# Patient Record
Sex: FEMALE | Race: WHITE | NOT HISPANIC OR LATINO | Employment: OTHER | URBAN - METROPOLITAN AREA
[De-identification: names, ages, dates, MRNs, and addresses within clinical notes are randomized per-mention and may not be internally consistent; named-entity substitution may affect disease eponyms.]

---

## 2017-03-20 ENCOUNTER — HOSPITAL ENCOUNTER (OUTPATIENT)
Facility: MEDICAL CENTER | Age: 67
End: 2017-03-20
Attending: SPECIALIST
Payer: MEDICARE

## 2017-03-20 LAB — CYTOLOGY REG CYTOL: NORMAL

## 2017-03-20 PROCEDURE — 88175 CYTOPATH C/V AUTO FLUID REDO: CPT

## 2017-05-08 ENCOUNTER — OFFICE VISIT (OUTPATIENT)
Dept: INTERNAL MEDICINE | Facility: MEDICAL CENTER | Age: 67
End: 2017-05-08
Payer: MEDICARE

## 2017-05-08 VITALS
WEIGHT: 177.8 LBS | BODY MASS INDEX: 31.5 KG/M2 | OXYGEN SATURATION: 94 % | DIASTOLIC BLOOD PRESSURE: 64 MMHG | SYSTOLIC BLOOD PRESSURE: 98 MMHG | TEMPERATURE: 98.1 F | HEART RATE: 88 BPM | HEIGHT: 63 IN

## 2017-05-08 DIAGNOSIS — E78.5 DYSLIPIDEMIA: ICD-10-CM

## 2017-05-08 DIAGNOSIS — R73.01 IMPAIRED FASTING GLUCOSE: ICD-10-CM

## 2017-05-08 DIAGNOSIS — E55.9 VITAMIN D DEFICIENCY: ICD-10-CM

## 2017-05-08 DIAGNOSIS — G45.8 OTHER SPECIFIED TRANSIENT CEREBRAL ISCHEMIAS: ICD-10-CM

## 2017-05-08 DIAGNOSIS — C54.9 MALIGNANT NEOPLASM OF CORPUS UTERI, EXCEPT ISTHMUS (HCC): ICD-10-CM

## 2017-05-08 DIAGNOSIS — Z79.01 CHRONIC ANTICOAGULATION: ICD-10-CM

## 2017-05-08 DIAGNOSIS — R91.1 PULMONARY NODULE: ICD-10-CM

## 2017-05-08 DIAGNOSIS — H35.30 MACULAR DEGENERATION: ICD-10-CM

## 2017-05-08 DIAGNOSIS — I10 ESSENTIAL HYPERTENSION: ICD-10-CM

## 2017-05-08 DIAGNOSIS — E66.9 OBESITY (BMI 30-39.9): ICD-10-CM

## 2017-05-08 DIAGNOSIS — Z80.9 FAMILY HISTORY OF CANCER: ICD-10-CM

## 2017-05-08 DIAGNOSIS — Z00.00 HEALTH CARE MAINTENANCE: ICD-10-CM

## 2017-05-08 DIAGNOSIS — I48.91 ATRIAL FIBRILLATION, UNSPECIFIED TYPE (HCC): ICD-10-CM

## 2017-05-08 PROCEDURE — 99214 OFFICE O/P EST MOD 30 MIN: CPT | Performed by: INTERNAL MEDICINE

## 2017-05-08 ASSESSMENT — PATIENT HEALTH QUESTIONNAIRE - PHQ9: CLINICAL INTERPRETATION OF PHQ2 SCORE: 0

## 2017-05-08 NOTE — ASSESSMENT & PLAN NOTE
No pre-syncope  The patient denies any symptoms referable to her elevated blood pressure. Specifically denies chest pain, palpitations, dyspnea, orthopnea, PND or peripheral edema. Current medication regimen is as listed below. Patient denies any side effects of medication.

## 2017-05-08 NOTE — MR AVS SNAPSHOT
"        Cristela Reyna Loges   2017 2:40 PM   Office Visit   MRN: [id number]    Department:  [facility]   Dept Phone:  [phone]    Description:  Female : 1950   Provider:  Nancy Ayoub M.D.           Reason for Visit     Annual Exam           Allergies as of 2017     Allergen Noted Reactions    Fentanyl 2012   Anaphylaxis    Versed 2012   Shortness of Breath, Vomiting    Bee 2012       And  spider--- tongue swelling    Codeine 2008       Increased heart rate    Eggs 2012   Vomiting    Flu Virus Vaccine 2016       Lidocaine Hcl 2008       \"severe drop in BP\"    Other Drug 2014       Novocaine hypotension ALL \"MELISA\"    Penicillins 2008       Increased heart rate    Soap 2008   Rash    Laundry Tide soap, Dove and Ivory soap    Tape 2008   Rash    Paper tape OK    Xylocaine [Lidocaine Hcl, Local Anesth.] 2010       All \"MELISA\" hypotension      You were diagnosed with     Obesity (BMI 30-39.9)   [789700]       Macular degeneration   [463662]       Malignant neoplasm of corpus uteri, except isthmus (CMS-HCC)   [182.0.ICD-9-CM]       Atrial fibrillation, unspecified type (CMS-HCC)   [1410147]       Essential hypertension   [9786212]       Dyslipidemia   [295821]       Chronic anticoagulation   [519080]       Impaired fasting glucose   [790.21.ICD-9-CM]       Family history of cancer   [507098]       Pulmonary nodule   [554820]       Other specified transient cerebral ischemias   [435.8.ICD-9-CM]       Health care maintenance   [937379]       Vitamin D deficiency   [4102275]         Vital Signs     Blood Pressure Pulse Temperature Height Weight Body Mass Index    98/64 mmHg 88 36.7 °C (98.1 °F) 1.607 m (5' 3.25\") 80.65 kg (177 lb 12.8 oz) 31.23 kg/m2    Oxygen Saturation Smoking Status                94% Never Smoker           Basic Information     Date Of Birth Sex Race Ethnicity Preferred Language    1950 Female " White Non- English      Problem List              ICD-10-CM Priority Class Noted - Resolved    Atrial fibrillation (CMS-HCC) I48.91 High  12/6/2012 - Present    Chronic anticoagulation Z79.01 High  12/6/2012 - Present    Essential hypertension I10 Medium  12/6/2012 - Present    Dyslipidemia E78.5 Medium  12/6/2012 - Present    TIA (transient ischemic attack) G45.9 Low  12/6/2012 - Present    Malignant neoplasm of corpus uteri, except isthmus (CMS-HCC) C54.9   8/28/2014 - Present    Macular degeneration H35.30   5/10/2016 - Present    Impaired fasting glucose R73.01   5/10/2016 - Present    Vitamin D deficiency E55.9   5/10/2016 - Present    Obesity (BMI 30-39.9) E66.9   5/10/2016 - Present    Hernia of abdominal wall K43.9   5/10/2016 - Present    Family history of cancer Z80.9   5/10/2016 - Present    Health care maintenance Z00.00   5/10/2016 - Present    Pulmonary nodule R91.1   11/9/2016 - Present      Health Maintenance        Date Due Completion Dates    IMM DTaP/Tdap/Td Vaccine (1 - Tdap) 9/24/1967 ---    IMM ZOSTER VACCINE 9/24/2008 ---    MAMMOGRAM 5/26/2017 5/26/2016, 11/11/2014, 12/17/2012, 12/2/2011, 11/29/2010, 11/13/2009, 11/13/2009, 11/10/2008, 11/10/2008, 11/9/2007, 11/9/2007, 11/2/2006, 10/17/2005    PAP SMEAR 3/20/2020 3/20/2017, 12/19/2013, 12/18/2012, 12/2/2011, 11/29/2010    BONE DENSITY 5/26/2021 5/26/2016, 12/4/2009    COLONOSCOPY 3/4/2025 3/4/2015, 3/4/2015 (Done)    Override on 3/4/2015: Done            Current Immunizations     13-VALENT PCV PREVNAR 11/9/2016    Pneumococcal polysaccharide vaccine (PPSV-23) 5/19/2015      Below and/or attached are the medications your provider expects you to take. Review all of your home medications and newly ordered medications with your provider and/or pharmacist. Follow medication instructions as directed by your provider and/or pharmacist. Please keep your medication list with you and share with your provider. Update the information when  medications are discontinued, doses are changed, or new medications (including over-the-counter products) are added; and carry medication information at all times in the event of emergency situations     Allergies:  FENTANYL - Anaphylaxis     VERSED - Shortness of Breath,Vomiting     BEE - (reactions not documented)     CODEINE - (reactions not documented)     EGGS - Vomiting     FLU VIRUS VACCINE - (reactions not documented)     LIDOCAINE HCL - (reactions not documented)     OTHER DRUG - (reactions not documented)     PENICILLINS - (reactions not documented)     SOAP - Rash     TAPE - Rash     XYLOCAINE - (reactions not documented)               Medications  Valid as of: May 08, 2017 -  4:01 PM    Generic Name Brand Name Tablet Size Instructions for use    Calcium (Tab) Calcium 600 MG Take 1 Tab by mouth every morning.          Cholecalciferol   Take 1,400 Units by mouth every day.        Dabigatran Etexilate Mesylate (Cap) PRADAXA 150 MG TAKE ONE CAPSULE BY MOUTH TWICE DAILY        Lisinopril (Tab) PRINIVIL 5 MG Take 1 Tab by mouth every day.        Lovastatin (Tab) MEVACOR 40 MG TAKE ONE TABLET BY MOUTH ONE TIME DAILY        Metoprolol Tartrate (Tab) LOPRESSOR 25 MG TAKE ONE TABLET BY MOUTH TWICE DAILY        Multiple Vitamin (Tab) THERAGRAN  Take 1 Tab by mouth every morning.          Multiple Vitamins-Minerals   Take  by mouth.        Polyethyl Glycol-Propyl Glycol   Place 1 Drop in both eyes every bedtime. Indications: **OTC**        Polyethylene Glycol 400   by Ophthalmic route every day.        .                 Medicines prescribed today were sent to:     [id number] - [place], [place] - [address] [place] [address] [place] [address]    Phone: [phone] Fax: [phone]    Open 24 Hours?: No      Medication refill instructions:       If your prescription bottle indicates you have medication refills left, it is not necessary to call your provider’s office. Please contact your pharmacy and they  will refill your medication.    If your prescription bottle indicates you do not have any refills left, you may request refills at any time through one of the following ways: The online [employer] system (except Urgent Care), by calling your provider’s office, or by asking your pharmacy to contact your provider’s office with a refill request. Medication refills are processed only during regular business hours and may not be available until the next business day. Your provider may request additional information or to have a follow-up visit with you prior to refilling your medication.   *Please Note: Medication refills are assigned a new Rx number when refilled electronically. Your pharmacy may indicate that no refills were authorized even though a new prescription for the same medication is available at the pharmacy. Please request the medicine by name with the pharmacy before contacting your provider for a refill.        Your To Do List     Future Labs/Procedures Complete By Expires    CBC WITH DIFFERENTIAL  As directed 5/9/2018    COMP METABOLIC PANEL  As directed 5/9/2018    CT-CHEST (THORAX) WITH  As directed 5/8/2018    HEMOGLOBIN A1C  As directed 5/9/2018    HEP C VIRUS ANTIBODY  As directed 5/9/2018    LIPID PROFILE  As directed 5/9/2018    MICROALBUMIN CREAT RATIO URINE  As directed 5/9/2018      Instructions    Get labs and CT in June    Get tetanus with whooping  cough at pharmacy          [employer] Access Code: Activation code not generated  Current [employer]Plan Status: Active

## 2017-05-08 NOTE — ASSESSMENT & PLAN NOTE
Patient denies polyuria, polydipsia. Is taking meds as prescribed. Is being attentive to diet. And making efforts to exercise regularly. Denies any new numbness symptoms in the feet, and does routine foot inspections. Is up-to-date routine vision screening. Patient denies chest pain, palpitations, shortness of breath, excessive fatigue.

## 2017-05-09 NOTE — PROGRESS NOTES
Established Patient    Cristela Alan is a 66 y.o. female who presents today with the following:    CC: Here for routine visit. No new complaints.    HPI:    Obesity (BMI 30-39.9)  Weight down-     Macular degeneration  Just had visit- vision better, mac degen- stabilizing    Malignant neoplasm of corpus uteri, except isthmus (CMS-HCC)  Doing well- has mammo order    Atrial fibrillation  stable- reg cards f/u.    Essential hypertension  No pre-syncope  The patient denies any symptoms referable to her elevated blood pressure. Specifically denies chest pain, palpitations, dyspnea, orthopnea, PND or peripheral edema. Current medication regimen is as listed below. Patient denies any side effects of medication.        Dyslipidemia  Patient denies any signs/symptoms of cardiovascular disease. Denies chest pain/pressure; denies numbness/weakness or difficulty with speech/swallowing or sudden change in vision.       Chronic anticoagulation  No bleeding/bruising    Impaired fasting glucose  Patient denies polyuria, polydipsia. Is taking meds as prescribed. Is being attentive to diet. And making efforts to exercise regularly. Denies any new numbness symptoms in the feet, and does routine foot inspections. Is up-to-date routine vision screening. Patient denies chest pain, palpitations, shortness of breath, excessive fatigue.      Family history of cancer  No BM changes or blood.     Pulmonary nodule  No URI symptoms, no cough, no hemoptysis    TIA (transient ischemic attack)  Patient denies any new neurological symptoms no numbness, weakness, vision, swallow, or speech problems. And is taking meds as prescribed.      Health care maintenance  Sees eye doc/dentist  Active, eating pretty well- not too much attn to diet.     Vitamin D deficiency  On replcmt.         ROS: As per HPI. Additional pertinent symptoms as noted below.    ROS:  Constitutional ROS: No unexpected change in weight, No weakness, No fatigue  Eye ROS:  No recent significant change in vision  Pulmonary ROS: No shortness of breath, No recent change in breathing  Cardiovascular ROS: No chest pain, No edema, No palpitations, No syncope  Gastrointestinal ROS: No abdominal pain, No nausea, vomiting, diarrhea, or constipation  Psychiatric ROS: No depression, No anxiety    Patient Active Problem List    Diagnosis Date Noted   • Atrial fibrillation (CMS-HCC) 12/06/2012     Priority: High   • Chronic anticoagulation 12/06/2012     Priority: High   • Essential hypertension 12/06/2012     Priority: Medium   • Dyslipidemia 12/06/2012     Priority: Medium   • TIA (transient ischemic attack) 12/06/2012     Priority: Low   • Pulmonary nodule 11/09/2016   • Macular degeneration 05/10/2016   • Impaired fasting glucose 05/10/2016   • Vitamin D deficiency 05/10/2016   • Obesity (BMI 30-39.9) 05/10/2016   • Hernia of abdominal wall 05/10/2016   • Family history of cancer 05/10/2016   • Health care maintenance 05/10/2016   • Malignant neoplasm of corpus uteri, except isthmus (CMS-HCC) 08/28/2014       Social History   Substance Use Topics   • Smoking status: Never Smoker    • Smokeless tobacco: Never Used   • Alcohol Use: Yes      Comment:  1 every 3-4 months       Current Outpatient Prescriptions   Medication Sig Dispense Refill   • Multiple Vitamins-Minerals (PRESERVISION AREDS PO) Take  by mouth.     • Cholecalciferol (VITAMIN D HIGH POTENCY PO) Take 1,400 Units by mouth every day.     • PRADAXA 150 MG Cap capsule TAKE ONE CAPSULE BY MOUTH TWICE DAILY 180 Cap 3   • metoprolol (LOPRESSOR) 25 MG Tab TAKE ONE TABLET BY MOUTH TWICE DAILY 180 Tab 3   • lovastatin (MEVACOR) 40 MG tablet TAKE ONE TABLET BY MOUTH ONE TIME DAILY 90 Tab 1   • lisinopril (PRINIVIL) 5 MG Tab Take 1 Tab by mouth every day. 90 Tab 3   • Polyethylene Glycol 400 (BLINK TEARS OP) by Ophthalmic route every day.     • Calcium 600 MG TABS Take 1 Tab by mouth every morning.       • multivitamin (THERAGRAN) TABS Take 1  "Tab by mouth every morning.       • Polyethyl Glycol-Propyl Glycol (SYSTANE OP) Place 1 Drop in both eyes every bedtime. Indications: **OTC**       No current facility-administered medications for this visit.       BP 98/64 mmHg  Pulse 88  Temp(Src) 36.7 °C (98.1 °F)  Ht 1.607 m (5' 3.25\")  Wt 80.65 kg (177 lb 12.8 oz)  BMI 31.23 kg/m2  SpO2 94%    Physical Exam  General: Well developed, well nourished female, in no distress.  Eyes: Conjuntiva without any obvious injection or erythema.   Ears- canals and TMs clear  Mouth- mucous membranes moist, no erythema  Cardiovascular: Heart is regular with no murmurs, no bruits  Lungs: Clear to auscultation bilaterally. No wheezes, rhonci or crackles heard. Respiratory effort is normal.  Abd: Soft, non-tender  Ext: No edema  Other: Patient in and out of chair, on and off exam table without problem or assistance.          Assessment and Plan      1. Obesity (BMI 30-39.9)  Patient working on getting her weight down. She is not particularly motivated to make dietary changes but she will resume walking. Discussed decreased carbs and increase fruits and veggies  - Patient identified as having weight management issue.  Appropriate orders and counseling given.    2. Macular degeneration  Since things have stabilized. Regular follow-up with ophthalmology    3. Malignant neoplasm of corpus uteri, except isthmus (CMS-HCC)  Visits with Dr. Wylie every 4 months now.  - CBC WITH DIFFERENTIAL; Future    4. Atrial fibrillation, unspecified type (CMS-HCC)  Sinus rhythm today. Regular follow-up with cardiology on a yearly basis    5. Essential hypertension  Near/at goal No change in treatment. Discussed diet, exercise and salt restriction.    Encourage patient to drink lots of fluids. Her blood pressure is low here today. She denies any symptoms associated with that. She can discuss with her cardiologist at the next routine visit whether it may be worth backing off on the metoprolol if her " blood pressures remains a low    - COMP METABOLIC PANEL; Future    6. Dyslipidemia  Lipids are in reasonable range. The patient to continue paying attention to diet and exercise. Patient aware to go to emergency department or call 911 if any signs of cardiovascular disease- chest pain or pressure, sudden numbness or weakness in an arm or leg, sudden loss of ability to talk or swallow.      - COMP METABOLIC PANEL; Future  - LIPID PROFILE; Future    7. Chronic anticoagulation      8. Impaired fasting glucose  The patient's last hemoglobin A1c:  Lab Results   Component Value Date/Time    GLYCOHEMOGLOBIN 6.2* 09/14/2016 08:37 AM    GLYCOHEMOGLOBIN 6.1* 03/14/2016 08:18 AM    GLYCOHEMOGLOBIN 6.2* 06/01/2015 08:14 AM    GLYCOHEMOGLOBIN 6.1* 12/08/2014 08:03 AM       We discussed the need for carbohydrate restriction, regular exercise and weight control.       - HEMOGLOBIN A1C; Future  - MICROALBUMIN CREAT RATIO URINE; Future    9. Family history of cancer        10. Pulmonary nodule  Repeat CT in June. She does get a lot of secondhand smoke from her  who now smokes outside  - CT-CHEST (THORAX) WITH; Future    11. Other specified transient cerebral ischemias  Patient has no signs of recent neurovascular event. Risk factors are controlled.        12. Health care maintenance  Screening up-to-date with the exception of hep C. Also recommend tdap At the pharmacy  - HEP C VIRUS ANTIBODY; Future    13. Vitamin D deficiency  On replacement      Return in about 6 months (around 11/8/2017).      Signed by: Nancy Ayoub M.D.    This note was created using voice recognition software. There may be unintended errors in spelling, grammar or content.

## 2017-06-12 ENCOUNTER — HOSPITAL ENCOUNTER (OUTPATIENT)
Dept: LAB | Facility: MEDICAL CENTER | Age: 67
End: 2017-06-12
Attending: INTERNAL MEDICINE
Payer: MEDICARE

## 2017-06-12 DIAGNOSIS — Z00.00 HEALTH CARE MAINTENANCE: ICD-10-CM

## 2017-06-12 DIAGNOSIS — I10 ESSENTIAL HYPERTENSION: ICD-10-CM

## 2017-06-12 DIAGNOSIS — E78.5 DYSLIPIDEMIA: ICD-10-CM

## 2017-06-12 DIAGNOSIS — R73.01 IMPAIRED FASTING GLUCOSE: ICD-10-CM

## 2017-06-12 DIAGNOSIS — C54.9 MALIGNANT NEOPLASM OF CORPUS UTERI, EXCEPT ISTHMUS (HCC): ICD-10-CM

## 2017-06-12 LAB
ALBUMIN SERPL BCP-MCNC: 3.5 G/DL (ref 3.2–4.9)
ALBUMIN/GLOB SERPL: 1.2 G/DL
ALP SERPL-CCNC: 74 U/L (ref 30–99)
ALT SERPL-CCNC: 12 U/L (ref 2–50)
ANION GAP SERPL CALC-SCNC: 4 MMOL/L (ref 0–11.9)
AST SERPL-CCNC: 13 U/L (ref 12–45)
BASOPHILS # BLD AUTO: 1.2 % (ref 0–1.8)
BASOPHILS # BLD: 0.05 K/UL (ref 0–0.12)
BILIRUB SERPL-MCNC: 0.6 MG/DL (ref 0.1–1.5)
BUN SERPL-MCNC: 16 MG/DL (ref 8–22)
CALCIUM SERPL-MCNC: 9.1 MG/DL (ref 8.5–10.5)
CHLORIDE SERPL-SCNC: 105 MMOL/L (ref 96–112)
CHOLEST SERPL-MCNC: 169 MG/DL (ref 100–199)
CO2 SERPL-SCNC: 29 MMOL/L (ref 20–33)
CREAT SERPL-MCNC: 0.87 MG/DL (ref 0.5–1.4)
CREAT UR-MCNC: 67.1 MG/DL
EOSINOPHIL # BLD AUTO: 0.08 K/UL (ref 0–0.51)
EOSINOPHIL NFR BLD: 1.9 % (ref 0–6.9)
ERYTHROCYTE [DISTWIDTH] IN BLOOD BY AUTOMATED COUNT: 44.8 FL (ref 35.9–50)
EST. AVERAGE GLUCOSE BLD GHB EST-MCNC: 131 MG/DL
GFR SERPL CREATININE-BSD FRML MDRD: >60 ML/MIN/1.73 M 2
GLOBULIN SER CALC-MCNC: 2.9 G/DL (ref 1.9–3.5)
GLUCOSE SERPL-MCNC: 125 MG/DL (ref 65–99)
HBA1C MFR BLD: 6.2 % (ref 0–5.6)
HCT VFR BLD AUTO: 43 % (ref 37–47)
HCV AB SER QL: NEGATIVE
HDLC SERPL-MCNC: 54 MG/DL
HGB BLD-MCNC: 13.9 G/DL (ref 12–16)
IMM GRANULOCYTES # BLD AUTO: 0.01 K/UL (ref 0–0.11)
IMM GRANULOCYTES NFR BLD AUTO: 0.2 % (ref 0–0.9)
LDLC SERPL CALC-MCNC: 88 MG/DL
LYMPHOCYTES # BLD AUTO: 1.13 K/UL (ref 1–4.8)
LYMPHOCYTES NFR BLD: 26.6 % (ref 22–41)
MCH RBC QN AUTO: 29.7 PG (ref 27–33)
MCHC RBC AUTO-ENTMCNC: 32.3 G/DL (ref 33.6–35)
MCV RBC AUTO: 91.9 FL (ref 81.4–97.8)
MICROALBUMIN UR-MCNC: <0.7 MG/DL
MICROALBUMIN/CREAT UR: NORMAL MG/G (ref 0–30)
MONOCYTES # BLD AUTO: 0.39 K/UL (ref 0–0.85)
MONOCYTES NFR BLD AUTO: 9.2 % (ref 0–13.4)
NEUTROPHILS # BLD AUTO: 2.59 K/UL (ref 2–7.15)
NEUTROPHILS NFR BLD: 60.9 % (ref 44–72)
NRBC # BLD AUTO: 0 K/UL
NRBC BLD AUTO-RTO: 0 /100 WBC
PLATELET # BLD AUTO: 294 K/UL (ref 164–446)
PMV BLD AUTO: 10.1 FL (ref 9–12.9)
POTASSIUM SERPL-SCNC: 4.3 MMOL/L (ref 3.6–5.5)
PROT SERPL-MCNC: 6.4 G/DL (ref 6–8.2)
RBC # BLD AUTO: 4.68 M/UL (ref 4.2–5.4)
SODIUM SERPL-SCNC: 138 MMOL/L (ref 135–145)
TRIGL SERPL-MCNC: 137 MG/DL (ref 0–149)
WBC # BLD AUTO: 4.3 K/UL (ref 4.8–10.8)

## 2017-06-12 PROCEDURE — 80053 COMPREHEN METABOLIC PANEL: CPT

## 2017-06-12 PROCEDURE — 80061 LIPID PANEL: CPT

## 2017-06-12 PROCEDURE — 36415 COLL VENOUS BLD VENIPUNCTURE: CPT

## 2017-06-12 PROCEDURE — 83036 HEMOGLOBIN GLYCOSYLATED A1C: CPT

## 2017-06-12 PROCEDURE — 82043 UR ALBUMIN QUANTITATIVE: CPT

## 2017-06-12 PROCEDURE — 82570 ASSAY OF URINE CREATININE: CPT

## 2017-06-12 PROCEDURE — 86803 HEPATITIS C AB TEST: CPT

## 2017-06-12 PROCEDURE — 85025 COMPLETE CBC W/AUTO DIFF WBC: CPT

## 2017-06-14 ENCOUNTER — HOSPITAL ENCOUNTER (OUTPATIENT)
Dept: RADIOLOGY | Facility: MEDICAL CENTER | Age: 67
End: 2017-06-14
Attending: INTERNAL MEDICINE
Payer: MEDICARE

## 2017-06-14 DIAGNOSIS — Z12.31 ENCOUNTER FOR MAMMOGRAM TO ESTABLISH BASELINE MAMMOGRAM: ICD-10-CM

## 2017-06-14 PROCEDURE — 77063 BREAST TOMOSYNTHESIS BI: CPT

## 2017-06-16 ENCOUNTER — HOSPITAL ENCOUNTER (OUTPATIENT)
Dept: RADIOLOGY | Facility: MEDICAL CENTER | Age: 67
End: 2017-06-16
Attending: INTERNAL MEDICINE
Payer: MEDICARE

## 2017-06-16 DIAGNOSIS — R91.1 PULMONARY NODULE: ICD-10-CM

## 2017-06-16 PROCEDURE — 71260 CT THORAX DX C+: CPT

## 2017-06-16 PROCEDURE — 700117 HCHG RX CONTRAST REV CODE 255: Performed by: INTERNAL MEDICINE

## 2017-06-16 RX ADMIN — IOHEXOL 75 ML: 350 INJECTION, SOLUTION INTRAVENOUS at 10:56

## 2017-06-22 RX ORDER — LOVASTATIN 40 MG/1
TABLET ORAL
Qty: 90 TAB | Refills: 0 | Status: SHIPPED | OUTPATIENT
Start: 2017-06-22 | End: 2017-09-28 | Stop reason: SDUPTHER

## 2017-06-22 NOTE — TELEPHONE ENCOUNTER
Last seen: 05/08/17 by Dr. Ayoub  Next appt: None     Was the patient seen in the last year in this department? Yes   Does patient have an active prescription for medications requested? No   Received Request Via: Pharmacy

## 2017-07-21 ENCOUNTER — OFFICE VISIT (OUTPATIENT)
Dept: INTERNAL MEDICINE | Facility: MEDICAL CENTER | Age: 67
End: 2017-07-21
Payer: MEDICARE

## 2017-07-21 VITALS
WEIGHT: 177.4 LBS | BODY MASS INDEX: 31.43 KG/M2 | SYSTOLIC BLOOD PRESSURE: 118 MMHG | HEART RATE: 67 BPM | TEMPERATURE: 97.5 F | HEIGHT: 63 IN | DIASTOLIC BLOOD PRESSURE: 80 MMHG | OXYGEN SATURATION: 98 %

## 2017-07-21 DIAGNOSIS — R91.1 PULMONARY NODULE: ICD-10-CM

## 2017-07-21 PROCEDURE — 99214 OFFICE O/P EST MOD 30 MIN: CPT | Mod: GC | Performed by: INTERNAL MEDICINE

## 2017-07-21 NOTE — PROGRESS NOTES
Established Patient    Cristela presents today with the following:    CC: To discuss CT scan results     HPI: Ms Alan is a 67 yo female, (PCP Dr Ayoub) PMH of HTN, DLD,  atrial fibrillation on Pradaxa, history of endometrial cancer, multiple lung nodules, presents today to discuss the results of her most recent CT scan on 2016.   There was incidental finding of lung nodule on chest x-ray ordered for follow-up of endometrial cancer around 2016. A  CT chest done on 2016 showed 3 nodules. Follow-up CT was done in 2016 and 2017. 2 new nodules found on CT.   She denies cough, shortness of breath, hemoptysis, recent change in weight, fatigue.   She has no other complaints. All her chronic medical conditions are stable, compliant with medication.   She has a family history of cancer, her sister had endometrial cancer, mother also had endometrial cancer, her grandmother  of pancreatic cancer.   She never smoked however she's exposed to secondhand smoking as her  smokes a lot.     Patient Active Problem List    Diagnosis Date Noted   • Atrial fibrillation (CMS-HCC) 2012     Priority: High   • Chronic anticoagulation 2012     Priority: High   • Essential hypertension 2012     Priority: Medium   • Dyslipidemia 2012     Priority: Medium   • TIA (transient ischemic attack) 2012     Priority: Low   • Pulmonary nodule 2016   • Macular degeneration 05/10/2016   • Impaired fasting glucose 05/10/2016   • Vitamin D deficiency 05/10/2016   • Obesity (BMI 30-39.9) 05/10/2016   • Hernia of abdominal wall 05/10/2016   • Family history of cancer 05/10/2016   • Health care maintenance 05/10/2016   • Malignant neoplasm of corpus uteri, except isthmus (CMS-HCC) 2014       Current Outpatient Prescriptions   Medication Sig Dispense Refill   • lovastatin (MEVACOR) 40 MG tablet TAKE ONE TABLET BY MOUTH ONE TIME DAILY 90 Tab 0   • Multiple  "Vitamins-Minerals (PRESERVISION AREDS PO) Take  by mouth.     • Cholecalciferol (VITAMIN D HIGH POTENCY PO) Take 1,400 Units by mouth every day.     • PRADAXA 150 MG Cap capsule TAKE ONE CAPSULE BY MOUTH TWICE DAILY 180 Cap 3   • metoprolol (LOPRESSOR) 25 MG Tab TAKE ONE TABLET BY MOUTH TWICE DAILY 180 Tab 3   • lisinopril (PRINIVIL) 5 MG Tab Take 1 Tab by mouth every day. 90 Tab 3   • Polyethylene Glycol 400 (BLINK TEARS OP) by Ophthalmic route every day.     • Calcium 600 MG TABS Take 1 Tab by mouth every morning.       • multivitamin (THERAGRAN) TABS Take 1 Tab by mouth every morning.       • Polyethyl Glycol-Propyl Glycol (SYSTANE OP) Place 1 Drop in both eyes every bedtime. Indications: **OTC**       No current facility-administered medications for this visit.       ROS: As per HPI. Additional pertinent symptoms as noted below.        /80 mmHg  Pulse 67  Temp(Src) 36.4 °C (97.5 °F)  Ht 1.607 m (5' 3.25\")  Wt 80.468 kg (177 lb 6.4 oz)  BMI 31.16 kg/m2  SpO2 98%    Physical Exam   Constitutional:  oriented to person, place, and time. No distress.   Eyes: Pupils are equal, round, and reactive to light. No scleral icterus.  Neck: Neck supple. No thyromegaly present.   Cardiovascular: Normal rate, regular rhythm and normal heart sounds.  Exam reveals no gallop and no friction rub.  No murmur heard.  Pulmonary/Chest: Breath sounds normal. Chest wall is not dull to percussion.   Musculoskeletal:   no edema.   Lymphadenopathy: no cervical adenopathy  Neurological: alert and oriented to person, place, and time.   Skin: No cyanosis. Nails show no clubbing.        Assessment and Plan    1. Pulmonary nodule  Incidental finding of lung nodules in a non smoking patient.   Prior lung nodules                                  09/2016 12/2016 06/2017  Right upper lobe     9mm                  9mm                   9.3  Lingular lobe           5mm                  5.6 mm               5.7 " mm  Left lower lobe                                   4.7 mm              3.5 mm    New findings on 06/2017   - since previous examination, ill-defined somewhat nodular infiltration within the posterior right upper lobe has developed. Within this infiltration, a 6.4 x 11.6 mm nodular infiltrate image 23 and 9.6 x 10.2 mm nodular infiltrate image 27 are demonstrated.    Will order PET CT to further evaluation, patient verbalizes understanding.   Further follow up based on PET CT findings.     Followup: with Dr Ayoub or sooner based on PET CT results      Signed by: Lisa Read M.D.

## 2017-07-21 NOTE — MR AVS SNAPSHOT
"        Cristela Reyna Loges   2017 8:00 AM   Office Visit   MRN: [id number]    Department:  [facility] Med - Internal Med   Dept Phone:  [phone]    Description:  Female : 1950   Provider:  Lisa Read M.D.           Reason for Visit     Results CT-Dr. Ayoub pt       Allergies as of 2017     Allergen Noted Reactions    Fentanyl 2012   Anaphylaxis    Versed 2012   Shortness of Breath, Vomiting    Bee 2012       And  spider--- tongue swelling    Codeine 2008       Increased heart rate    Eggs 2012   Vomiting    Flu Virus Vaccine 2016       Lidocaine Hcl 2008       \"severe drop in BP\"    Other Drug 2014       Novocaine hypotension ALL \"MELISA\"    Penicillins 2008       Increased heart rate    Soap 2008   Rash    Laundry Tide soap, Dove and Ivory soap    Tape 2008   Rash    Paper tape OK    Xylocaine [Lidocaine Hcl, Local Anesth.] 2010       All \"MELISA\" hypotension      You were diagnosed with     Pulmonary nodule   [[id number]]         Vital Signs     Blood Pressure Pulse Temperature Height Weight Body Mass Index    118/80 mmHg 67 36.4 °C (97.5 °F) 1.607 m (5' 3.25\") 80.468 kg (177 lb 6.4 oz) 31.16 kg/m2    Oxygen Saturation Smoking Status                98% Never Smoker           Basic Information     Date Of Birth Sex Race Ethnicity Preferred Language    1950 Female White Non- English      Your appointments     Dec 05, 2017  3:20 PM   Established Patient with RANDI Weathers / [facility] Med - Internal Medicine (--)    [address] [place] [address]  [address]  [facility] [id number] [phone]           You will be receiving a confirmation call a few days before your appointment from our automated call confirmation system.            2018  8:00 AM   Established Patient with RANDI Weathers / [facility] Med - Internal Medicine (--)    [address]  [address]  [facility] " [id number]   [phone]           You will be receiving a confirmation call a few days before your appointment from our automated call confirmation system.              Problem List              ICD-10-CM Priority Class Noted - Resolved    Atrial fibrillation (CMS-HCC) I48.91 High  12/6/2012 - Present    Chronic anticoagulation Z79.01 High  12/6/2012 - Present    Essential hypertension I10 Medium  12/6/2012 - Present    Dyslipidemia E78.5 Medium  12/6/2012 - Present    TIA (transient ischemic attack) G45.9 Low  12/6/2012 - Present    Malignant neoplasm of corpus uteri, except isthmus (CMS-[facility]) C54.9   8/28/2014 - Present    Macular degeneration H35.30   5/10/2016 - Present    Impaired fasting glucose R73.01   5/10/2016 - Present    Vitamin D deficiency E55.9   5/10/2016 - Present    Obesity (BMI 30-39.9) E66.9   5/10/2016 - Present    Hernia of abdominal wall K43.9   5/10/2016 - Present    Family history of cancer Z80.9   5/10/2016 - Present    Health care maintenance Z00.00   5/10/2016 - Present    Pulmonary nodule R91.1   11/9/2016 - Present      Health Maintenance        Date Due Completion Dates    IMM DTaP/Tdap/Td Vaccine (1 - Tdap) 9/24/1967 ---    IMM ZOSTER VACCINE 9/24/2008 ---    IMM INFLUENZA (1) 9/1/2017 ---    MAMMOGRAM 6/14/2018 6/14/2017, 5/26/2016, 11/11/2014, 12/17/2012, 12/2/2011, 11/29/2010, 11/13/2009, 11/13/2009, 11/10/2008, 11/10/2008, 11/9/2007, 11/9/2007, 11/2/2006, 10/17/2005    PAP SMEAR 3/20/2020 3/20/2017, 12/19/2013, 12/18/2012, 12/2/2011, 11/29/2010    BONE DENSITY 5/26/2021 5/26/2016, 12/4/2009    COLONOSCOPY 3/4/2025 3/4/2015, 3/4/2015 (Done)    Override on 3/4/2015: Done            Current Immunizations     13-VALENT PCV PREVNAR 11/9/2016    Pneumococcal polysaccharide vaccine (PPSV-23) 5/19/2015      Below and/or attached are the medications your provider expects you to take. Review all of your home medications and newly ordered medications with your provider and/or pharmacist.  Follow medication instructions as directed by your provider and/or pharmacist. Please keep your medication list with you and share with your provider. Update the information when medications are discontinued, doses are changed, or new medications (including over-the-counter products) are added; and carry medication information at all times in the event of emergency situations     Allergies:  FENTANYL - Anaphylaxis     VERSED - Shortness of Breath,Vomiting     BEE - (reactions not documented)     CODEINE - (reactions not documented)     EGGS - Vomiting     FLU VIRUS VACCINE - (reactions not documented)     LIDOCAINE HCL - (reactions not documented)     OTHER DRUG - (reactions not documented)     PENICILLINS - (reactions not documented)     SOAP - Rash     TAPE - Rash     XYLOCAINE - (reactions not documented)               Medications  Valid as of: July 21, 2017 -  8:33 AM    Generic Name Brand Name Tablet Size Instructions for use    Calcium (Tab) Calcium 600 MG Take 1 Tab by mouth every morning.          Cholecalciferol   Take 1,400 Units by mouth every day.        Dabigatran Etexilate Mesylate (Cap) PRADAXA 150 MG TAKE ONE CAPSULE BY MOUTH TWICE DAILY        Lisinopril (Tab) PRINIVIL 5 MG Take 1 Tab by mouth every day.        Lovastatin (Tab) MEVACOR 40 MG TAKE ONE TABLET BY MOUTH ONE TIME DAILY        Metoprolol Tartrate (Tab) LOPRESSOR 25 MG TAKE ONE TABLET BY MOUTH TWICE DAILY        Multiple Vitamin (Tab) THERAGRAN  Take 1 Tab by mouth every morning.          Multiple Vitamins-Minerals   Take  by mouth.        Polyethyl Glycol-Propyl Glycol   Place 1 Drop in both eyes every bedtime. Indications: **OTC**        Polyethylene Glycol 400   by Ophthalmic route every day.        .                 Medicines prescribed today were sent to:     [id number] - [place], [place] - [address] [facility] [address] [place] [place] [address]    Phone: [phone] Fax: [phone]    Open 24 Hours?: No      Medication refill  instructions:       If your prescription bottle indicates you have medication refills left, it is not necessary to call your provider’s office. Please contact your pharmacy and they will refill your medication.    If your prescription bottle indicates you do not have any refills left, you may request refills at any time through one of the following ways: The online [employer] system (except Urgent Care), by calling your provider’s office, or by asking your pharmacy to contact your provider’s office with a refill request. Medication refills are processed only during regular business hours and may not be available until the next business day. Your provider may request additional information or to have a follow-up visit with you prior to refilling your medication.   *Please Note: Medication refills are assigned a new Rx number when refilled electronically. Your pharmacy may indicate that no refills were authorized even though a new prescription for the same medication is available at the pharmacy. Please request the medicine by name with the pharmacy before contacting your provider for a refill.        Your To Do List     Future Labs/Procedures Complete By Expires    CT-PETCT-WHOLE BODY  As directed 7/21/2018      Instructions    Please get the PET scan done, will follow up with results      PET Scan  A PET scan, also called positron emission tomography, is a test that creates pictures of the inside of the body. A PET scan requires a small dose of a harmless radioactive material to be injected into a vein. When this material combines with certain substances in the body, it produces tiny particles that can be detected by a scanner and converted into pictures.   The pictures created during a PET scan can be used to study a disease. They are often used to study cancer and cancer therapy. The colors and brightness on the pictures show different levels of organ and tissue function. For example, cancer tissue appears brighter  than normal tissue on a PET scan picture.  LET YOUR HEALTH CARE PROVIDER KNOW ABOUT:   · Any allergies you have.  · All medicines you are taking, including vitamins, herbs, eye drops, creams, and over-the-counter medicines.  · Previous problems you or members of your family have had with the use of anesthetics.  · Any blood disorders you have.  · Previous surgeries you have had.  · Medical conditions you have.  · If you are afraid of cramped spaces (claustrophobic). If claustrophobia is a problem, it usually can be relieved with mild sedatives or antianxiety medicines.  · If you have trouble staying still for long periods of time.  BEFORE THE PROCEDURE   · Do not eat or drink anything after midnight on the night before the procedure or as directed by your health care provider.  · Take medicines only as directed by your health care provider.  · If you have diabetes, ask your health care provider for diet guidelines to control sugar (glucose) levels on the day of the test.  PROCEDURE   · A small amount of radioactive material will be injected into a vein. The test will begin 30-60 minutes after the injection, when the material has traveled around your body.  · You will lie on a cushioned table, and the table will be moved through the center of a machine that looks like a large donut. It will take about 30-60 minutes for the machine to produce pictures of your body. You will need to stay still during this time.  AFTER THE PROCEDURE  · You may resume your normal diet and activities.  · Drink several 8 oz glasses of water following the test to flush the radioactive material out of your body.     This information is not intended to replace advice given to you by your health care provider. Make sure you discuss any questions you have with your health care provider.     Document Released: 06/23/2004 Document Revised: 01/08/2016 Document Reviewed: 04/01/2015  [employer] Interactive Patient Education ©2016 [employer] Inc.               [employer] Access Code: Activation code not generated  Current [employer] Status: Active

## 2017-07-21 NOTE — PATIENT INSTRUCTIONS
Please get the PET scan done, will follow up with results      PET Scan  A PET scan, also called positron emission tomography, is a test that creates pictures of the inside of the body. A PET scan requires a small dose of a harmless radioactive material to be injected into a vein. When this material combines with certain substances in the body, it produces tiny particles that can be detected by a scanner and converted into pictures.   The pictures created during a PET scan can be used to study a disease. They are often used to study cancer and cancer therapy. The colors and brightness on the pictures show different levels of organ and tissue function. For example, cancer tissue appears brighter than normal tissue on a PET scan picture.  LET YOUR HEALTH CARE PROVIDER KNOW ABOUT:   · Any allergies you have.  · All medicines you are taking, including vitamins, herbs, eye drops, creams, and over-the-counter medicines.  · Previous problems you or members of your family have had with the use of anesthetics.  · Any blood disorders you have.  · Previous surgeries you have had.  · Medical conditions you have.  · If you are afraid of cramped spaces (claustrophobic). If claustrophobia is a problem, it usually can be relieved with mild sedatives or antianxiety medicines.  · If you have trouble staying still for long periods of time.  BEFORE THE PROCEDURE   · Do not eat or drink anything after midnight on the night before the procedure or as directed by your health care provider.  · Take medicines only as directed by your health care provider.  · If you have diabetes, ask your health care provider for diet guidelines to control sugar (glucose) levels on the day of the test.  PROCEDURE   · A small amount of radioactive material will be injected into a vein. The test will begin 30-60 minutes after the injection, when the material has traveled around your body.  · You will lie on a cushioned table, and the table will be moved  through the center of a machine that looks like a large donut. It will take about 30-60 minutes for the machine to produce pictures of your body. You will need to stay still during this time.  AFTER THE PROCEDURE  · You may resume your normal diet and activities.  · Drink several 8 oz glasses of water following the test to flush the radioactive material out of your body.     This information is not intended to replace advice given to you by your health care provider. Make sure you discuss any questions you have with your health care provider.     Document Released: 06/23/2004 Document Revised: 01/08/2016 Document Reviewed: 04/01/2015  [employer] Interactive Patient Education ©2016 Elsevier Inc.

## 2017-08-02 ENCOUNTER — TELEPHONE (OUTPATIENT)
Dept: INTERNAL MEDICINE | Facility: MEDICAL CENTER | Age: 67
End: 2017-08-02

## 2017-08-02 NOTE — TELEPHONE ENCOUNTER
Regarding: RE: Non-Urgent Medical Question  Contact: [phone]  ----- Message from Visionary Funio, Med Ass't sent at 8/2/2017  8:26 AM PDT -----       ----- Message sent from Odessayaima Rayo, Med Ass't to Alvaro Reyna Chandler at 8/2/2017  8:26 AM -----   Good Morning Alvaro,  Where did you have the TDap at? So I can request the record for that. I will also forward this to Dr Ayoub to answer your other questions.  Thanks,  [place]     ----- Message -----     From: CHANDLER,ALVARO REYNA     Sent: 7/31/2017  2:36 PM PDT       To: Nancy Ayoub M.D.  Subject: Non-Urgent Medical Question    For my records: I received the TDaP shot Friday July 28. I have a question.  My CT scan mentioned pneumonia. Is it saying I have or have had it? Or is this from my recent prevanar 13 shot? I have the paperwork for the pet scam. No one has called yet to set the appt. or is this something to be done in the next 3 months or so...? Thank you, Alvaro Alan

## 2017-08-02 NOTE — TELEPHONE ENCOUNTER
CT scan is saying  the spot seen could be an infection but they are not clear. That is why the PET scan was recommended. (Patient had a visit with resident and attending to review this CT Results)     MA please call and find out with the scheduling glitches for the PET scan it has been over a week at this time. The order is in the chart.    The Prevnar 13 shot should not have caused any of these issues.

## 2017-08-03 ENCOUNTER — TELEPHONE (OUTPATIENT)
Dept: INTERNAL MEDICINE | Facility: MEDICAL CENTER | Age: 67
End: 2017-08-03

## 2017-08-03 NOTE — TELEPHONE ENCOUNTER
Regarding: RE: Non-Urgent Medical Question  Contact: [phone]  ----- Message from Odessayaima Rayo, Med Ass't sent at 8/3/2017  2:57 PM PDT -----  NITISH     ----- Message sent from Odessa Girma, Med Ass't to Cristela Arteaga at 8/3/2017  2:57 PM -----   Med Archibald,  I will call [facility] they haven't put it in Web [employer]Z yet. But I will enter in your chart.  As for the Splotches. Is it just one Splotch or multiple and where are they?  Thanks,  Od[place]     ----- Message -----     From: CRISTELA ARTEAGA     Sent: 8/2/2017  3:50 PM PDT       To: Odessa Rayo, Med Ass't  Subject: RE: Non-Urgent Medical Question    I had the shot at the [facility] pharmacy on [facility] . I had 3 days of muscle pain and not I have red splotches which are warm. Been putting baking soda on to keep the itch down. Thank you. Cristela  ----- Message -----  From: Odessakiet Rayo, Med Ass't  Sent: 8/2/2017  8:26 AM PDT  To: Cristela Arteaga  Subject: RE: Non-Urgent Medical Question    Good Morning Cristela,  Where did you have the TDap at? So I can request the record for that. I will also forward this to Dr Ayoub to answer your other questions.  Thanks,  [place]     ----- Message -----     From: CRISTELA ARTEAGA     Sent: 7/31/2017  2:36 PM PDT       To: Nancy Ayoub M.D.  Subject: Non-Urgent Medical Question    For my records: I received the TDaP shot Friday July 28. I have a question.  My CT scan mentioned pneumonia. Is it saying I have or have had it? Or is this from my recent prevanar 13 shot? I have the paperwork for the pet scam. No one has called yet to set the appt. or is this something to be done in the next 3 months or so...? Thank you, Cristela Arteaga

## 2017-08-05 NOTE — TELEPHONE ENCOUNTER
Pt notified &  given phone # to schedule, was just waiting for them daniela that's what she was told by office.  Also says she sent another my chart message that I don't see on here re: Had TDAP vaccine, c/o red splotches, swelling  muscle pain x 3 days. Had at [facility] .

## 2017-09-28 RX ORDER — LOVASTATIN 40 MG/1
TABLET ORAL
Qty: 90 TAB | Refills: 0 | Status: SHIPPED | OUTPATIENT
Start: 2017-09-28 | End: 2017-12-21 | Stop reason: SDUPTHER

## 2017-09-28 NOTE — TELEPHONE ENCOUNTER
Last seen: 07/21/17 by Dr. Read  Next appt: 12/05/17 with Dr. Ayoub    Was the patient seen in the last year in this department? Yes   Does patient have an active prescription for medications requested? No   Received Request Via: Pharmacy

## 2017-10-05 ENCOUNTER — HOSPITAL ENCOUNTER (OUTPATIENT)
Dept: RADIOLOGY | Facility: MEDICAL CENTER | Age: 67
End: 2017-10-05
Attending: INTERNAL MEDICINE
Payer: MEDICARE

## 2017-10-05 DIAGNOSIS — R91.1 PULMONARY NODULE: ICD-10-CM

## 2017-10-05 PROCEDURE — A9552 F18 FDG: HCPCS

## 2017-12-05 ENCOUNTER — OFFICE VISIT (OUTPATIENT)
Dept: INTERNAL MEDICINE | Facility: MEDICAL CENTER | Age: 67
End: 2017-12-05
Payer: MEDICARE

## 2017-12-05 VITALS
SYSTOLIC BLOOD PRESSURE: 122 MMHG | BODY MASS INDEX: 31.15 KG/M2 | HEART RATE: 86 BPM | TEMPERATURE: 98.3 F | HEIGHT: 63 IN | WEIGHT: 175.8 LBS | DIASTOLIC BLOOD PRESSURE: 78 MMHG | OXYGEN SATURATION: 99 %

## 2017-12-05 DIAGNOSIS — E78.5 DYSLIPIDEMIA: ICD-10-CM

## 2017-12-05 DIAGNOSIS — I48.91 ATRIAL FIBRILLATION, UNSPECIFIED TYPE (HCC): ICD-10-CM

## 2017-12-05 DIAGNOSIS — I10 ESSENTIAL HYPERTENSION: ICD-10-CM

## 2017-12-05 DIAGNOSIS — E55.9 VITAMIN D DEFICIENCY: ICD-10-CM

## 2017-12-05 DIAGNOSIS — R73.01 IMPAIRED FASTING GLUCOSE: ICD-10-CM

## 2017-12-05 DIAGNOSIS — Z79.01 CHRONIC ANTICOAGULATION: ICD-10-CM

## 2017-12-05 DIAGNOSIS — E66.9 OBESITY (BMI 30-39.9): ICD-10-CM

## 2017-12-05 DIAGNOSIS — Z00.00 HEALTH CARE MAINTENANCE: ICD-10-CM

## 2017-12-05 DIAGNOSIS — H35.30 MACULAR DEGENERATION: ICD-10-CM

## 2017-12-05 DIAGNOSIS — R91.1 PULMONARY NODULE: ICD-10-CM

## 2017-12-05 DIAGNOSIS — G45.8 OTHER SPECIFIED TRANSIENT CEREBRAL ISCHEMIAS: ICD-10-CM

## 2017-12-05 PROCEDURE — 99214 OFFICE O/P EST MOD 30 MIN: CPT | Performed by: INTERNAL MEDICINE

## 2017-12-06 NOTE — ASSESSMENT & PLAN NOTE
Patient denies any signs/symptoms of cardiovascular disease. Denies chest pain/pressure; denies numbness/weakness or difficulty with speech/swallowing or sudden change in vision.   Reviewed PET CT- some plaque

## 2017-12-06 NOTE — PROGRESS NOTES
"          Established Patient    Cristela Alan is a 67 y.o. female who presents today with the following:    CC: Follow-up on CT, questions regarding plaque. Review chronic issues    HPI:    Health care maintenance  Sees eye doc/dentist    Atrial fibrillation  Occas notices palpitations. Has appt this month with cards. No pre-syncope.     Chronic anticoagulation  No bleeding/bruising.    Essential hypertension  The patient denies any symptoms referable to her elevated blood pressure. Specifically denies chest pain, palpitations, dyspnea, orthopnea, PND or peripheral edema. Current medication regimen is as listed below. Patient denies any side effects of medication.        Dyslipidemia  Patient denies any signs/symptoms of cardiovascular disease. Denies chest pain/pressure; denies numbness/weakness or difficulty with speech/swallowing or sudden change in vision.   Reviewed PET CT- some plaque    Macular degeneration  Reg f/u with opthal.     Impaired fasting glucose  Patient denies polyuria, polydipsia. Is taking meds as prescribed. Is being attentive to diet. And making efforts to exercise regularly. Denies any new numbness symptoms in the feet, and does routine foot inspections. Is up-to-date routine vision screening. Patient denies chest pain, palpitations, shortness of breath, excessive fatigue.      Vitamin D deficiency  On replcmt    Obesity (BMI 30-39.9)  Reg exercise.   Diet- working on changes \"my way and slowly\"  Plans to retire- will be able to focus more.     Pulmonary nodule  No cough/hemoptysis.     TIA (transient ischemic attack)  No further issues.         ROS: As per HPI. Additional pertinent symptoms as noted below. All other systems reviewed and are negative.    ROS:  Constitutional ROS: No unexpected change in weight, No weakness, No fatigue  Eye ROS: No recent significant change in vision  Pulmonary ROS: No shortness of breath, No recent change in breathing  Cardiovascular ROS: No chest pain, " No edema, No palpitations, No syncope  Gastrointestinal ROS: No abdominal pain, No nausea, vomiting, diarrhea, or constipation  Psychiatric ROS: No depression, No anxiety    Patient Active Problem List    Diagnosis Date Noted   • Atrial fibrillation (CMS-HCC) 12/06/2012     Priority: High   • Chronic anticoagulation 12/06/2012     Priority: High   • Essential hypertension 12/06/2012     Priority: Medium   • Dyslipidemia 12/06/2012     Priority: Medium   • TIA (transient ischemic attack) 12/06/2012     Priority: Low   • Pulmonary nodule 11/09/2016   • Macular degeneration 05/10/2016   • Impaired fasting glucose 05/10/2016   • Vitamin D deficiency 05/10/2016   • Obesity (BMI 30-39.9) 05/10/2016   • Hernia of abdominal wall 05/10/2016   • Family history of cancer 05/10/2016   • Health care maintenance 05/10/2016   • Malignant neoplasm of corpus uteri, except isthmus (CMS-HCC) 08/28/2014       Social History   Substance Use Topics   • Smoking status: Never Smoker   • Smokeless tobacco: Never Used   • Alcohol use Yes      Comment: 2/year        Current Outpatient Prescriptions   Medication Sig Dispense Refill   • lovastatin (MEVACOR) 40 MG tablet TAKE ONE TABLET BY MOUTH ONE TIME DAILY 90 Tab 0   • Multiple Vitamins-Minerals (PRESERVISION AREDS PO) Take  by mouth.     • Cholecalciferol (VITAMIN D HIGH POTENCY PO) Take 1,400 Units by mouth every day.     • PRADAXA 150 MG Cap capsule TAKE ONE CAPSULE BY MOUTH TWICE DAILY 180 Cap 3   • metoprolol (LOPRESSOR) 25 MG Tab TAKE ONE TABLET BY MOUTH TWICE DAILY 180 Tab 3   • lisinopril (PRINIVIL) 5 MG Tab Take 1 Tab by mouth every day. 90 Tab 3   • Polyethylene Glycol 400 (BLINK TEARS OP) by Ophthalmic route every day.     • Calcium 600 MG TABS Take 1 Tab by mouth every morning.       • multivitamin (THERAGRAN) TABS Take 1 Tab by mouth every morning.       • Polyethyl Glycol-Propyl Glycol (SYSTANE OP) Place 1 Drop in both eyes every bedtime. Indications: **OTC**       No current  "facility-administered medications for this visit.        /78   Pulse 86   Temp 36.8 °C (98.3 °F)   Ht 1.607 m (5' 3.25\")   Wt 79.7 kg (175 lb 12.8 oz)   SpO2 99%   BMI 30.90 kg/m²     Physical Exam  General: Well developed, well nourished female, in no distress., Overweight  Eyes: Conjuntiva without any obvious injection or erythema.   Ears- canals and TMs clear  Neck- no significant adenopathy  Mouth- mucous membranes moist, no erythema  Cardiovascular: Heart is regular with no murmurs, no bruits  Lungs: Clear to auscultation bilaterally. No wheezes, rhonci or crackles heard. Respiratory effort is normal.  Neuro- A & O, grossly non-focal, CN II-XII grossly in tact   Abd: Soft, non-tender  Ext: No edema  Other: Patient in and out of chair, on and off exam table without problem or assistance.          Assessment and Plan    Recent labs were reviewed.  Recent imaging was reviewed  Consultant notes since last visit here were reviewed.       1. Health care maintenance  Declines all vaccines due to previous reactions  Up-to-date with screening otherwise    2. Atrial fibrillation, unspecified type (CMS-HCC)  Rare palpitations and no presyncope. Has upcoming follow-up with Dr. Ching    3. Chronic anticoagulation  No bleeding or bruising doing fine    4. Essential hypertension  Near/at goal No change in treatment. Discussed diet, exercise and salt restriction.  BP log scanned in    5. Dyslipidemia  Lipids are in reasonable range. The patient to continue paying attention to diet and exercise. Patient aware to go to emergency department or call 911 if any signs of cardiovascular disease- chest pain or pressure, sudden numbness or weakness in an arm or leg, sudden loss of ability to talk or swallow.  Discussed the CT scan showed plaque. Patient wants to know how to get rid of that. Discussed changing to a hypodensity statin particularly with her prediabetes. She wishes to discuss it with her cardiologist at her " upcoming visit. I offered to repeat labs prior to that visit but she wishes to wait until then.      6. Macular degeneration  Regular follow-up with ophthalmology    7. Impaired fasting glucose  The patient's last hemoglobin A1c:  Lab Results   Component Value Date/Time    HBA1C 6.2 (H) 06/12/2017 08:14 AM    HBA1C 6.2 (H) 09/14/2016 08:37 AM    HBA1C 6.1 (H) 03/14/2016 08:18 AM    HBA1C 6.2 (H) 06/01/2015 08:14 AM       We discussed the need for carbohydrate restriction, regular exercise and weight control.         8. Vitamin D deficiency  On replacement    9. Obesity (BMI 30-39.9)  Slowly working on weight loss. Maximum weight was 198    10. Pulmonary nodule  Initially found about one year ago. Most recent CT scan had some question new nodules so a pet was ordered. This was unremarkable with no uptake. I think it is still prudent to repeat one more CT scan in a year from now    11. Other specified transient cerebral ischemias  Patient has no signs of recent neurovascular event. Risk factors are controlled.          Return in about 6 months (around 6/5/2018).      Signed by:       Nancy Ayoub M.D.    This note was created using voice recognition software. There may be unintended errors in spelling, grammar or content.

## 2017-12-06 NOTE — ASSESSMENT & PLAN NOTE
"Reg exercise.   Diet- working on changes \"my way and slowly\"  Plans to retire- will be able to focus more.   "

## 2017-12-06 NOTE — PATIENT INSTRUCTIONS
Discuss changes in cholesterol med to decrease plaque with Dr Avery.  Keep working on the weight loss.

## 2017-12-21 RX ORDER — LISINOPRIL 5 MG/1
TABLET ORAL
Qty: 90 TAB | Refills: 0 | Status: SHIPPED | OUTPATIENT
Start: 2017-12-21 | End: 2018-03-22 | Stop reason: SDUPTHER

## 2017-12-21 RX ORDER — LOVASTATIN 40 MG/1
TABLET ORAL
Qty: 90 TAB | Refills: 0 | Status: SHIPPED | OUTPATIENT
Start: 2017-12-21 | End: 2018-03-22 | Stop reason: SDUPTHER

## 2017-12-21 NOTE — TELEPHONE ENCOUNTER
Last seen: 12/05/17 by Dr. Ayoub  Next appt: 06/13/18 with Dr. Canas    Was the patient seen in the last year in this department? Yes   Does patient have an active prescription for medications requested? No   Received Request Via: Pharmacy

## 2017-12-29 ENCOUNTER — OFFICE VISIT (OUTPATIENT)
Dept: CARDIOLOGY | Facility: MEDICAL CENTER | Age: 67
End: 2017-12-29
Payer: MEDICARE

## 2017-12-29 VITALS
OXYGEN SATURATION: 99 % | SYSTOLIC BLOOD PRESSURE: 130 MMHG | DIASTOLIC BLOOD PRESSURE: 82 MMHG | HEART RATE: 58 BPM | BODY MASS INDEX: 31.01 KG/M2 | HEIGHT: 63 IN | WEIGHT: 175 LBS

## 2017-12-29 DIAGNOSIS — G45.8 OTHER SPECIFIED TRANSIENT CEREBRAL ISCHEMIAS: ICD-10-CM

## 2017-12-29 DIAGNOSIS — E78.5 DYSLIPIDEMIA: ICD-10-CM

## 2017-12-29 DIAGNOSIS — I10 ESSENTIAL HYPERTENSION: ICD-10-CM

## 2017-12-29 DIAGNOSIS — Z79.01 CHRONIC ANTICOAGULATION: ICD-10-CM

## 2017-12-29 DIAGNOSIS — I48.0 PAROXYSMAL ATRIAL FIBRILLATION (HCC): ICD-10-CM

## 2017-12-29 PROCEDURE — 99214 OFFICE O/P EST MOD 30 MIN: CPT | Performed by: INTERNAL MEDICINE

## 2017-12-29 ASSESSMENT — ENCOUNTER SYMPTOMS
SHORTNESS OF BREATH: 0
BRUISES/BLEEDS EASILY: 0
COUGH: 1
DEPRESSION: 0
TINGLING: 0
INSOMNIA: 0
DIZZINESS: 0
BACK PAIN: 0
TREMORS: 0
MYALGIAS: 0
SENSORY CHANGE: 0
PND: 0
WEIGHT LOSS: 0
BLURRED VISION: 0
CHILLS: 0
HEADACHES: 0
VOMITING: 0
ABDOMINAL PAIN: 0
PALPITATIONS: 1
NAUSEA: 0
SPEECH CHANGE: 0
LOSS OF CONSCIOUSNESS: 0
NECK PAIN: 0
ORTHOPNEA: 0
FEVER: 0
NERVOUS/ANXIOUS: 0

## 2017-12-29 NOTE — PROGRESS NOTES
"Subjective:   Cristela Alan is a 67 y.o. female who presents today for annual follow up for atrial fibrillation on chronic anticoagulation therapy.    Since the patient's last visit on 12/27/16, she has been doing well clinically from cardiac standpoint. She admits to occasional palpitations. She denies chest pain, shortness of breath, nausea/vomiting or diaphoresis. She is suffering with allergies. She is back to work at school district.    Past Medical History:   asdfasdfads Date   • Allergic rhinitis    • Anesthesia     hypotension   • Atrial fibrillation (CMS-HCC)    • Cancer (CMS-[facility]) 2014    endometrial   • CATARACT     bilateral lens implants   • Cholesterol blood decreased    • Dental disorder     dentures   • Dermatitis, contact    • Dyslipidemia 6/30/2016   • Endometrium cancer (CMS-[facility])    • Hyperlipidemia    • Hypertension    • Impaired glucose tolerance    • Macular degeneration    • Obesity    • Pneumonia     had PNU 35 years ago   • Prolapsed uterus 8/2014    current issue   • Stroke (CMS-[facility])     \"mini strokes\" TIA, no residual   • TIA (transient ischemic attack)    • Unspecified hemorrhagic conditions     NOSE BLEEDS/on Plavix   • Vitamin D deficiency      Past Surgical History:   Procedure Laterality Date   • ABDOMINAL HYSTERECTOMY TOTAL     • ACETABULAR OSTEOTOMY  5/12/08    Performed by ZINA HOLLIS at SURGERY [facility]   • CATARACT PHACO WITH IOL  8/5/2010    Performed by DAVE AGUILAR at SURGERY SAME DAY [facility] ORS   • CATARACT PHACO WITH IOL  8/19/2010    Performed by DAVE AGUILAR at SURGERY SAME DAY [facility] ORS   • EYE SURGERY     • HIP ARTHROSCOPY  5/12/08    Performed by ZINA HOLLIS at SURGERY [facility]   • HYSTERECTOMY ROBOTIC XI  8/28/2014    Performed by Pantera Wylie M.D. at SURGERY [facility] ORS   • LYMPH NODE DISSECTION ROBOTIC XI  8/28/2014    Performed by Pantera Wylie M.D. at SURGERY [facility] ORS   • NASAL POLYPECTOMY  1994    \"removal of " "papilloma\"   • ORTHOPEDIC OSTEOTOMY  5/12/08    Performed by ZINA HOLLIS at SURGERY [facility]     Family History   Problem Relation Age of Onset   • Hyperlipidemia Mother    • Hypertension Mother    • Heart Disease Sister    • Alcohol/Drug Sister    • Cancer Sister 66     ? breast cancer as well   • Hypertension Sister    • Hyperlipidemia Sister    • Cancer     • Hypertension     • Diabetes Maternal Grandmother      elderly   • Stroke Neg Hx      History   Smoking Status   • Never Smoker   Smokeless Tobacco   • Never Used     Allergies   Allergen Reactions   • Fentanyl Anaphylaxis   • Versed Shortness of Breath and Vomiting   • Bee      And  spider--- tongue swelling   • Codeine      Increased heart rate   • Eggs Vomiting   • Flu Virus Vaccine    • Lidocaine Hcl      \"severe drop in BP\"   • Other Drug      Novocaine hypotension ALL \"MELISA\"   • Penicillins      Increased heart rate   • Soap Rash     Laundry Tide soap, Dove and Ivory soap   • Tape Rash     Paper tape OK   • Xylocaine [Lidocaine Hcl, Local Anesth.]      All \"MELISA\" hypotension     Medications reviewed.    Outpatient Encounter Prescriptions as of 12/29/2017   Medication Sig Dispense Refill   • lisinopril (PRINIVIL) 5 MG Tab TAKE ONE TABLET BY MOUTH ONE TIME DAILY 90 Tab 0   • lovastatin (MEVACOR) 40 MG tablet TAKE ONE TABLET BY MOUTH ONE TIME DAILY 90 Tab 0   • Multiple Vitamins-Minerals (PRESERVISION AREDS PO) Take  by mouth.     • Cholecalciferol (VITAMIN D HIGH POTENCY PO) Take 1,400 Units by mouth every day.     • PRADAXA 150 MG Cap capsule TAKE ONE CAPSULE BY MOUTH TWICE DAILY 180 Cap 3   • metoprolol (LOPRESSOR) 25 MG Tab TAKE ONE TABLET BY MOUTH TWICE DAILY 180 Tab 3   • Polyethylene Glycol 400 (BLINK TEARS OP) by Ophthalmic route every day.     • Calcium 600 MG TABS Take 1 Tab by mouth every morning.       • multivitamin (THERAGRAN) TABS Take 1 Tab by mouth every morning.       • Polyethyl Glycol-Propyl Glycol (SYSTANE OP) Place 1 " "Drop in both eyes every bedtime. Indications: **OTC**       No facility-administered encounter medications on file as of 12/29/2017.      Review of Systems   Constitutional: Negative for chills, fever and weight loss.   HENT: Negative for congestion and tinnitus.    Eyes: Negative for blurred vision.   Respiratory: Positive for cough. Negative for shortness of breath.    Cardiovascular: Positive for palpitations. Negative for chest pain, orthopnea, leg swelling and PND.   Gastrointestinal: Negative for abdominal pain, nausea and vomiting.   Genitourinary: Negative for dysuria.   Musculoskeletal: Negative for back pain, joint pain, myalgias and neck pain.   Skin: Negative for rash.   Neurological: Negative for dizziness, tingling, tremors, sensory change, speech change, loss of consciousness and headaches.   Endo/Heme/Allergies: Does not bruise/bleed easily.   Psychiatric/Behavioral: Negative for depression. The patient is not nervous/anxious and does not have insomnia.         Objective:   /82   Pulse (!) 58   Ht 1.6 m (5' 3\")   Wt 79.4 kg (175 lb)   SpO2 99%   BMI 31.00 kg/m²     Physical Exam   Constitutional: She is oriented to person, place, and time. She appears well-developed and well-nourished.   HENT:   Head: Normocephalic and atraumatic.   Eyes: Conjunctivae are normal.   Neck: Normal range of motion. Neck supple.   Cardiovascular: Normal rate and regular rhythm.    Pulmonary/Chest: Effort normal and breath sounds normal.   Abdominal: Soft. Bowel sounds are normal.   Musculoskeletal: Normal range of motion. She exhibits no edema.   Neurological: She is alert and oriented to person, place, and time.   Skin: Skin is warm and dry.   Psychiatric: She has a normal mood and affect.     CARDIAC STUDIES/PROCEDURES:    ECHOCARDIOGRAM CONCLUSIONS (12/05/12)  Technically difficult study  Normal left ventricular size, thickness, systolic function, and diastolic function.  Left ventricular ejection fraction " is 55% to 60%.  Structurally normal mitral valve.  No stenosis or regurgitation seen.  Structurally normal aortic valve.  No stenosis or regurgitation seen.  Structurally normal tricuspid valve.  Trace tricuspid regurgitation.    EKG performed on (08/24/14) EKG shows normal sinus rhythm.  EKG performed on (07/30/14) EKG shows normal sinus rhythm.  EKG performed on (12/06/12) EKG shows normal sinus rhythm.  EKG performed on (12/05/12) EKG shows atrial fibrillation.    Laboratory results of (06/12/17) were reviewed. Cholesterol profile of 169/137/54/88 noted.  Laboratory results of (09/14/16) Cholesterol profile of 151/103/55/75 noted.  Laboratory results of (06/01/15) Cholesterol profile of 190/150/54/106 noted.    MPI CONCLUSIONS (06/07/06)  NORMAL MYOCARDIAL PERFUSION IMAGES WITH NO EVIDENCE OF SIGNIFICANT   ISCHEMIA OR INFARCTION.    Assessment:     Patient Active Problem List    Diagnosis Date Noted   • Atrial fibrillation 12/06/2012     Priority: High   • Chronic anticoagulation 12/06/2012     Priority: High   • Hypertension 12/06/2012     Priority: Medium   • Hyperlipidemia 12/06/2012     Priority: Medium   • TIA (transient ischemic attack) 12/06/2012     Priority: Low     Medical Decision Making:  Today's Assessment / Status / Plan:     1. Atrial fibrillation on anticoagulation therapy (Pradaxa): Sinus rhythm is maintained without evidence of atrial fibrillation episodes on beta blockade therapy. We will continue chronic anticoagulation therapy. We will perform an echocardiogram and myocardial perfusion imaging study.  2. Hypertension: Blood pressure is well controlled.  3. Hyperlipidemia (managed by Dr. Ayoub): She is doing well on statin therapy without myalgia symptoms.  4. History of trans-ischemic attack: She remains clinically stable without any new neurological symptoms. .We will perform a carotid ultrasound.  5. Health maintenance: She is under surveillance for pulmonary nodules. She underwent  surgery for uterine cancer (08/27/14).    We will follow up the patient in one year with echocardiogram, myocardial perfusion imaging study and carotid ultrasound.    CC Nancy Gordon and Pantera Whitten

## 2018-01-08 ENCOUNTER — HOSPITAL ENCOUNTER (OUTPATIENT)
Facility: MEDICAL CENTER | Age: 68
End: 2018-01-08
Attending: SPECIALIST
Payer: MEDICARE

## 2018-01-08 LAB — CYTOLOGY REG CYTOL: NORMAL

## 2018-01-08 PROCEDURE — 88175 CYTOPATH C/V AUTO FLUID REDO: CPT

## 2018-03-08 DIAGNOSIS — I10 ESSENTIAL HYPERTENSION: ICD-10-CM

## 2018-03-08 DIAGNOSIS — I48.91 ATRIAL FIBRILLATION, UNSPECIFIED TYPE (HCC): ICD-10-CM

## 2018-03-09 RX ORDER — ATENOLOL 100 MG/1
TABLET ORAL
Qty: 180 CAP | Refills: 2 | Status: SHIPPED | OUTPATIENT
Start: 2018-03-09 | End: 2018-11-29 | Stop reason: SDUPTHER

## 2018-03-23 RX ORDER — LOVASTATIN 40 MG/1
TABLET ORAL
Qty: 90 TAB | Refills: 0 | Status: SHIPPED | OUTPATIENT
Start: 2018-03-23 | End: 2018-06-23 | Stop reason: SDUPTHER

## 2018-03-23 RX ORDER — LISINOPRIL 5 MG/1
TABLET ORAL
Qty: 90 TAB | Refills: 0 | Status: SHIPPED | OUTPATIENT
Start: 2018-03-23 | End: 2018-06-23 | Stop reason: SDUPTHER

## 2018-06-18 ENCOUNTER — HOSPITAL ENCOUNTER (OUTPATIENT)
Dept: RADIOLOGY | Facility: MEDICAL CENTER | Age: 68
End: 2018-06-18
Attending: INTERNAL MEDICINE
Payer: MEDICARE

## 2018-06-18 ENCOUNTER — HOSPITAL ENCOUNTER (OUTPATIENT)
Dept: CARDIOLOGY | Facility: MEDICAL CENTER | Age: 68
End: 2018-06-18
Attending: INTERNAL MEDICINE
Payer: MEDICARE

## 2018-06-18 DIAGNOSIS — I48.0 PAROXYSMAL ATRIAL FIBRILLATION (HCC): ICD-10-CM

## 2018-06-18 DIAGNOSIS — G45.8 OTHER SPECIFIED TRANSIENT CEREBRAL ISCHEMIAS: ICD-10-CM

## 2018-06-18 LAB
LV EJECT FRACT  99904: 75
LV EJECT FRACT MOD 2C 99903: 56.59
LV EJECT FRACT MOD 4C 99902: 66.77
LV EJECT FRACT MOD BP 99901: 58.18

## 2018-06-18 PROCEDURE — A9502 TC99M TETROFOSMIN: HCPCS

## 2018-06-18 PROCEDURE — 93306 TTE W/DOPPLER COMPLETE: CPT | Mod: 26 | Performed by: INTERNAL MEDICINE

## 2018-06-18 PROCEDURE — 93306 TTE W/DOPPLER COMPLETE: CPT

## 2018-06-18 PROCEDURE — 700111 HCHG RX REV CODE 636 W/ 250 OVERRIDE (IP)

## 2018-06-18 PROCEDURE — 93880 EXTRACRANIAL BILAT STUDY: CPT

## 2018-06-18 PROCEDURE — 93880 EXTRACRANIAL BILAT STUDY: CPT | Mod: 26 | Performed by: INTERNAL MEDICINE

## 2018-06-18 RX ORDER — REGADENOSON 0.08 MG/ML
INJECTION, SOLUTION INTRAVENOUS
Status: COMPLETED
Start: 2018-06-18 | End: 2018-06-18

## 2018-06-18 RX ADMIN — REGADENOSON 0.4 MG: 0.08 INJECTION, SOLUTION INTRAVENOUS at 09:00

## 2018-06-19 ENCOUNTER — TELEPHONE (OUTPATIENT)
Dept: CARDIOLOGY | Facility: MEDICAL CENTER | Age: 68
End: 2018-06-19

## 2018-06-19 NOTE — TELEPHONE ENCOUNTER
Message   Received: Yesterday   Message Contents         ----- Message -----   From: Demetris Avery M.D.   Sent: 6/18/2018  10:27 AM   To: Sangeetha Rubio R.N.     Please call with unremarkable study, echocardiogram.     Thanks.  LINDSEY        MPI and carotid duplex also unremarkable.     Notified pt of results of all testing and answered her questions.she will call if any concerns otherwise will follow-up in December with LINDSEY

## 2018-06-25 ENCOUNTER — TELEPHONE (OUTPATIENT)
Dept: INTERNAL MEDICINE | Facility: MEDICAL CENTER | Age: 68
End: 2018-06-25

## 2018-06-25 DIAGNOSIS — E55.9 VITAMIN D DEFICIENCY: ICD-10-CM

## 2018-06-25 DIAGNOSIS — R73.01 IMPAIRED FASTING GLUCOSE: ICD-10-CM

## 2018-06-25 DIAGNOSIS — E78.5 DYSLIPIDEMIA: ICD-10-CM

## 2018-06-25 DIAGNOSIS — I10 ESSENTIAL HYPERTENSION: ICD-10-CM

## 2018-07-19 ENCOUNTER — HOSPITAL ENCOUNTER (OUTPATIENT)
Dept: LAB | Facility: MEDICAL CENTER | Age: 68
End: 2018-07-19
Attending: INTERNAL MEDICINE
Payer: MEDICARE

## 2018-07-19 ENCOUNTER — HOSPITAL ENCOUNTER (OUTPATIENT)
Dept: LAB | Facility: MEDICAL CENTER | Age: 68
End: 2018-07-19
Attending: SPECIALIST
Payer: MEDICARE

## 2018-07-19 DIAGNOSIS — R73.01 IMPAIRED FASTING GLUCOSE: ICD-10-CM

## 2018-07-19 DIAGNOSIS — E78.5 DYSLIPIDEMIA: ICD-10-CM

## 2018-07-19 DIAGNOSIS — I10 ESSENTIAL HYPERTENSION: ICD-10-CM

## 2018-07-19 DIAGNOSIS — E55.9 VITAMIN D DEFICIENCY: ICD-10-CM

## 2018-07-19 LAB
25(OH)D3 SERPL-MCNC: 48 NG/ML (ref 30–100)
ALBUMIN SERPL BCP-MCNC: 3.8 G/DL (ref 3.2–4.9)
ALBUMIN SERPL BCP-MCNC: 4.1 G/DL (ref 3.2–4.9)
ALBUMIN/GLOB SERPL: 1.4 G/DL
ALBUMIN/GLOB SERPL: 1.5 G/DL
ALP SERPL-CCNC: 67 U/L (ref 30–99)
ALP SERPL-CCNC: 73 U/L (ref 30–99)
ALT SERPL-CCNC: 13 U/L (ref 2–50)
ALT SERPL-CCNC: 13 U/L (ref 2–50)
ANION GAP SERPL CALC-SCNC: 5 MMOL/L (ref 0–11.9)
ANION GAP SERPL CALC-SCNC: 8 MMOL/L (ref 0–11.9)
AST SERPL-CCNC: 16 U/L (ref 12–45)
AST SERPL-CCNC: 17 U/L (ref 12–45)
BASOPHILS # BLD AUTO: 1.6 % (ref 0–1.8)
BASOPHILS # BLD: 0.08 K/UL (ref 0–0.12)
BILIRUB SERPL-MCNC: 0.8 MG/DL (ref 0.1–1.5)
BILIRUB SERPL-MCNC: 0.8 MG/DL (ref 0.1–1.5)
BUN SERPL-MCNC: 14 MG/DL (ref 8–22)
BUN SERPL-MCNC: 15 MG/DL (ref 8–22)
CALCIUM SERPL-MCNC: 9.3 MG/DL (ref 8.5–10.5)
CALCIUM SERPL-MCNC: 9.5 MG/DL (ref 8.5–10.5)
CHLORIDE SERPL-SCNC: 105 MMOL/L (ref 96–112)
CHLORIDE SERPL-SCNC: 107 MMOL/L (ref 96–112)
CHOLEST SERPL-MCNC: 178 MG/DL (ref 100–199)
CO2 SERPL-SCNC: 27 MMOL/L (ref 20–33)
CO2 SERPL-SCNC: 29 MMOL/L (ref 20–33)
CREAT SERPL-MCNC: 0.85 MG/DL (ref 0.5–1.4)
CREAT SERPL-MCNC: 0.93 MG/DL (ref 0.5–1.4)
EOSINOPHIL # BLD AUTO: 0.1 K/UL (ref 0–0.51)
EOSINOPHIL NFR BLD: 2.1 % (ref 0–6.9)
ERYTHROCYTE [DISTWIDTH] IN BLOOD BY AUTOMATED COUNT: 46.1 FL (ref 35.9–50)
EST. AVERAGE GLUCOSE BLD GHB EST-MCNC: 128 MG/DL
GLOBULIN SER CALC-MCNC: 2.8 G/DL (ref 1.9–3.5)
GLOBULIN SER CALC-MCNC: 2.8 G/DL (ref 1.9–3.5)
GLUCOSE SERPL-MCNC: 113 MG/DL (ref 65–99)
GLUCOSE SERPL-MCNC: 114 MG/DL (ref 65–99)
HBA1C MFR BLD: 6.1 % (ref 0–5.6)
HCT VFR BLD AUTO: 43.6 % (ref 37–47)
HDLC SERPL-MCNC: 53 MG/DL
HGB BLD-MCNC: 14.1 G/DL (ref 12–16)
IMM GRANULOCYTES # BLD AUTO: 0.02 K/UL (ref 0–0.11)
IMM GRANULOCYTES NFR BLD AUTO: 0.4 % (ref 0–0.9)
LDLC SERPL CALC-MCNC: 100 MG/DL
LYMPHOCYTES # BLD AUTO: 1.11 K/UL (ref 1–4.8)
LYMPHOCYTES NFR BLD: 22.9 % (ref 22–41)
MCH RBC QN AUTO: 30.2 PG (ref 27–33)
MCHC RBC AUTO-ENTMCNC: 32.3 G/DL (ref 33.6–35)
MCV RBC AUTO: 93.4 FL (ref 81.4–97.8)
MONOCYTES # BLD AUTO: 0.5 K/UL (ref 0–0.85)
MONOCYTES NFR BLD AUTO: 10.3 % (ref 0–13.4)
NEUTROPHILS # BLD AUTO: 3.04 K/UL (ref 2–7.15)
NEUTROPHILS NFR BLD: 62.7 % (ref 44–72)
NRBC # BLD AUTO: 0 K/UL
NRBC BLD-RTO: 0 /100 WBC
PLATELET # BLD AUTO: 284 K/UL (ref 164–446)
PMV BLD AUTO: 9.7 FL (ref 9–12.9)
POTASSIUM SERPL-SCNC: 4.5 MMOL/L (ref 3.6–5.5)
POTASSIUM SERPL-SCNC: 4.6 MMOL/L (ref 3.6–5.5)
PROT SERPL-MCNC: 6.6 G/DL (ref 6–8.2)
PROT SERPL-MCNC: 6.9 G/DL (ref 6–8.2)
RBC # BLD AUTO: 4.67 M/UL (ref 4.2–5.4)
SODIUM SERPL-SCNC: 140 MMOL/L (ref 135–145)
SODIUM SERPL-SCNC: 141 MMOL/L (ref 135–145)
TRIGL SERPL-MCNC: 126 MG/DL (ref 0–149)
WBC # BLD AUTO: 4.9 K/UL (ref 4.8–10.8)

## 2018-07-19 PROCEDURE — 80053 COMPREHEN METABOLIC PANEL: CPT

## 2018-07-19 PROCEDURE — 83036 HEMOGLOBIN GLYCOSYLATED A1C: CPT

## 2018-07-19 PROCEDURE — 85025 COMPLETE CBC W/AUTO DIFF WBC: CPT

## 2018-07-19 PROCEDURE — 80053 COMPREHEN METABOLIC PANEL: CPT | Mod: 91

## 2018-07-19 PROCEDURE — 82306 VITAMIN D 25 HYDROXY: CPT

## 2018-07-19 PROCEDURE — 36415 COLL VENOUS BLD VENIPUNCTURE: CPT

## 2018-07-19 PROCEDURE — 80061 LIPID PANEL: CPT

## 2018-07-25 ENCOUNTER — OFFICE VISIT (OUTPATIENT)
Dept: INTERNAL MEDICINE | Facility: MEDICAL CENTER | Age: 68
End: 2018-07-25
Payer: MEDICARE

## 2018-07-25 VITALS
SYSTOLIC BLOOD PRESSURE: 125 MMHG | HEART RATE: 66 BPM | WEIGHT: 175 LBS | RESPIRATION RATE: 18 BRPM | OXYGEN SATURATION: 94 % | DIASTOLIC BLOOD PRESSURE: 75 MMHG | HEIGHT: 63 IN | TEMPERATURE: 97 F | BODY MASS INDEX: 31.01 KG/M2

## 2018-07-25 DIAGNOSIS — E78.5 DYSLIPIDEMIA: ICD-10-CM

## 2018-07-25 DIAGNOSIS — I10 ESSENTIAL HYPERTENSION: ICD-10-CM

## 2018-07-25 DIAGNOSIS — I48.0 PAROXYSMAL ATRIAL FIBRILLATION (HCC): ICD-10-CM
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
## 2018-07-25 DIAGNOSIS — I83.93 VARICOSE VEINS OF BOTH LOWER EXTREMITIES: ICD-10-CM

## 2018-07-25 DIAGNOSIS — C54.9 MALIGNANT NEOPLASM OF CORPUS UTERI, EXCEPT ISTHMUS (HCC): ICD-10-CM

## 2018-07-25 PROCEDURE — 99214 OFFICE O/P EST MOD 30 MIN: CPT | Mod: GC | Performed by: INTERNAL MEDICINE

## 2018-07-25 RX ORDER — LISINOPRIL 5 MG/1
5 TABLET ORAL DAILY
Qty: 60 TAB | Refills: 0 | Status: SHIPPED | OUTPATIENT
Start: 2018-07-25 | End: 2018-08-03 | Stop reason: SDUPTHER

## 2018-07-25 RX ORDER — SULFAMETHOXAZOLE AND TRIMETHOPRIM 400; 80 MG/1; MG/1
1 TABLET ORAL 2 TIMES DAILY
Qty: 10 TAB | Refills: 0 | Status: SHIPPED | OUTPATIENT
Start: 2018-07-25 | End: 2018-12-14

## 2018-07-25 RX ORDER — LOVASTATIN 40 MG/1
40 TABLET ORAL DAILY
Qty: 60 TAB | Refills: 0 | Status: SHIPPED | OUTPATIENT
Start: 2018-07-25 | End: 2018-08-03 | Stop reason: SDUPTHER

## 2018-07-25 ASSESSMENT — PATIENT HEALTH QUESTIONNAIRE - PHQ9: CLINICAL INTERPRETATION OF PHQ2 SCORE: 0

## 2018-07-25 NOTE — PROGRESS NOTES
Established Patient    Cristela presents today with the following:    CC:   Chief Complaint   Patient presents with   • Medication Refill     lovastatin and lisinopril     HPI:   The patient is a 67-year-old female with past medical history of essential hypertension, atrial fibrillation, dyslipidemia, malignant neoplasm of uterus presented to the clinic for medication refill.  -Patient reports she has been doing well and has no recent ER visits or intercurrent illnesses.   -One week ago her cat scratched her over the right hand and has noticed swelling, redness in the area locally associated with pus on day 1 and 2 no active drainage. Denies fevers, chills, myalgias, arthralgias, swollen glands,rash.  -She has recently seen her gynecologist for follow-up of her stage I uterine cancer, PET scan recently showed no signs of cancer recurrence.  -she stated she is also following with her Cardiologist for atrial fbrillation, recently had echocardiogram which was normal. Denies any recent cardiovascular symptoms of irregular heartbeat, palpitations, fluttering in the chest, dizziness, lower extremity edema.  -Denies tobacco, and drug use. Drinks alcohol occasionally.    Patient Active Problem List    Diagnosis Date Noted   • Atrial fibrillation (HCC) 12/06/2012     Priority: High   • Chronic anticoagulation 12/06/2012     Priority: High   • Essential hypertension 12/06/2012     Priority: Medium   • Dyslipidemia 12/06/2012     Priority: Medium   • TIA (transient ischemic attack) 12/06/2012     Priority: Low   • Pulmonary nodule 11/09/2016   • Macular degeneration 05/10/2016   • Impaired fasting glucose 05/10/2016   • Vitamin D deficiency 05/10/2016   • Obesity (BMI 30-39.9) 05/10/2016   • Hernia of abdominal wall 05/10/2016   • Family history of cancer 05/10/2016   • Health care maintenance 05/10/2016   • Malignant neoplasm of corpus uteri, except isthmus (CMS-HCC) 08/28/2014       Current Outpatient Prescriptions  "  Medication Sig Dispense Refill   • lisinopril (PRINIVIL) 5 MG Tab Take 1 Tab by mouth every day. 60 Tab 0   • lovastatin (MEVACOR) 40 MG tablet Take 1 Tab by mouth every day. 60 Tab 0   • sulfamethoxazole-trimethoprim (BACTRIM) 400-80 MG Tab Take 1 Tab by mouth 2 times a day. 10 Tab 0   • metoprolol (LOPRESSOR) 25 MG Tab TAKE ONE TABLET BY MOUTH TWICE DAILY 180 Tab 2   • PRADAXA 150 MG Cap capsule TAKE ONE CAPSULE BY MOUTH TWICE DAILY 180 Cap 2   • Multiple Vitamins-Minerals (PRESERVISION AREDS PO) Take  by mouth.     • Cholecalciferol (VITAMIN D HIGH POTENCY PO) Take 1,400 Units by mouth every day.     • Polyethylene Glycol 400 (BLINK TEARS OP) by Ophthalmic route every day.     • Calcium 600 MG TABS Take 1 Tab by mouth every morning.       • multivitamin (THERAGRAN) TABS Take 1 Tab by mouth every morning.       • Polyethyl Glycol-Propyl Glycol (SYSTANE OP) Place 1 Drop in both eyes every bedtime. Indications: **OTC**       No current facility-administered medications for this visit.        ROS: As per HPI. Additional pertinent symptoms as noted below.    All others negative    /75   Pulse 66   Temp 36.1 °C (97 °F)   Resp 18   Ht 1.6 m (5' 3\")   Wt 79.4 kg (175 lb)   SpO2 94%   BMI 31.00 kg/m²     Physical Exam   Constitutional:  oriented to person, place, and time. No distress.   Eyes: Pupils are equal, round, and reactive to light. No scleral icterus.  Neck: Neck supple. No thyromegaly present.   Cardiovascular: Normal rate, regular rhythm and normal heart sounds.  Exam reveals no gallop and no friction rub.  No murmur heard.  Pulmonary/Chest: Breath sounds normal. Chest wall is not dull to percussion.   Musculoskeletal:   no edema.   Lymphadenopathy: no cervical adenopathy  Neurological: alert and oriented to person, place, and time.   Skin: No cyanosis. Nails show no clubbing.  There is a small lesion over the right hand dorsal surface at the base of the first digit that is erythematous with a " scab on the wound.  No axillary lymphadenopathy present bilaterally.    Note: I have reviewed all pertinent labs and diagnostic tests associated with this visit with specific comments listed under the assessment and plan below    Assessment and Plan    1. Paroxysmal atrial fibrillation (HCC)  -Patient has a history of paroxysmal atrial fibrillation, currently on Pradaxa and metoprolol.  -Had a recent echocardiogram which showed ejection fraction of 70% and following with Dr. Avery cardiologist.  -Stable and the patient is advised to continue the same medications.    2. Malignant neoplasm of corpus uteri, except isthmus (CMS-HCC)  -Patient has a history of malignant neoplasm of uterus stage I status post hysterectomy, lymph node removal.  -Patient had a recent PET scan which showed no signs of cancer recurrence.  -Following currently with her gynecologist oncologist Dr. Pantera Wylie    3. Essential hypertension  Vitals  Blood Pressure : 125/75  Pulse: 66  -Blood pressure is well controlled  -Continue Lisinopril 5 mg Daily.  4. Dyslipidemia  Lab Results   Component Value Date/Time    CHOLSTRLTOT 178 07/19/2018 08:56 AM     (H) 07/19/2018 08:56 AM    HDL 53 07/19/2018 08:56 AM    TRIGLYCERIDE 126 07/19/2018 08:56 AM       -Continue Lovastatin 40 mg daily  -She has 50% stenosis of the carotid artery.     5. Cat scratch  -Patient presented with complaints of Scratch one week ago over her right hand. She noticed swelling and pus that is not improving, no active drainage. Denies any fever, chills, arthralgias, myalgias, painful swollen glands.  -Stable vitals in the office today patient is afebrile, normal blood pressure and heart rate.  -On exam there is a small reddish lesion over the dorsal surface of right hand at the base of the 1st digit. No axillary lymphadenopathy.  -Patient is advised to take trimethoprim sulfamethoxazole for 5 days as the patient is allergic to penicillins.    6. Varicose veins of both lower  extremities  -Patient has bilateral varicose veins in the both extremities around the ankle area, trace edema present.  -Advised the patient to use compression stockings bilaterally and keep the feet elevated on lying down.      Followup: Return As scheduled with PCP .      Signed by: Ainsley Blackmon M.D.\

## 2018-08-03 ENCOUNTER — OFFICE VISIT (OUTPATIENT)
Dept: INTERNAL MEDICINE | Facility: MEDICAL CENTER | Age: 68
End: 2018-08-03
Payer: MEDICARE

## 2018-08-03 VITALS
HEART RATE: 70 BPM | SYSTOLIC BLOOD PRESSURE: 98 MMHG | TEMPERATURE: 97.2 F | DIASTOLIC BLOOD PRESSURE: 64 MMHG | WEIGHT: 177.4 LBS | BODY MASS INDEX: 31.43 KG/M2 | OXYGEN SATURATION: 93 % | HEIGHT: 63 IN

## 2018-08-03 DIAGNOSIS — I48.0 PAROXYSMAL ATRIAL FIBRILLATION (HCC): ICD-10-CM

## 2018-08-03 DIAGNOSIS — I10 ESSENTIAL HYPERTENSION: ICD-10-CM

## 2018-08-03 DIAGNOSIS — C54.9 MALIGNANT NEOPLASM OF CORPUS UTERI, EXCEPT ISTHMUS (HCC): ICD-10-CM

## 2018-08-03 DIAGNOSIS — E78.5 DYSLIPIDEMIA: ICD-10-CM

## 2018-08-03 DIAGNOSIS — R73.01 IMPAIRED FASTING GLUCOSE: ICD-10-CM

## 2018-08-03 DIAGNOSIS — Z79.01 CHRONIC ANTICOAGULATION: ICD-10-CM

## 2018-08-03 DIAGNOSIS — R91.1 PULMONARY NODULE: ICD-10-CM

## 2018-08-03 PROCEDURE — 99214 OFFICE O/P EST MOD 30 MIN: CPT | Performed by: INTERNAL MEDICINE

## 2018-08-03 RX ORDER — LISINOPRIL 5 MG/1
5 TABLET ORAL DAILY
Qty: 90 TAB | Refills: 4 | Status: SHIPPED | OUTPATIENT
Start: 2018-08-03 | End: 2018-09-06 | Stop reason: SDUPTHER

## 2018-08-03 RX ORDER — LOVASTATIN 40 MG/1
40 TABLET ORAL DAILY
Qty: 90 TAB | Refills: 4 | Status: SHIPPED | OUTPATIENT
Start: 2018-08-03 | End: 2018-09-06 | Stop reason: SDUPTHER

## 2018-08-03 ASSESSMENT — PAIN SCALES - GENERAL: PAINLEVEL: NO PAIN

## 2018-08-03 NOTE — PROGRESS NOTES
Established Patient    Cristela presents today with the following:    CC: Follow up on cat scratch and establish with new PCP    HPI: Patient is a very nice 67 year old lady with Hx of Atrial fibrilation on Pradaxa and metoprolol, no symptoms, uterine cancer s/p hysterectomy, DM controlled with diet, last A1C was 6.1. Hyperlipidemia on lovastatin last , Is here to day to establish care with me and to follow up on cat scratch.    I saw her with Dr. Blackmon on 7/25 with cat scratch on her Right hand , she was started on Bactrim and today it is all healed. She has some pinkish scar, no erythema , no tendernes, no warmth, No systemic symptoms.    Patient Active Problem List    Diagnosis Date Noted   • Atrial fibrillation (HCC) 12/06/2012     Priority: High   • Chronic anticoagulation 12/06/2012     Priority: High   • Essential hypertension 12/06/2012     Priority: Medium   • Dyslipidemia 12/06/2012     Priority: Medium   • TIA (transient ischemic attack) 12/06/2012     Priority: Low   • Pulmonary nodule 11/09/2016   • Macular degeneration 05/10/2016   • Impaired fasting glucose 05/10/2016   • Vitamin D deficiency 05/10/2016   • Obesity (BMI 30-39.9) 05/10/2016   • Hernia of abdominal wall 05/10/2016   • Family history of cancer 05/10/2016   • Health care maintenance 05/10/2016   • Malignant neoplasm of corpus uteri, except isthmus (CMS-HCC) 08/28/2014       Current Outpatient Prescriptions   Medication Sig Dispense Refill   • lisinopril (PRINIVIL) 5 MG Tab Take 1 Tab by mouth every day. 90 Tab 4   • lovastatin (MEVACOR) 40 MG tablet Take 1 Tab by mouth every day. 90 Tab 4   • sulfamethoxazole-trimethoprim (BACTRIM) 400-80 MG Tab Take 1 Tab by mouth 2 times a day. 10 Tab 0   • metoprolol (LOPRESSOR) 25 MG Tab TAKE ONE TABLET BY MOUTH TWICE DAILY 180 Tab 2   • PRADAXA 150 MG Cap capsule TAKE ONE CAPSULE BY MOUTH TWICE DAILY 180 Cap 2   • Multiple Vitamins-Minerals (PRESERVISION AREDS PO) Take  by mouth.     •  "Cholecalciferol (VITAMIN D HIGH POTENCY PO) Take 1,400 Units by mouth every day.     • Polyethylene Glycol 400 (BLINK TEARS OP) by Ophthalmic route every day.     • Calcium 600 MG TABS Take 1 Tab by mouth every morning.       • multivitamin (THERAGRAN) TABS Take 1 Tab by mouth every morning.       • Polyethyl Glycol-Propyl Glycol (SYSTANE OP) Place 1 Drop in both eyes every bedtime. Indications: **OTC**       No current facility-administered medications for this visit.        ROS: As per HPI. 14 points of ROS were asked, All systems reviewed. and were all negative       BP (!) 98/64   Pulse 70   Temp 36.2 °C (97.2 °F)   Ht 1.6 m (5' 3\")   Wt 80.5 kg (177 lb 6.4 oz)   SpO2 93%   Breastfeeding? No   BMI 31.42 kg/m²     Physical Exam   Constitutional:  oriented to person, place, and time. No distress.   Eyes: Pupils are equal, round, and reactive to light. No scleral icterus.  Neck: Neck supple. No thyromegaly present.   Cardiovascular: Normal rate, regular rhythm and normal heart sounds.  Exam reveals no gallop and no friction rub.  No murmur heard.  Pulmonary/Chest: Breath sounds normal. Chest wall is not dull to percussion.   Musculoskeletal:   no edema.   Lymphadenopathy: no cervical adenopathy  Neurological: alert and oriented to person, place, and time.   Skin: No cyanosis. Nails show no clubbing. Small pinkish scar over the dorsal aspect of R hand.      Note: I have reviewed all pertinent labs and diagnostic tests associated with this visit with specific comments listed under the assessment and plan below    Assessment and Plan    1. Paroxysmal atrial fibrillation (HCC)  2. Chronic anticoagulation  No Symptoms at all, she is on Pradaxa and metoprolol.rate under good control. Continue same management.    3. Dyslipidemia  On lovastatin 40 mg daily. Last LDL is 100. Liver enzymes WNL. Continue same management.    4. Impaired fasting glucose  Last A1c was 6.1. Continue diet.    5. Pulmonary nodule  Has a CT " scan pending for follow up.    6. Malignant neoplasm of corpus uteri, except isthmus (CMS-HCC)  Under care of Dr. Wylie. No symptoms.    7. Essential hypertension  Her BP is on the low side, she is on lisinopril and Metoprolol. She has no symptoms, I educated her to drink more fluids and to check her BP eery day if it continues to be low to call us and I will adjust her medications. She agrees.    8- Cat Scratch lesion on R hand healed well, took Bactrim      Followup: Return in about 6 months (around 2/3/2019).      Signed by: Prabha Casas M.D.

## 2018-09-06 DIAGNOSIS — E78.5 DYSLIPIDEMIA: ICD-10-CM

## 2018-09-06 DIAGNOSIS — I10 ESSENTIAL HYPERTENSION: ICD-10-CM

## 2018-09-06 RX ORDER — LOVASTATIN 40 MG/1
40 TABLET ORAL DAILY
Qty: 90 TAB | Refills: 0 | Status: SHIPPED | OUTPATIENT
Start: 2018-09-06 | End: 2018-12-17 | Stop reason: SDUPTHER

## 2018-09-06 RX ORDER — LISINOPRIL 5 MG/1
5 TABLET ORAL DAILY
Qty: 90 TAB | Refills: 3 | Status: SHIPPED | OUTPATIENT
Start: 2018-09-06 | End: 2020-06-04 | Stop reason: SDUPTHER

## 2018-09-06 NOTE — TELEPHONE ENCOUNTER
PT SEEN: 8/3/18 WITH Dr. Casas NEXT APPT none   Was the patient seen in the last year in this department? Yes     Does patient have an active prescription for medications requested? No     Received Request Via: Patient

## 2018-09-06 NOTE — TELEPHONE ENCOUNTER
Regarding: Prescription Question  Contact: [phone]  ----- Message from Nia England, Med Ass't sent at 9/5/2018  1:36 PM PDT -----       ----- Message from Cristela Alan to Prabha Casas M.D. sent at 9/5/2018  1:31 PM -----   I need my refills for lovastatin & lisinopril please. Thank you Cristela Alan

## 2018-09-07 NOTE — TELEPHONE ENCOUNTER
I called her. She states that her Bps are mainly in 120/70s. Only one time was 93/58. Highest was 145/90. Advised her to continue to check her BP every day and if below 100/60 to let us know. She agrees.

## 2018-10-09 ENCOUNTER — HOSPITAL ENCOUNTER (OUTPATIENT)
Dept: RADIOLOGY | Facility: MEDICAL CENTER | Age: 68
End: 2018-10-09
Attending: INTERNAL MEDICINE
Payer: MEDICARE

## 2018-10-09 DIAGNOSIS — Z12.31 BREAST CANCER SCREENING BY MAMMOGRAM: ICD-10-CM

## 2018-10-09 PROCEDURE — 77067 SCR MAMMO BI INCL CAD: CPT

## 2018-10-23 ENCOUNTER — PATIENT OUTREACH (OUTPATIENT)
Dept: HEALTH INFORMATION MANAGEMENT | Facility: OTHER | Age: 68
End: 2018-10-23

## 2018-10-23 NOTE — PROGRESS NOTES
1. Attempt #:1    2. [facility] Verified: yes    3. Verify PCP: yes    4. Review Care Team: yes    5. WebIZ Checked & Epic Updated: Yes  · WebIZ Recommendations: FLU and SHINGRIX (Shingles)  · Is patient due for Tdap? NO  · Is patient due for Shingles? YES. Patient was not notified of copay/out of pocket cost.    6. Reviewed/Updated the following with patient:       •   Communication Preference Obtained? YES       •   Preferred Pharmacy? YES       •   Preferred Lab? YES       •   Family History (document living status of immediate family members and if + hx of cancer, diabetes, hypertension, hyperlipidemia, heart attack, stroke) YES. Was Abstract Encounter opened and chart updated? YES    7. Annual Wellness Visit Scheduling  · Scheduling Status:Scheduled          9. Care Gap Scheduling (Attempt to Schedule EACH Overdue Care Gap!)     Health Maintenance Due   Topic Date Due   • Annual Wellness Visit  1950   • IMM ZOSTER VACCINES (1 of 2) 09/24/1998        Scheduled patient for Annual Wellness Visit  Patient declined Immunizations: FLU.     10. [facility] Activation: already active    11. [facility] [place]: no    12. Virtual Visits: no    13. Opt In to Text Messages: no    14. Patient was advised: “This is a free wellness visit. The provider will screen for medical conditions to help you stay healthy. If you have other concerns to address you may be asked to discuss these at a separate visit or there may be an additional fee.”     15. Patient was informed to arrive 15 min prior to their scheduled appointment and bring in their medication bottles.

## 2018-10-29 ENCOUNTER — OFFICE VISIT (OUTPATIENT)
Dept: INTERNAL MEDICINE | Facility: MEDICAL CENTER | Age: 68
End: 2018-10-29
Payer: MEDICARE

## 2018-10-29 VITALS
RESPIRATION RATE: 16 BRPM | SYSTOLIC BLOOD PRESSURE: 119 MMHG | HEIGHT: 63 IN | BODY MASS INDEX: 32.43 KG/M2 | TEMPERATURE: 98.2 F | DIASTOLIC BLOOD PRESSURE: 77 MMHG | WEIGHT: 183 LBS | HEART RATE: 75 BPM | OXYGEN SATURATION: 97 %

## 2018-10-29 DIAGNOSIS — Z00.00 ENCOUNTER FOR MEDICARE ANNUAL WELLNESS EXAM: ICD-10-CM

## 2018-10-29 DIAGNOSIS — I48.0 PAROXYSMAL ATRIAL FIBRILLATION (HCC): ICD-10-CM

## 2018-10-29 DIAGNOSIS — I10 ESSENTIAL HYPERTENSION: ICD-10-CM

## 2018-10-29 DIAGNOSIS — E78.5 DYSLIPIDEMIA: ICD-10-CM

## 2018-10-29 PROCEDURE — 99397 PER PM REEVAL EST PAT 65+ YR: CPT | Performed by: INTERNAL MEDICINE

## 2018-10-29 ASSESSMENT — PAIN SCALES - GENERAL: PAINLEVEL: NO PAIN

## 2018-10-29 NOTE — PROGRESS NOTES
Chief Complaint   Patient presents with   • Annual Exam     wellness check up          HPI:  Cristela Alan is a 68 y.o. here for Medicare Annual Wellness Visit     Patient Active Problem List    Diagnosis Date Noted   • Atrial fibrillation (HCC) 12/06/2012     Priority: High   • Chronic anticoagulation 12/06/2012     Priority: High   • Essential hypertension 12/06/2012     Priority: Medium   • Dyslipidemia 12/06/2012     Priority: Medium   • TIA (transient ischemic attack) 12/06/2012     Priority: Low   • Pulmonary nodule 11/09/2016   • Macular degeneration 05/10/2016   • Impaired fasting glucose 05/10/2016   • Vitamin D deficiency 05/10/2016   • Obesity (BMI 30-39.9) 05/10/2016   • Hernia of abdominal wall 05/10/2016   • Family history of cancer 05/10/2016   • Health care maintenance 05/10/2016   • Malignant neoplasm of corpus uteri, except isthmus (CMS-HCC) 08/28/2014       Current Outpatient Prescriptions   Medication Sig Dispense Refill   • lisinopril (PRINIVIL) 5 MG Tab Take 1 Tab by mouth every day. 90 Tab 3   • lovastatin (MEVACOR) 40 MG tablet Take 1 Tab by mouth every day. 90 Tab 0   • metoprolol (LOPRESSOR) 25 MG Tab TAKE ONE TABLET BY MOUTH TWICE DAILY 180 Tab 2   • PRADAXA 150 MG Cap capsule TAKE ONE CAPSULE BY MOUTH TWICE DAILY 180 Cap 2   • Multiple Vitamins-Minerals (PRESERVISION AREDS PO) Take  by mouth.     • Cholecalciferol (VITAMIN D HIGH POTENCY PO) Take 1,400 Units by mouth every day.     • Polyethylene Glycol 400 (BLINK TEARS OP) by Ophthalmic route every day.     • Calcium 600 MG TABS Take 1 Tab by mouth every morning.       • multivitamin (THERAGRAN) TABS Take 1 Tab by mouth every morning.       • Polyethyl Glycol-Propyl Glycol (SYSTANE OP) Place 1 Drop in both eyes every bedtime. Indications: **OTC**     • sulfamethoxazole-trimethoprim (BACTRIM) 400-80 MG Tab Take 1 Tab by mouth 2 times a day. (Patient not taking: Reported on 10/29/2018) 10 Tab 0     No current facility-administered  medications for this visit.             Current supplements as per medication list.       Allergies: Fentanyl; Versed; Bee; Codeine; Eggs; Flu virus vaccine; Lidocaine hcl; Other drug; Penicillins; Soap; Tape; and Xylocaine [lidocaine hcl, local anesth.]    Current social contact/activities: She has a  and daughter that she spend most of her time with them. He parents and siblings are all passed away.    She  reports that she has never smoked. She has never used smokeless tobacco. She reports that she does not drink alcohol or use drugs.  Counseling given: Not Answered      DPA/Advanced Directive:    She dose not have an advance directive and refuses to have one. However she wants to be full code and receives full treatment.    ROS:    Gait: Uses no assistive device  Ostomy: No  Other tubes: No  Amputations: No  Chronic oxygen use: No  Last eye exam: she has an ophthalmologist and is s/p cataract surgery and lens placement. She is due to see her ophthalmologist and is going to make an appointment .  Wears hearing aids: No   : Denies any urinary leakage during the last 6 months    Screening:    Depression Screening       Little interest or pleasure in doing things? 0   Feeling down, depressed , or hopeless?  0  Trouble falling or staying asleep, or sleeping too much? 0     Feeling tired or having little energy? 0    Poor appetite or overeating? 0    Feeling bad about yourself - or that you are a failure or have let yourself or your family down? 0   Trouble concentrating on things, such as reading the newspaper or watching television?  0  Moving or speaking so slowly that other people could have noticed.  Or the opposite - being so fidgety or restless that you have been moving around a lot more than usual? 0    Thoughts that you would be better off dead, or of hurting yourself? 0    Patient Health Questionnaire Score:   0         If depressive symptoms identified deferred to follow up visit unless  specifically addressed in assessment and plan.    Interpretation of PHQ-9 Total Score   Score Severity   1-4 No Depression   5-9 Mild Depression   10-14 Moderate Depression   15-19 Moderately Severe Depression   20-27 Severe Depression      Screening for Cognitive Impairment    Patient declines any memory loss.      Three Minute Recall (leader, season, table) 3 /3    No need to look at the clock since she recalled all three words.  David clock face with all 12 numbers and set the hands to show 10 past 11.       Cognitive concerns identified deferred for follow up unless specifically addressed in assessment and plan.      Fall Risk Assessment      Has the patient had two or more falls in the last year or any fall with injury in the last year? Yes, in bathtub and it was due to slipping. I educated her to use safety adhesives in the bathtub. No injuries.         Safety Assessment    Throw rugs on floor.  no   Handrails on all stairs.  yes   Good lighting in all hallways.  yes   Difficulty hearing.  no   Patient counseled about all safety risks that were identified.      Functional Assessment ADLs    Are there any barriers preventing you from cooking for yourself or meeting nutritional needs?  No   Are there any barriers preventing you from driving safely or obtaining transportation? No  .    Are there any barriers preventing you from using a telephone or calling for help? No  .    Are there any barriers preventing you from shopping? No  .    Are there any barriers preventing you from taking care of your own finances? No   .    Are there any barriers preventing you from managing your medications? No  .    Are there any barriers preventing you from showering, bathing or dressing yourself? No .    Are you currently engaging in any exercise or physical activity? 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     What is your perception of your health? Healthy/fair  .          Health Maintenance Summary                Annual Wellness Visit  Overdue 1950     IMM ZOSTER VACCINES Overdue 9/24/1998     IMM INFLUENZA Postponed 4/23/2019 Originally 9/1/2018. Patient Refused    MAMMOGRAM Next Due 10/9/2019      Done 10/9/2018 MA-SCREENING MAMMO BILAT W/TOMOSYNTHESIS W/CAD     Patient has more history with this topic...    PAP SMEAR Next Due 1/8/2021      Done 1/8/2018 PATHOLOGY GYN SPECIMEN     Patient has more history with this topic...    BONE DENSITY Next Due 5/26/2021      Done 5/26/2016 DS-BONE DENSITY STUDY (DEXA)     Patient has more history with this topic...    COLONOSCOPY Next Due 3/4/2025      Done 3/4/2015      Patient has more history with this topic...    IMM DTaP/Tdap/Td Vaccine Next Due 7/28/2027      Done 7/28/2017 Imm Admin: Tdap Vaccine          Patient Care Team:  Prabha Casas M.D. as PCP - General (Internal Medicine)  Demetris Avery M.D. as Consulting Physician (Cardiology)  Pantera Wylie M.D. as Consulting Physician (Gynecologic Oncology)  Nico De Jesus M.D. as Consulting Physician (Ophthalmology)      Social History   Substance Use Topics   • Smoking status: Never Smoker   • Smokeless tobacco: Never Used   • Alcohol use No     Family History   Problem Relation Age of Onset   • Hyperlipidemia Mother    • Hypertension Mother    • Cancer Mother    • No Known Problems Father    • Heart Disease Sister    • Alcohol/Drug Sister    • Hypertension Sister    • Heart Attack Sister    • Cancer Sister 66        ? breast cancer as well   • Hypertension Sister    • Hyperlipidemia Sister    • Cancer Unknown    • Hypertension Unknown    • Diabetes Maternal Grandmother         elderly   • Other Sister         suicide    • Stroke Neg Hx      She  has a past medical history of Allergic rhinitis; Anesthesia; Atrial fibrillation (HCC); Cancer (HCC) (2014); CATARACT; Cholesterol blood decreased; Dental disorder; Dermatitis, contact; Dyslipidemia (6/30/2016); Endometrium cancer (HCC); Hyperlipidemia; Hypertension; Impaired glucose tolerance;  "Macular degeneration; Obesity; Pneumonia; Prolapsed uterus (8/2014); Stroke (HCC); TIA (transient ischemic attack); Unspecified hemorrhagic conditions; and Vitamin D deficiency.   Past Surgical History:   Procedure Laterality Date   • HYSTERECTOMY ROBOTIC XI  8/28/2014    Performed by Pantera Wylie M.D. at SURGERY [facility]   • LYMPH NODE DISSECTION ROBOTIC XI  8/28/2014    Performed by Pantera Wylie M.D. at SURGERY [facility]   • CATARACT PHACO WITH IOL  8/19/2010    Performed by DAVE AGUILAR at SURGERY SAME DAY [facility]   • CATARACT PHACO WITH IOL  8/5/2010    Performed by DAVE AGUILAR at SURGERY SAME DAY [facility]   • HIP ARTHROSCOPY  5/12/08    Performed by ZINA HOLLIS at SURGERY [facility]   • ACETABULAR OSTEOTOMY  5/12/08    Performed by ZINA HOLLIS at [facility]   • ORTHOPEDIC OSTEOTOMY  5/12/08    Performed by ZINA HOLLIS at [facility]   • NASAL POLYPECTOMY  1994    \"removal of papilloma\"   • ABDOMINAL HYSTERECTOMY TOTAL     • EYE SURGERY         Exam:   Blood pressure 119/77, pulse 75, temperature 36.8 °C (98.2 °F), temperature source Temporal, resp. rate 16, height 1.6 m (5' 3\"), weight 83 kg (183 lb), SpO2 97 %, not currently breastfeeding. Body mass index is 32.42 kg/m².    Hearing good.    Dentition good  Alert, oriented in no acute distress.  Eye contact is good, speech goal directed, affect calm    Assessment and Plan. The following treatment and monitoring plan is recommended:    Advised to add safety features in bathtub.    HTN  Her Bp is good in clinic today, she states that she has 2 BP monitoring devices at home and they do not match.I advised her to bring them to clinic to have them calibrated. Continue healthy diet and exeresis.continue metoprolol and lisinopril.    A.fib   On pradaxa and metoprolol, continue.    Dyslipidmeia  On Lovastatin. Last LDL was 100 which is in good range.      Services suggested: No services " needed at this time  Health Care Screening: Age-appropriate preventive services recommended by USPTF and ACIP covered by Medicare were discussed today. Services ordered if indicated and agreed upon by the patient.  Referrals offered: Community-based lifestyle interventions to reduce health risks and promote self-management and wellness, fall prevention, nutrition, physical activity, tobacco-use cessation, weight loss, and mental health services as per orders if indicated.    Discussion today about general wellness and lifestyle habits:    · Prevent falls and reduce trip hazards; Cautioned about securing or removing rugs.  · Have a working fire alarm and carbon monoxide detector;   · Engage in regular physical activity and social activities     Follow-up:  3 months.

## 2018-11-29 DIAGNOSIS — I10 ESSENTIAL HYPERTENSION: ICD-10-CM

## 2018-11-29 DIAGNOSIS — I48.91 ATRIAL FIBRILLATION, UNSPECIFIED TYPE (HCC): ICD-10-CM

## 2018-11-29 RX ORDER — DABIGATRAN ETEXILATE 150 MG/1
150 CAPSULE ORAL 2 TIMES DAILY
Qty: 180 CAP | Refills: 3 | Status: SHIPPED | OUTPATIENT
Start: 2018-11-29 | End: 2018-12-14 | Stop reason: SDUPTHER

## 2018-12-14 ENCOUNTER — OFFICE VISIT (OUTPATIENT)
Dept: CARDIOLOGY | Facility: MEDICAL CENTER | Age: 68
End: 2018-12-14
Payer: MEDICARE

## 2018-12-14 VITALS
SYSTOLIC BLOOD PRESSURE: 122 MMHG | WEIGHT: 184.41 LBS | OXYGEN SATURATION: 96 % | HEART RATE: 70 BPM | DIASTOLIC BLOOD PRESSURE: 78 MMHG | HEIGHT: 63 IN | BODY MASS INDEX: 32.68 KG/M2

## 2018-12-14 DIAGNOSIS — I10 ESSENTIAL HYPERTENSION: ICD-10-CM

## 2018-12-14 DIAGNOSIS — G45.9 TIA (TRANSIENT ISCHEMIC ATTACK): ICD-10-CM

## 2018-12-14 DIAGNOSIS — I48.91 ATRIAL FIBRILLATION, UNSPECIFIED TYPE (HCC): ICD-10-CM

## 2018-12-14 DIAGNOSIS — E78.5 DYSLIPIDEMIA: ICD-10-CM

## 2018-12-14 DIAGNOSIS — I65.23 BILATERAL CAROTID ARTERY STENOSIS: ICD-10-CM

## 2018-12-14 DIAGNOSIS — Z79.01 CHRONIC ANTICOAGULATION: ICD-10-CM

## 2018-12-14 PROBLEM — I65.29 CAROTID ARTERY STENOSIS: Status: ACTIVE | Noted: 2018-12-14

## 2018-12-14 PROCEDURE — 99214 OFFICE O/P EST MOD 30 MIN: CPT | Performed by: INTERNAL MEDICINE

## 2018-12-14 RX ORDER — DABIGATRAN ETEXILATE 150 MG/1
150 CAPSULE ORAL 2 TIMES DAILY
Qty: 60 CAP | Refills: 0 | Status: SHIPPED | OUTPATIENT
Start: 2018-12-14 | End: 2019-10-07 | Stop reason: SDUPTHER

## 2018-12-14 ASSESSMENT — ENCOUNTER SYMPTOMS
WEAKNESS: 0
BRUISES/BLEEDS EASILY: 0
VOMITING: 0
CONSTITUTIONAL NEGATIVE: 1
HEADACHES: 0
MYALGIAS: 0
NEUROLOGICAL NEGATIVE: 1
DIZZINESS: 0
CLAUDICATION: 0
FEVER: 0
BLURRED VISION: 0
NAUSEA: 0
CHILLS: 0
ABDOMINAL PAIN: 0
GASTROINTESTINAL NEGATIVE: 1
PALPITATIONS: 1
EYES NEGATIVE: 1
DEPRESSION: 0
COUGH: 0
NERVOUS/ANXIOUS: 0
FOCAL WEAKNESS: 0
DOUBLE VISION: 0
SHORTNESS OF BREATH: 0
PSYCHIATRIC NEGATIVE: 1
WEIGHT LOSS: 0
RESPIRATORY NEGATIVE: 1

## 2018-12-14 NOTE — LETTER
"     [facility]   [address] [place] [address]  [place] [address]  Phone: [phone]  Fax: [phone]              Cristela Alan  1950    Encounter Date: 12/14/2018    Demetris Avery M.D.          PROGRESS NOTE:  Chief Complaint   Patient presents with   • Atrial Fibrillation     dx: PAF       Subjective:   Cristela Alan is a 68 y.o. female who presents today for annual follow up for atrial fibrillation on chronic anticoagulation therapy and study result review.    Since the patient's last visit on 12/29/17, she has been doing well clinically. She denies chest pain, shortness of breath, palpitations, nausea/vomiting or diaphoresis. She is back to work at school district.    Past Medical History:   Diagnosis Date   • Allergic rhinitis    • Anesthesia     hypotension   • Atrial fibrillation (HCC)    • Cancer (HCC) 2014    endometrial   • CATARACT     bilateral lens implants   • Cholesterol blood decreased    • Dental disorder     dentures   • Dermatitis, contact    • Dyslipidemia 6/30/2016   • Endometrium cancer (HCC)    • Hyperlipidemia    • Hypertension    • Impaired glucose tolerance    • Macular degeneration    • Obesity    • Pneumonia     had PNU 35 years ago   • Prolapsed uterus 8/2014    current issue   • Stroke (HCC)     \"mini strokes\" TIA, no residual   • TIA (transient ischemic attack)    • Unspecified hemorrhagic conditions     NOSE BLEEDS/on Plavix   • Vitamin D deficiency      Past Surgical History:   Procedure Laterality Date   • HYSTERECTOMY ROBOTIC XI  8/28/2014    Performed by Pantera Wylie M.D. at SURGERY [facility]S   • LYMPH NODE DISSECTION ROBOTIC XI  8/28/2014    Performed by Pantera Wylie M.D. at SURGERY [facility]S   • CATARACT PHACO WITH IOL  8/19/2010    Performed by DAVE AGUILAR at SURGERY SAME DAY [facility]   • CATARACT PHACO WITH IOL  8/5/2010    Performed by DAVE AGUILAR at SURGERY SAME DAY [facility]   • HIP ARTHROSCOPY  " "5/12/08    Performed by ZINA HOLLIS at SURGERY [facility]S   • ACETABULAR OSTEOTOMY  5/12/08    Performed by ZINA HOLLIS at SURGERY [facility]   • ORTHOPEDIC OSTEOTOMY  5/12/08    Performed by ZINA HOLLIS at SURGERY [facility]   • NASAL POLYPECTOMY  1994    \"removal of papilloma\"   • ABDOMINAL HYSTERECTOMY TOTAL     • EYE SURGERY       Family History   Problem Relation Age of Onset   • Hyperlipidemia Mother    • Hypertension Mother    • Cancer Mother    • No Known Problems Father    • Heart Disease Sister    • Alcohol/Drug Sister    • Hypertension Sister    • Heart Attack Sister    • Cancer Sister 66        ? breast cancer as well   • Hypertension Sister    • Hyperlipidemia Sister    • Cancer Unknown    • Hypertension Unknown    • Diabetes Maternal Grandmother         elderly   • Other Sister         suicide    • Stroke Neg Hx      Social History     Social History   • Marital status:      Spouse name: N/A   • Number of children: N/A   • Years of education: N/A     Occupational History   • Not on file.     Social History Main Topics   • Smoking status: Never Smoker   • Smokeless tobacco: Never Used   • Alcohol use No   • Drug use: No   • Sexual activity: Not Currently     Other Topics Concern   • Not on file     Social History Narrative   • No narrative on file     Allergies   Allergen Reactions   • Fentanyl Anaphylaxis   • Versed Shortness of Breath and Vomiting   • Bee      And  spider--- tongue swelling   • Codeine      Increased heart rate   • Eggs Vomiting   • Flu Virus Vaccine    • Lidocaine Hcl      \"severe drop in BP\"   • Other Drug      Novocaine hypotension ALL \"MELISA\"   • Penicillins      Increased heart rate   • Soap Rash     Laundry Tide soap, Dove and Ivory soap   • Tape Rash     Paper tape OK   • Xylocaine [Lidocaine Hcl, Local Anesth.]      All \"MELISA\" hypotension     Outpatient Encounter Prescriptions as of 12/14/2018   Medication Sig Dispense Refill   • " dabigatran (PRADAXA) 150 MG Cap capsule Take 1 Cap by mouth 2 Times a Day. TWICE DAILY 180 Cap 3   • metoprolol (LOPRESSOR) 25 MG Tab TAKE ONE TABLET BY MOUTH TWICE DAILY 180 Tab 3   • lisinopril (PRINIVIL) 5 MG Tab Take 1 Tab by mouth every day. 90 Tab 3   • lovastatin (MEVACOR) 40 MG tablet Take 1 Tab by mouth every day. 90 Tab 0   • sulfamethoxazole-trimethoprim (BACTRIM) 400-80 MG Tab Take 1 Tab by mouth 2 times a day. (Patient not taking: Reported on 10/29/2018) 10 Tab 0   • Multiple Vitamins-Minerals (PRESERVISION AREDS PO) Take  by mouth.     • Cholecalciferol (VITAMIN D HIGH POTENCY PO) Take 1,400 Units by mouth every day.     • Polyethylene Glycol 400 (BLINK TEARS OP) by Ophthalmic route every day.     • Calcium 600 MG TABS Take 1 Tab by mouth every morning.       • multivitamin (THERAGRAN) TABS Take 1 Tab by mouth every morning.       • Polyethyl Glycol-Propyl Glycol (SYSTANE OP) Place 1 Drop in both eyes every bedtime. Indications: **OTC**       No facility-administered encounter medications on file as of 12/14/2018.      Review of Systems   Constitutional: Negative.  Negative for chills, fever, malaise/fatigue and weight loss.   HENT: Negative.  Negative for hearing loss.    Eyes: Negative.  Negative for blurred vision and double vision.   Respiratory: Negative.  Negative for cough and shortness of breath.    Cardiovascular: Negative.  Negative for chest pain, palpitations, claudication and leg swelling.   Gastrointestinal: Negative.  Negative for abdominal pain, nausea and vomiting.   Genitourinary: Negative.  Negative for dysuria and urgency.   Musculoskeletal: Negative.  Negative for joint pain and myalgias.   Skin: Negative.  Negative for itching and rash.   Neurological: Negative.  Negative for dizziness, focal weakness, weakness and headaches.   Endo/Heme/Allergies: Negative.  Does not bruise/bleed easily.   Psychiatric/Behavioral: Negative.  Negative for depression. The patient is not  "nervous/anxious.         Objective:   /78 (BP Location: Right arm, Patient Position: Sitting, BP Cuff Size: Adult)   Pulse 70   Ht 1.6 m (5' 3\")   Wt 83.7 kg (184 lb 6.6 oz)   SpO2 96%   BMI 32.67 kg/m²      Physical Exam   Constitutional: She is oriented to person, place, and time. She appears well-developed and well-nourished.   HENT:   Head: Normocephalic and atraumatic.   Eyes: Pupils are equal, round, and reactive to light. Conjunctivae are normal.   Neck: Normal range of motion. Neck supple.   Cardiovascular: Normal rate and regular rhythm.    Pulmonary/Chest: Effort normal and breath sounds normal.   Abdominal: Soft. Bowel sounds are normal.   Musculoskeletal: Normal range of motion.   Neurological: She is alert and oriented to person, place, and time.   Skin: Skin is warm and dry.   Psychiatric: She has a normal mood and affect.     CARDIAC STUDIES/PROCEDURES:    CAROTID ULTRASOUND (06/18/18)  Mild bilateral internal carotid artery stenosis (<50%).  (study result reviewed)    ECHOCARDIOGRAM CONCLUSIONS (06/18/18)  Compared to the images of the prior study.   Normal left ventricular systolic function.  Left ventricular ejection fraction is visually estimated to be 75%.  Grade II diastolic dysfunction.  No significant valve abnormalities.   (study result reviewed)     ECHOCARDIOGRAM CONCLUSIONS (12/05/12)  Technically difficult study  Normal left ventricular size, thickness, systolic function, and diastolic function.  Left ventricular ejection fraction is 55% to 60%.  Structurally normal mitral valve.  No stenosis or regurgitation seen.  Structurally normal aortic valve.  No stenosis or regurgitation seen.  Structurally normal tricuspid valve.  Trace tricuspid regurgitation.     EKG performed on (08/24/14) EKG shows normal sinus rhythm.  EKG performed on (07/30/14) EKG shows normal sinus rhythm.  EKG performed on (12/06/12) EKG shows normal sinus rhythm.  EKG performed on (12/05/12) EKG shows atrial " fibrillation.     Laboratory results of (06/12/17) Cholesterol profile of 169/137/54/88 noted.  Laboratory results of (09/14/16) Cholesterol profile of 151/103/55/75 noted.  Laboratory results of (06/01/15) Cholesterol profile of 190/150/54/106 noted.    MYOCARDIAL PERFUSION STUDY CONCLUSIONS (06/18/18)  No evidence of significant jeopardized viable myocardium or prior myocardial infarction.  Normal left ventricular size, ejection fraction, and wall motion.  ECG INTERPRETATION  Negative stress ECG for ischemia.  (study result reviewed)     MPI CONCLUSIONS (06/07/06)  NORMAL MYOCARDIAL PERFUSION IMAGES WITH NO EVIDENCE OF SIGNIFICANT   ISCHEMIA OR INFARCTION.    Assessment:     1. Atrial fibrillation, unspecified type (HCC)     2. Chronic anticoagulation     3. Essential hypertension     4. Dyslipidemia     5. Bilateral carotid artery stenosis     6. TIA (transient ischemic attack)         Medical Decision Making:  Today's Assessment / Status / Plan:     1. Atrial fibrillation on anticoagulation therapy (Pradaxa): She is doing well on anticoagulation and the ventricular rate is well controlled.  2. Hypertension: Blood pressure is well controlled.  3. Hyperlipidemia (managed by Dr. Ayoub): She is doing well on statin therapy without myalgia symptoms.  4. Carotid artery stenosis: Clinically stable on medical therapy.  5. History of trans-ischemic attack: She remains clinically stable without any new neurological symptoms. .We will perform a carotid ultrasound.  6. Health maintenance: She is under surveillance for pulmonary nodules. She underwent surgery for uterine cancer (08/27/14).     We will follow up the patient in one year with echocardiogram, myocardial perfusion imaging study and carotid ultrasound.     CC Dr. Ayoub, Nancy            No Recipients

## 2018-12-14 NOTE — PROGRESS NOTES
"Chief Complaint   Patient presents with   • Atrial Fibrillation     dx: PAF       Subjective:   Cristela Alan is a 68 y.o. female who presents today for annual follow up for atrial fibrillation on chronic anticoagulation therapy and study result review.    Since the patient's last visit on 12/29/17, she has been doing well clinically. She continues to experience occasional palpitations. She denies chest pain, shortness of breath, nausea/vomiting or diaphoresis. 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    Past Medical History:   Diagnosis Date   • Allergic rhinitis    • Anesthesia     hypotension   • Atrial fibrillation (HCC)    • Cancer (HCC) 2014    endometrial   • CATARACT     bilateral lens implants   • Cholesterol blood decreased    • Dental disorder     dentures   • Dermatitis, contact    • Dyslipidemia 6/30/2016   • Endometrium cancer (HCC)    • Hyperlipidemia    • Hypertension    • Impaired glucose tolerance    • Macular degeneration    • Obesity    • Pneumonia     had PNU 35 years ago   • Prolapsed uterus 8/2014    current issue   • Stroke (HCC)     \"mini strokes\" TIA, no residual   • TIA (transient ischemic attack)    • Unspecified hemorrhagic conditions     NOSE BLEEDS/on Plavix   • Vitamin D deficiency      Past Surgical History:   Procedure Laterality Date   • HYSTERECTOMY ROBOTIC XI  8/28/2014    Performed by Pantera Wylie M.D. at SURGERY [facility]   • LYMPH NODE DISSECTION ROBOTIC XI  8/28/2014    Performed by Pantera Wylie M.D. at SURGERY [facility]   • CATARACT PHACO WITH IOL  8/19/2010    Performed by DAVE AGUILAR at SURGERY SAME DAY [facility]   • CATARACT PHACO WITH IOL  8/5/2010    Performed by DAVE AGUILAR at SURGERY SAME DAY [facility]   • HIP ARTHROSCOPY  5/12/08    Performed by ZINA HOLLIS at [facility]   • ACETABULAR OSTEOTOMY  5/12/08    Performed by ZINA HOLLIS at [facility]" [facility] ORS   • ORTHOPEDIC OSTEOTOMY  5/12/08    Performed by ZINA HOLLIS at SURGERY [facility] ORS   • NASAL POLYPECTOMY  1994    \"removal of papilloma\"   • ABDOMINAL HYSTERECTOMY TOTAL     • EYE SURGERY       Family History   Problem Relation Age of Onset   • Hyperlipidemia Mother    • Hypertension Mother    • Cancer Mother    • No Known Problems Father    • Heart Disease Sister    • Alcohol/Drug Sister    • Hypertension Sister    • Heart Attack Sister    • Cancer Sister 66        ? breast cancer as well   • Hypertension Sister    • Hyperlipidemia Sister    • Cancer Unknown    • Hypertension Unknown    • Diabetes Maternal Grandmother         elderly   • Other Sister         suicide    • Stroke Neg Hx      Social History     Social History   • Marital status:      Spouse name: N/A   • Number of children: N/A   • Years of education: N/A     Occupational History   • Not on file.     Social History Main Topics   • Smoking status: Never Smoker   • Smokeless tobacco: Never Used   • Alcohol use No   • Drug use: No   • Sexual activity: Not Currently     Other Topics Concern   • Not on file     Social History Narrative   • No narrative on file     Allergies   Allergen Reactions   • Fentanyl Anaphylaxis   • Versed Shortness of Breath and Vomiting   • Bee      And  spider--- tongue swelling   • Codeine      Increased heart rate   • Eggs Vomiting   • Flu Virus Vaccine    • Lidocaine Hcl      \"severe drop in BP\"   • Other Drug      Novocaine hypotension ALL \"MELISA\"   • Penicillins      Increased heart rate   • Soap Rash     Laundry Tide soap, Dove and Ivory soap   • Tape Rash     Paper tape OK   • Xylocaine [Lidocaine Hcl, Local Anesth.]      All \"MELISA\" hypotension     Medications reviewed.    Outpatient Encounter Prescriptions as of 12/14/2018   Medication Sig Dispense Refill   • dabigatran (PRADAXA) 150 MG Cap capsule Take 1 Cap by mouth 2 Times a Day. TWICE DAILY 180 Cap 3   • metoprolol (LOPRESSOR) 25 MG " Tab TAKE ONE TABLET BY MOUTH TWICE DAILY 180 Tab 3   • lisinopril (PRINIVIL) 5 MG Tab Take 1 Tab by mouth every day. 90 Tab 3   • lovastatin (MEVACOR) 40 MG tablet Take 1 Tab by mouth every day. 90 Tab 0   • Multiple Vitamins-Minerals (PRESERVISION AREDS PO) Take  by mouth.     • Cholecalciferol (VITAMIN D HIGH POTENCY PO) Take 800 mg by mouth every day.     • Polyethylene Glycol 400 (BLINK TEARS OP) by Ophthalmic route every day.     • Calcium 600 MG TABS Take 1 Tab by mouth every morning.     • multivitamin (THERAGRAN) TABS Take 1 Tab by mouth every morning.       • Polyethyl Glycol-Propyl Glycol (SYSTANE OP) Place 1 Drop in both eyes every bedtime. Indications: **OTC**     • [DISCONTINUED] sulfamethoxazole-trimethoprim (BACTRIM) 400-80 MG Tab Take 1 Tab by mouth 2 times a day. (Patient not taking: Reported on 10/29/2018) 10 Tab 0     No facility-administered encounter medications on file as of 12/14/2018.      Review of Systems   Constitutional: Negative.  Negative for chills, fever, malaise/fatigue and weight loss.   HENT: Negative.  Negative for hearing loss.    Eyes: Negative.  Negative for blurred vision and double vision.   Respiratory: Negative.  Negative for cough and shortness of breath.    Cardiovascular: Positive for palpitations. Negative for chest pain, claudication and leg swelling.   Gastrointestinal: Negative.  Negative for abdominal pain, nausea and vomiting.   Genitourinary: Negative.  Negative for dysuria and urgency.   Musculoskeletal: Positive for joint pain. Negative for myalgias.   Skin: Negative.  Negative for itching and rash.   Neurological: Negative.  Negative for dizziness, focal weakness, weakness and headaches.   Endo/Heme/Allergies: Negative.  Does not bruise/bleed easily.   Psychiatric/Behavioral: Negative.  Negative for depression. The patient is not nervous/anxious.         Objective:   /78 (BP Location: Right arm, Patient Position: Sitting, BP Cuff Size: Adult)   Pulse 70   " Ht 1.6 m (5' 3\")   Wt 83.7 kg (184 lb 6.6 oz)   SpO2 96%   BMI 32.67 kg/m²     Physical Exam   Constitutional: She is oriented to person, place, and time. She appears well-developed and well-nourished.   HENT:   Head: Normocephalic and atraumatic.   Eyes: Pupils are equal, round, and reactive to light. Conjunctivae are normal.   Neck: Normal range of motion. Neck supple.   Cardiovascular: Normal rate and regular rhythm.    Pulmonary/Chest: Effort normal and breath sounds normal.   Abdominal: Soft. Bowel sounds are normal.   Musculoskeletal: Normal range of motion.   Neurological: She is alert and oriented to person, place, and time.   Skin: Skin is warm and dry.   Psychiatric: She has a normal mood and affect.     CARDIAC STUDIES/PROCEDURES:    CAROTID ULTRASOUND (06/18/18)  Mild bilateral internal carotid artery stenosis (<50%).  (study result reviewed)    ECHOCARDIOGRAM CONCLUSIONS (06/18/18)  Compared to the images of the prior study.   Normal left ventricular systolic function.  Left ventricular ejection fraction is visually estimated to be 75%.  Grade II diastolic dysfunction.  No significant valve abnormalities.   (study result reviewed)     ECHOCARDIOGRAM CONCLUSIONS (12/05/12)  Technically difficult study  Normal left ventricular size, thickness, systolic function, and diastolic function.  Left ventricular ejection fraction is 55% to 60%.  Structurally normal mitral valve.  No stenosis or regurgitation seen.  Structurally normal aortic valve.  No stenosis or regurgitation seen.  Structurally normal tricuspid valve.  Trace tricuspid regurgitation.      EKG performed on (08/24/14) EKG shows normal sinus rhythm.  EKG performed on (07/30/14) EKG shows normal sinus rhythm.  EKG performed on (12/06/12) EKG shows normal sinus rhythm.  EKG performed on (12/05/12) EKG shows atrial fibrillation.     Laboratory results of (06/12/17) Cholesterol profile of 169/137/54/88 noted.  Laboratory results of (09/14/16) " Cholesterol profile of 151/103/55/75 noted.  Laboratory results of (06/01/15) Cholesterol profile of 190/150/54/106 noted.    MYOCARDIAL PERFUSION STUDY CONCLUSIONS (06/18/18)  No evidence of significant jeopardized viable myocardium or prior myocardial infarction.  Normal left ventricular size, ejection fraction, and wall motion.  ECG INTERPRETATION  Negative stress ECG for ischemia.  (study result reviewed)     MPI CONCLUSIONS (06/07/06)  NORMAL MYOCARDIAL PERFUSION IMAGES WITH NO EVIDENCE OF SIGNIFICANT   ISCHEMIA OR INFARCTION.    Assessment:     1. Atrial fibrillation, unspecified type (HCC)     2. Chronic anticoagulation     3. Essential hypertension     4. Dyslipidemia     5. Bilateral carotid artery stenosis     6. TIA (transient ischemic attack)       Medical Decision Making:  Today's Assessment / Status / Plan:     1. Atrial fibrillation on anticoagulation therapy (Pradaxa): She is doing well on anticoagulation and the ventricular rate is well controlled.  2. Hypertension: Blood pressure is well controlled.  3. Hyperlipidemia (managed by primary care physician): She is doing well on statin therapy without myalgia symptoms.  4. Carotid artery stenosis: Clinically stable on medical therapy.  5. History of trans-ischemic attack: She remains clinically stable without any new neurological symptoms. .We will perform a carotid ultrasound.  6. Health maintenance: She is under surveillance for pulmonary nodules. She underwent surgery for uterine cancer (08/27/14).     We will follow up the patient in one year.     CC Prabha Olivas

## 2018-12-17 DIAGNOSIS — E78.5 DYSLIPIDEMIA: ICD-10-CM

## 2018-12-17 NOTE — TELEPHONE ENCOUNTER
From: Cristela Alan  Sent: 12/17/2018 9:04 AM PST  Subject: Medication Renewal Request    Cristela Alan would like a refill of the following medications:     lovastatin (MEVACOR) 40 MG tablet [Prabha Casas M.D.]   Patient Comment: lisinpril 5mg     Preferred pharmacy: [id number] - [place], [place] - [address]

## 2018-12-17 NOTE — TELEPHONE ENCOUNTER
Last seen: 10/29/18 by Dr. Casas  Next appt: 03/05/19 with Dr. Casas    Was the patient seen in the last year in this department? Yes   Does patient have an active prescription for medications requested? No   Received Request Via: Pharmacy

## 2018-12-18 RX ORDER — LOVASTATIN 40 MG/1
40 TABLET ORAL DAILY
Qty: 90 TAB | Refills: 0 | Status: SHIPPED | OUTPATIENT
Start: 2018-12-18 | End: 2019-05-08 | Stop reason: SDUPTHER

## 2019-01-07 ENCOUNTER — HOSPITAL ENCOUNTER (OUTPATIENT)
Dept: LAB | Facility: MEDICAL CENTER | Age: 69
End: 2019-01-07
Attending: SPECIALIST
Payer: MEDICARE

## 2019-01-07 PROCEDURE — 88175 CYTOPATH C/V AUTO FLUID REDO: CPT

## 2019-01-07 PROCEDURE — 87624 HPV HI-RISK TYP POOLED RSLT: CPT

## 2019-01-09 LAB
CYTOLOGY REG CYTOL: NORMAL
HPV HR 12 DNA CVX QL NAA+PROBE: NEGATIVE
HPV16 DNA SPEC QL NAA+PROBE: NEGATIVE
HPV18 DNA SPEC QL NAA+PROBE: NEGATIVE
SPECIMEN SOURCE: NORMAL

## 2019-03-05 ENCOUNTER — APPOINTMENT (OUTPATIENT)
Dept: INTERNAL MEDICINE | Facility: MEDICAL CENTER | Age: 69
End: 2019-03-05
Payer: MEDICARE

## 2019-03-05 ENCOUNTER — TELEPHONE (OUTPATIENT)
Dept: INTERNAL MEDICINE | Facility: MEDICAL CENTER | Age: 69
End: 2019-03-05

## 2019-03-05 NOTE — TELEPHONE ENCOUNTER
Patient cancelled her today's appointment with me. I called her to discuss her lung nodules that were seen on previous CT scan. She later had a CT-PETCT that showed:   1.  There is no abnormal FDG uptake to suggest metastatic disease.  2.  The right upper lobe parenchymal nodular process is nearly completely resolved with minimal scarring superiorly in area of previously noted nodularity which may have been fluid in the bronchial tree.  3.  Stable central 9 mm structure in the medial right upper lobe which is may be a fluid-filled bronchiole versus less likely a stable lung nodule.  4.  Stable small lingular nodule with nonvisualization of the left lower lobe nodule due to motion.    Patient states that Dr. Santillan is following up on her questionable lung nodules and she has an order for chest xray through his office. She declined that I refer her to pulmonary or do any further imaging.     She also wanted to make sure that her Lisinopril 5 mg and Lovastatin 40 mg are on her chart. I assured her that they are on her chart. She stated that she does not need any refills at this point.

## 2019-03-27 ENCOUNTER — OFFICE VISIT (OUTPATIENT)
Dept: INTERNAL MEDICINE | Facility: MEDICAL CENTER | Age: 69
End: 2019-03-27
Payer: MEDICARE

## 2019-03-27 VITALS
BODY MASS INDEX: 32.96 KG/M2 | DIASTOLIC BLOOD PRESSURE: 76 MMHG | WEIGHT: 186 LBS | OXYGEN SATURATION: 95 % | HEIGHT: 63 IN | SYSTOLIC BLOOD PRESSURE: 118 MMHG | HEART RATE: 76 BPM | TEMPERATURE: 97.8 F

## 2019-03-27 DIAGNOSIS — G45.9 TIA (TRANSIENT ISCHEMIC ATTACK): ICD-10-CM

## 2019-03-27 DIAGNOSIS — E78.5 DYSLIPIDEMIA: ICD-10-CM

## 2019-03-27 DIAGNOSIS — I10 ESSENTIAL HYPERTENSION: ICD-10-CM

## 2019-03-27 DIAGNOSIS — R73.03 PREDIABETES: ICD-10-CM

## 2019-03-27 DIAGNOSIS — I48.91 ATRIAL FIBRILLATION, UNSPECIFIED TYPE (HCC): ICD-10-CM

## 2019-03-27 DIAGNOSIS — C54.9 MALIGNANT NEOPLASM OF CORPUS UTERI, EXCEPT ISTHMUS (HCC): ICD-10-CM

## 2019-03-27 DIAGNOSIS — H35.30 MACULAR DEGENERATION, UNSPECIFIED LATERALITY, UNSPECIFIED TYPE: ICD-10-CM

## 2019-03-27 DIAGNOSIS — R91.1 PULMONARY NODULE: ICD-10-CM

## 2019-03-27 DIAGNOSIS — Z79.01 CHRONIC ANTICOAGULATION: ICD-10-CM

## 2019-03-27 PROCEDURE — 99214 OFFICE O/P EST MOD 30 MIN: CPT | Performed by: INTERNAL MEDICINE

## 2019-03-27 ASSESSMENT — PAIN SCALES - GENERAL: PAINLEVEL: NO PAIN

## 2019-03-27 ASSESSMENT — PATIENT HEALTH QUESTIONNAIRE - PHQ9: CLINICAL INTERPRETATION OF PHQ2 SCORE: 0

## 2019-03-28 NOTE — PROGRESS NOTES
Established Patient    Cristela presents today with the following:    CC: follow up on chronic medical probelms    HPI:  68 year old lady is here for follow up on her chronic medical problems.  A.Fib on metoprolol and pradaxa. No palpitation, no dizziness, no chest pain.  HTN under good control, on metoprolol and lisinopril  Dyslipidemia on Lovastatin, last LDL was 100.    Patient Active Problem List    Diagnosis Date Noted   • Atrial fibrillation (HCC) 12/06/2012     Priority: High   • Chronic anticoagulation 12/06/2012     Priority: High   • Essential hypertension 12/06/2012     Priority: Medium   • Dyslipidemia 12/06/2012     Priority: Medium   • TIA (transient ischemic attack) 12/06/2012     Priority: Low   • Carotid artery stenosis 12/14/2018   • Pulmonary nodule 11/09/2016   • Macular degeneration 05/10/2016   • Impaired fasting glucose 05/10/2016   • Vitamin D deficiency 05/10/2016   • Obesity (BMI 30-39.9) 05/10/2016   • Hernia of abdominal wall 05/10/2016   • Family history of cancer 05/10/2016   • Health care maintenance 05/10/2016   • Malignant neoplasm of corpus uteri, except isthmus (CMS-HCC) 08/28/2014       Current Outpatient Prescriptions   Medication Sig Dispense Refill   • lovastatin (MEVACOR) 40 MG tablet Take 1 Tab by mouth every day. 90 Tab 0   • dabigatran (PRADAXA) 150 MG Cap capsule Take 1 Cap by mouth 2 Times a Day. TWICE DAILY 60 Cap 0   • metoprolol (LOPRESSOR) 25 MG Tab TAKE ONE TABLET BY MOUTH TWICE DAILY 180 Tab 3   • lisinopril (PRINIVIL) 5 MG Tab Take 1 Tab by mouth every day. 90 Tab 3   • Multiple Vitamins-Minerals (PRESERVISION AREDS PO) Take  by mouth.     • Cholecalciferol (VITAMIN D HIGH POTENCY PO) Take 800 mg by mouth every day.     • Polyethylene Glycol 400 (BLINK TEARS OP) by Ophthalmic route every day.     • Calcium 600 MG TABS Take 1 Tab by mouth every morning.     • multivitamin (THERAGRAN) TABS Take 1 Tab by mouth every morning.       • Polyethyl Glycol-Propyl Glycol  "(SYSTANE OP) Place 1 Drop in both eyes every bedtime. Indications: **OTC**       No current facility-administered medications for this visit.        ROS: As per HPI. All others reviewed and were negative.        /76 (BP Location: Right arm, Patient Position: Sitting, BP Cuff Size: Adult)   Pulse 76   Temp 36.6 °C (97.8 °F) (Temporal)   Ht 1.6 m (5' 3\")   Wt 84.4 kg (186 lb)   SpO2 95%   Breastfeeding? No   BMI 32.95 kg/m²     Physical Exam   Constitutional:  oriented to person, place, and time. No distress.   Eyes: Pupils are equal, round, and reactive to light. No scleral icterus.  Neck: Neck supple. No thyromegaly present.   Cardiovascular: Normal rate, regular rhythm and normal heart sounds.  Exam reveals no gallop and no friction rub.  No murmur heard.  Pulmonary/Chest: Breath sounds normal. Chest wall is not dull to percussion.   Musculoskeletal:   no edema.   Lymphadenopathy: no cervical adenopathy  Neurological: alert and oriented to person, place, and time.   Skin: No cyanosis. Nails show no clubbing.      Results for ALVARO ARTEAGA (MRN [id number]) as of 3/27/2019 20:08   Ref. Range 7/19/2018 08:53   WBC Latest Ref Range: 4.8 - 10.8 K/uL 4.9   RBC Latest Ref Range: 4.20 - 5.40 M/uL 4.67   Hemoglobin Latest Ref Range: 12.0 - 16.0 g/dL 14.1   Hematocrit Latest Ref Range: 37.0 - 47.0 % 43.6   MCV Latest Ref Range: 81.4 - 97.8 fL 93.4   MCH Latest Ref Range: 27.0 - 33.0 pg 30.2   MCHC Latest Ref Range: 33.6 - 35.0 g/dL 32.3 (L)   RDW Latest Ref Range: 35.9 - 50.0 fL 46.1   Platelet Count Latest Ref Range: 164 - 446 K/uL 284   MPV Latest Ref Range: 9.0 - 12.9 fL 9.7   Neutrophils-Polys Latest Ref Range: 44.00 - 72.00 % 62.70   Neutrophils (Absolute) Latest Ref Range: 2.00 - 7.15 K/uL 3.04   Lymphocytes Latest Ref Range: 22.00 - 41.00 % 22.90   Lymphs (Absolute) Latest Ref Range: 1.00 - 4.80 K/uL 1.11   Monocytes Latest Ref Range: 0.00 - 13.40 % 10.30   Monos (Absolute) Latest Ref Range: 0.00 - 0.85 " K/uL 0.50   Eosinophils Latest Ref Range: 0.00 - 6.90 % 2.10   Eos (Absolute) Latest Ref Range: 0.00 - 0.51 K/uL 0.10   Basophils Latest Ref Range: 0.00 - 1.80 % 1.60   Baso (Absolute) Latest Ref Range: 0.00 - 0.12 K/uL 0.08   Immature Granulocytes Latest Ref Range: 0.00 - 0.90 % 0.40   Immature Granulocytes (abs) Latest Ref Range: 0.00 - 0.11 K/uL 0.02   Nucleated RBC Latest Units: /100 WBC 0.00   NRBC (Absolute) Latest Units: K/uL 0.00     Results for ALVARO ARTEAGA (MRN [id number]) as of 3/27/2019 20:08   Ref. Range 7/19/2018 08:56   Sodium Latest Ref Range: 135 - 145 mmol/L 141   Potassium Latest Ref Range: 3.6 - 5.5 mmol/L 4.6   Chloride Latest Ref Range: 96 - 112 mmol/L 107   Co2 Latest Ref Range: 20 - 33 mmol/L 29   Anion Gap Latest Ref Range: 0.0 - 11.9  5.0   Glucose Latest Ref Range: 65 - 99 mg/dL 113 (H)   Bun Latest Ref Range: 8 - 22 mg/dL 15   Creatinine Latest Ref Range: 0.50 - 1.40 mg/dL 0.93   GFR If  Latest Ref Range: >60 mL/min/1.73 m 2 >60   GFR If Non  Latest Ref Range: >60 mL/min/1.73 m 2 60   Calcium Latest Ref Range: 8.5 - 10.5 mg/dL 9.3   AST(SGOT) Latest Ref Range: 12 - 45 U/L 16   ALT(SGPT) Latest Ref Range: 2 - 50 U/L 13   Alkaline Phosphatase Latest Ref Range: 30 - 99 U/L 73   Total Bilirubin Latest Ref Range: 0.1 - 1.5 mg/dL 0.8   Albumin Latest Ref Range: 3.2 - 4.9 g/dL 4.1   Total Protein Latest Ref Range: 6.0 - 8.2 g/dL 6.9   Globulin Latest Ref Range: 1.9 - 3.5 g/dL 2.8   A-G Ratio Latest Units: g/dL 1.5   Glycohemoglobin Latest Ref Range: 0.0 - 5.6 % 6.1 (H)   Estim. Avg Glu Latest Units: mg/dL 128   Cholesterol,Tot Latest Ref Range: 100 - 199 mg/dL 178   Triglycerides Latest Ref Range: 0 - 149 mg/dL 126   HDL Latest Ref Range: >=40 mg/dL 53   LDL Latest Ref Range: <100 mg/dL 100 (H)       Note: I have reviewed all pertinent labs and diagnostic tests associated with this visit with specific comments listed under the assessment and plan  below    Assessment and Plan    1. Atrial fibrillation, unspecified type (HCC  2. Chronic anticoagulation  3. TIA (transient ischemic attack)  On Pradaxa and Metoprolol, rate under good control no symptoms.countinue same management.    4. Essential hypertension  118/76, Under good control, Continue metoprolol and Lisinopril,    5. Dyslipidemia  She is on lovastatin 40 mg daily. Lipid panel ordered today for follow up.    6. Malignant neoplasm of corpus uteri, except isthmus (CMS-HCC)  Under care of GYN. She will be 5 years cancer free.    7. Pulmonary nodule  Follow up with . She has an order for cxray. She declines to have any further CT scans.    9. Prediabetes  Last A1c was 6.1 on 7/19/18. I reordered A1c.      Followup: Return in about 6 months (around 9/27/2019).      Signed by: Prabha Casas

## 2019-05-08 DIAGNOSIS — E78.5 DYSLIPIDEMIA: ICD-10-CM

## 2019-05-08 NOTE — TELEPHONE ENCOUNTER
Last seen: 03/27/19 by Dr. Casas  Next appt: None - Insurance requests a quantity of 100    Was the patient seen in the last year in this department? Yes   Does patient have an active prescription for medications requested? No   Received Request Via: Pharmacy

## 2019-05-17 RX ORDER — LOVASTATIN 40 MG/1
40 TABLET ORAL DAILY
Qty: 100 TAB | Refills: 0 | Status: SHIPPED | OUTPATIENT
Start: 2019-05-17 | End: 2019-10-23 | Stop reason: SDUPTHER

## 2019-05-17 NOTE — TELEPHONE ENCOUNTER
I will renew her Lovastatin. However her lab work (lipid panel) is still pending. I will also CMP. I will ask our MAs to please advise her to complete his lab works.

## 2019-05-30 ENCOUNTER — HOSPITAL ENCOUNTER (OUTPATIENT)
Dept: LAB | Facility: MEDICAL CENTER | Age: 69
End: 2019-05-30
Attending: SPECIALIST
Payer: MEDICARE

## 2019-05-30 ENCOUNTER — HOSPITAL ENCOUNTER (OUTPATIENT)
Dept: LAB | Facility: MEDICAL CENTER | Age: 69
End: 2019-05-30
Attending: INTERNAL MEDICINE
Payer: MEDICARE

## 2019-05-30 DIAGNOSIS — R73.03 PREDIABETES: ICD-10-CM

## 2019-05-30 LAB
ALBUMIN SERPL BCP-MCNC: 3.9 G/DL (ref 3.2–4.9)
ALBUMIN/GLOB SERPL: 1.5 G/DL
ALP SERPL-CCNC: 73 U/L (ref 30–99)
ALT SERPL-CCNC: 13 U/L (ref 2–50)
ANION GAP SERPL CALC-SCNC: 8 MMOL/L (ref 0–11.9)
AST SERPL-CCNC: 19 U/L (ref 12–45)
BASOPHILS # BLD AUTO: 0.8 % (ref 0–1.8)
BASOPHILS # BLD: 0.04 K/UL (ref 0–0.12)
BILIRUB SERPL-MCNC: 0.7 MG/DL (ref 0.1–1.5)
BUN SERPL-MCNC: 15 MG/DL (ref 8–22)
CALCIUM SERPL-MCNC: 8.9 MG/DL (ref 8.5–10.5)
CHLORIDE SERPL-SCNC: 105 MMOL/L (ref 96–112)
CHOLEST SERPL-MCNC: 179 MG/DL (ref 100–199)
CO2 SERPL-SCNC: 27 MMOL/L (ref 20–33)
CREAT SERPL-MCNC: 0.83 MG/DL (ref 0.5–1.4)
EOSINOPHIL # BLD AUTO: 0.12 K/UL (ref 0–0.51)
EOSINOPHIL NFR BLD: 2.4 % (ref 0–6.9)
ERYTHROCYTE [DISTWIDTH] IN BLOOD BY AUTOMATED COUNT: 47.5 FL (ref 35.9–50)
EST. AVERAGE GLUCOSE BLD GHB EST-MCNC: 143 MG/DL
FASTING STATUS PATIENT QL REPORTED: NORMAL
GLOBULIN SER CALC-MCNC: 2.6 G/DL (ref 1.9–3.5)
GLUCOSE SERPL-MCNC: 129 MG/DL (ref 65–99)
HBA1C MFR BLD: 6.6 % (ref 0–5.6)
HCT VFR BLD AUTO: 44.4 % (ref 37–47)
HDLC SERPL-MCNC: 56 MG/DL
HGB BLD-MCNC: 13.9 G/DL (ref 12–16)
IMM GRANULOCYTES # BLD AUTO: 0.02 K/UL (ref 0–0.11)
IMM GRANULOCYTES NFR BLD AUTO: 0.4 % (ref 0–0.9)
LDLC SERPL CALC-MCNC: 97 MG/DL
LYMPHOCYTES # BLD AUTO: 1.08 K/UL (ref 1–4.8)
LYMPHOCYTES NFR BLD: 21.9 % (ref 22–41)
MCH RBC QN AUTO: 29.8 PG (ref 27–33)
MCHC RBC AUTO-ENTMCNC: 31.3 G/DL (ref 33.6–35)
MCV RBC AUTO: 95.1 FL (ref 81.4–97.8)
MONOCYTES # BLD AUTO: 0.56 K/UL (ref 0–0.85)
MONOCYTES NFR BLD AUTO: 11.4 % (ref 0–13.4)
NEUTROPHILS # BLD AUTO: 3.11 K/UL (ref 2–7.15)
NEUTROPHILS NFR BLD: 63.1 % (ref 44–72)
NRBC # BLD AUTO: 0 K/UL
NRBC BLD-RTO: 0 /100 WBC
PLATELET # BLD AUTO: 304 K/UL (ref 164–446)
PMV BLD AUTO: 10 FL (ref 9–12.9)
POTASSIUM SERPL-SCNC: 4.8 MMOL/L (ref 3.6–5.5)
PROT SERPL-MCNC: 6.5 G/DL (ref 6–8.2)
RBC # BLD AUTO: 4.67 M/UL (ref 4.2–5.4)
SODIUM SERPL-SCNC: 140 MMOL/L (ref 135–145)
TRIGL SERPL-MCNC: 132 MG/DL (ref 0–149)
WBC # BLD AUTO: 4.9 K/UL (ref 4.8–10.8)

## 2019-05-30 PROCEDURE — 80061 LIPID PANEL: CPT

## 2019-05-30 PROCEDURE — 80053 COMPREHEN METABOLIC PANEL: CPT

## 2019-05-30 PROCEDURE — 36415 COLL VENOUS BLD VENIPUNCTURE: CPT

## 2019-05-30 PROCEDURE — 85025 COMPLETE CBC W/AUTO DIFF WBC: CPT

## 2019-05-30 PROCEDURE — 83036 HEMOGLOBIN GLYCOSYLATED A1C: CPT

## 2019-10-07 DIAGNOSIS — I48.91 ATRIAL FIBRILLATION, UNSPECIFIED TYPE (HCC): ICD-10-CM

## 2019-10-07 RX ORDER — DABIGATRAN ETEXILATE 150 MG/1
150 CAPSULE ORAL 2 TIMES DAILY
Qty: 180 CAP | Refills: 0 | Status: SHIPPED | OUTPATIENT
Start: 2019-10-07 | End: 2019-10-23 | Stop reason: SDUPTHER

## 2019-10-23 ENCOUNTER — OFFICE VISIT (OUTPATIENT)
Dept: CARDIOLOGY | Facility: MEDICAL CENTER | Age: 69
End: 2019-10-23
Payer: MEDICARE

## 2019-10-23 VITALS
WEIGHT: 180.78 LBS | SYSTOLIC BLOOD PRESSURE: 118 MMHG | DIASTOLIC BLOOD PRESSURE: 80 MMHG | HEIGHT: 63 IN | OXYGEN SATURATION: 95 % | BODY MASS INDEX: 32.03 KG/M2 | HEART RATE: 80 BPM

## 2019-10-23 DIAGNOSIS — I48.91 ATRIAL FIBRILLATION, UNSPECIFIED TYPE (HCC): ICD-10-CM

## 2019-10-23 DIAGNOSIS — E78.5 DYSLIPIDEMIA: ICD-10-CM

## 2019-10-23 DIAGNOSIS — I10 ESSENTIAL HYPERTENSION: ICD-10-CM

## 2019-10-23 DIAGNOSIS — I65.23 BILATERAL CAROTID ARTERY STENOSIS: ICD-10-CM

## 2019-10-23 DIAGNOSIS — G45.9 TIA (TRANSIENT ISCHEMIC ATTACK): ICD-10-CM

## 2019-10-23 DIAGNOSIS — I48.0 PAROXYSMAL ATRIAL FIBRILLATION (HCC): ICD-10-CM

## 2019-10-23 DIAGNOSIS — Z79.01 CHRONIC ANTICOAGULATION: ICD-10-CM

## 2019-10-23 PROCEDURE — 99214 OFFICE O/P EST MOD 30 MIN: CPT | Performed by: INTERNAL MEDICINE

## 2019-10-23 RX ORDER — LOVASTATIN 40 MG/1
40 TABLET ORAL DAILY
Qty: 100 TAB | Refills: 3 | Status: SHIPPED | OUTPATIENT
Start: 2019-10-23 | End: 2020-05-29 | Stop reason: SDUPTHER

## 2019-10-23 RX ORDER — DABIGATRAN ETEXILATE 150 MG/1
150 CAPSULE ORAL 2 TIMES DAILY
Qty: 180 CAP | Refills: 3 | Status: SHIPPED | OUTPATIENT
Start: 2019-10-23 | End: 2020-10-27

## 2019-10-23 ASSESSMENT — ENCOUNTER SYMPTOMS
FOCAL WEAKNESS: 0
HEADACHES: 0
NAUSEA: 0
CHILLS: 0
ABDOMINAL PAIN: 0
DIZZINESS: 0
NEUROLOGICAL NEGATIVE: 1
DEPRESSION: 0
MYALGIAS: 0
GASTROINTESTINAL NEGATIVE: 1
BRUISES/BLEEDS EASILY: 0
BLURRED VISION: 0
PSYCHIATRIC NEGATIVE: 1
WEAKNESS: 0
VOMITING: 0
MUSCULOSKELETAL NEGATIVE: 1
WEIGHT LOSS: 0
NERVOUS/ANXIOUS: 0
CARDIOVASCULAR NEGATIVE: 1
EYES NEGATIVE: 1
FEVER: 0
COUGH: 0
SHORTNESS OF BREATH: 0
RESPIRATORY NEGATIVE: 1
DOUBLE VISION: 0
PALPITATIONS: 0
CONSTITUTIONAL NEGATIVE: 1
CLAUDICATION: 0

## 2019-10-23 NOTE — PROGRESS NOTES
"Chief Complaint   Patient presents with   • Atrial Fibrillation       Subjective:   Cristela Alan is a 69 y.o. female who presents today for follow up of atrial fibrillation on chronic anticoagulation therapy.    Since the patient's last visit on 12/14/18, she has been doing well clinically. She admits to rare palpitations. She denies chest pain, shortness of breath, nausea/vomiting or diaphoresis. She has been keeping busy after retirieng. She still paints pictures and quilting. She still exercises.    Past Medical History:   Diagnosis Date   • Allergic rhinitis    • Anesthesia     hypotension   • Atrial fibrillation (HCC)    • Cancer (HCC) 2014    endometrial   • CATARACT     bilateral lens implants   • Cholesterol blood decreased    • Dental disorder     dentures   • Dermatitis, contact    • Dyslipidemia 6/30/2016   • Endometrium cancer (HCC)    • Hyperlipidemia    • Hypertension    • Impaired glucose tolerance    • Macular degeneration    • Obesity    • Pneumonia     had PNU 35 years ago   • Prolapsed uterus 8/2014    current issue   • Stroke (HCC)     \"mini strokes\" TIA, no residual   • TIA (transient ischemic attack)    • Unspecified hemorrhagic conditions     NOSE BLEEDS/on Plavix   • Vitamin D deficiency      Past Surgical History:   Procedure Laterality Date   • HYSTERECTOMY ROBOTIC XI  8/28/2014    Performed by Pantera Wylie M.D. at SURGERY [facility]   • LYMPH NODE DISSECTION ROBOTIC XI  8/28/2014    Performed by Pantera Wylie M.D. at SURGERY [facility]   • CATARACT PHACO WITH IOL  8/19/2010    Performed by DAVE AGUILAR at SURGERY SAME DAY [facility] ORS   • CATARACT PHACO WITH IOL  8/5/2010    Performed by DAVE AGUILAR at SURGERY SAME DAY [facility] ORS   • HIP ARTHROSCOPY  5/12/08    Performed by ZINA HOLLIS at SURGERY [facility]   • ACETABULAR OSTEOTOMY  5/12/08    Performed by ZINA HOLLIS at [facility]   • ORTHOPEDIC OSTEOTOMY  5/12/08    Performed by " "ZINA HOLLIS at SURGERY [facility]   • NASAL POLYPECTOMY  1994    \"removal of papilloma\"   • ABDOMINAL HYSTERECTOMY TOTAL     • EYE SURGERY       Family History   Problem Relation Age of Onset   • Hyperlipidemia Mother    • Hypertension Mother    • Cancer Mother    • No Known Problems Father    • Heart Disease Sister    • Alcohol/Drug Sister    • Hypertension Sister    • Heart Attack Sister    • Cancer Sister 66        ? breast cancer as well   • Hypertension Sister    • Hyperlipidemia Sister    • Cancer Other    • Hypertension Other    • Diabetes Maternal Grandmother         elderly   • Other Sister         suicide    • Stroke Neg Hx      Social History     Socioeconomic History   • Marital status:      Spouse name: Not on file   • Number of children: Not on file   • Years of education: Not on file   • Highest education level: Not on file   Occupational History   • Not on file   Social Needs   • Financial resource strain: Not on file   • Food insecurity:     Worry: Not on file     Inability: Not on file   • Transportation needs:     Medical: Not on file     Non-medical: Not on file   Tobacco Use   • Smoking status: Never Smoker   • Smokeless tobacco: Never Used   Substance and Sexual Activity   • Alcohol use: No   • Drug use: No   • Sexual activity: Not Currently   Lifestyle   • Physical activity:     Days per week: Not on file     Minutes per session: Not on file   • Stress: Not on file   Relationships   • Social connections:     Talks on phone: Not on file     Gets together: Not on file     Attends [employer] service: Not on file     Active member of club or organization: Not on file     Attends meetings of clubs or organizations: Not on file     Relationship status: Not on file   • Intimate partner violence:     Fear of current or ex partner: Not on file     Emotionally abused: Not on file     Physically abused: Not on file     Forced sexual activity: Not on file   Other Topics Concern   • Not " "on file   Social History Narrative   • Not on file     Allergies   Allergen Reactions   • Fentanyl Anaphylaxis   • Versed Shortness of Breath and Vomiting   • Bee      And  spider--- tongue swelling   • Codeine      Increased heart rate   • Eggs Vomiting   • Flu Virus Vaccine    • Lidocaine Hcl      \"severe drop in BP\"   • Other Drug      Novocaine hypotension ALL \"MELISA\"   • Penicillins      Increased heart rate   • Soap Rash     Laundry Tide soap, Dove and Ivory soap   • Tape Rash     Paper tape OK   • Xylocaine [Lidocaine Hcl, Local Anesth.]      All \"MELISA\" hypotension     (Medications reviewed.)  Outpatient Encounter Medications as of 10/23/2019   Medication Sig Dispense Refill   • dabigatran (PRADAXA) 150 MG Cap capsule Take 1 Cap by mouth 2 Times a Day. TWICE DAILY 180 Cap 3   • lovastatin (MEVACOR) 40 MG tablet Take 1 Tab by mouth every day. 100 Tab 3   • metoprolol (LOPRESSOR) 25 MG Tab TAKE ONE TABLET BY MOUTH TWICE DAILY 180 Tab 3   • lisinopril (PRINIVIL) 5 MG Tab Take 1 Tab by mouth every day. 90 Tab 3   • Multiple Vitamins-Minerals (PRESERVISION AREDS PO) Take  by mouth.     • Cholecalciferol (VITAMIN D HIGH POTENCY PO) Take 800 mg by mouth every day.     • Polyethylene Glycol 400 (BLINK TEARS OP) by Ophthalmic route every day.     • Calcium 600 MG TABS Take 1 Tab by mouth every morning.     • multivitamin (THERAGRAN) TABS Take 1 Tab by mouth every morning.       • Polyethyl Glycol-Propyl Glycol (SYSTANE OP) Place 1 Drop in both eyes every bedtime. Indications: **OTC**     • [DISCONTINUED] dabigatran (PRADAXA) 150 MG Cap capsule Take 1 Cap by mouth 2 Times a Day. TWICE DAILY 180 Cap 0   • [DISCONTINUED] lovastatin (MEVACOR) 40 MG tablet Take 1 Tab by mouth every day. 100 Tab 0     No facility-administered encounter medications on file as of 10/23/2019.      Review of Systems   Constitutional: Negative.  Negative for chills, fever, malaise/fatigue and weight loss.   HENT: Negative.  Negative for " "hearing loss.    Eyes: Negative.  Negative for blurred vision and double vision.   Respiratory: Negative.  Negative for cough and shortness of breath.    Cardiovascular: Negative.  Negative for chest pain, palpitations, claudication and leg swelling.   Gastrointestinal: Negative.  Negative for abdominal pain, nausea and vomiting.   Genitourinary: Negative.  Negative for dysuria and urgency.   Musculoskeletal: Negative.  Negative for joint pain and myalgias.   Skin: Negative.  Negative for itching and rash.   Neurological: Negative.  Negative for dizziness, focal weakness, weakness and headaches.   Endo/Heme/Allergies: Negative.  Does not bruise/bleed easily.   Psychiatric/Behavioral: Negative.  Negative for depression. The patient is not nervous/anxious.         Objective:   /80 (BP Location: Left arm, Patient Position: Sitting, BP Cuff Size: Adult)   Pulse 80   Ht 1.6 m (5' 3\")   Wt 82 kg (180 lb 12.4 oz)   SpO2 95%   BMI 32.02 kg/m²     Physical Exam   Constitutional: She is oriented to person, place, and time. She appears well-developed and well-nourished.   HENT:   Head: Normocephalic and atraumatic.   Eyes: EOM are normal.   Neck: No JVD present.   Cardiovascular: Normal rate, regular rhythm and normal heart sounds.   Pulmonary/Chest: Effort normal and breath sounds normal.   Abdominal: Soft. Bowel sounds are normal.   No hepatosplenomegaly.   Musculoskeletal: Normal range of motion.   Lymphadenopathy:     She has no cervical adenopathy.   Neurological: She is alert and oriented to person, place, and time.   Skin: Skin is warm and dry.   Psychiatric: She has a normal mood and affect.     CARDIAC STUDIES/PROCEDURES:     CAROTID ULTRASOUND (06/18/18)  Mild bilateral internal carotid artery stenosis (<50%).     ECHOCARDIOGRAM CONCLUSIONS (06/18/18)  Compared to the images of the prior study.   Normal left ventricular systolic function.  Left ventricular ejection fraction is visually estimated to be " 75%.  Grade II diastolic dysfunction.  No significant valve abnormalities.      ECHOCARDIOGRAM CONCLUSIONS (12/05/12)  Technically difficult study  Normal left ventricular size, thickness, systolic function, and diastolic function.  Left ventricular ejection fraction is 55% to 60%.  Structurally normal mitral valve.  No stenosis or regurgitation seen.  Structurally normal aortic valve.  No stenosis or regurgitation seen.  Structurally normal tricuspid valve.  Trace tricuspid regurgitation.      EKG performed on (08/24/14) EKG shows normal sinus rhythm.  EKG performed on (07/30/14) EKG shows normal sinus rhythm.  EKG performed on (12/06/12) EKG shows normal sinus rhythm.  EKG performed on (12/05/12) EKG shows atrial fibrillation.     Laboratory results of (05/30/19) were reviewed. Cholesterol profile of 179/132/56/97 noted.  Laboratory results of (06/12/17) Cholesterol profile of 169/137/54/88 noted.  Laboratory results of (09/14/16) Cholesterol profile of 151/103/55/75 noted.  Laboratory results of (06/01/15) Cholesterol profile of 190/150/54/106 noted.     MYOCARDIAL PERFUSION STUDY CONCLUSIONS (06/18/18)  No evidence of significant jeopardized viable myocardium or prior myocardial infarction.  Normal left ventricular size, ejection fraction, and wall motion.  ECG INTERPRETATION  Negative stress ECG for ischemia.     MPI CONCLUSIONS (06/07/06)  NORMAL MYOCARDIAL PERFUSION IMAGES WITH NO EVIDENCE OF SIGNIFICANT   ISCHEMIA OR INFARCTION.    Assessment:     1. Paroxysmal atrial fibrillation (HCC)     2. Chronic anticoagulation     3. Essential hypertension     4. Dyslipidemia  lovastatin (MEVACOR) 40 MG tablet   5. Bilateral carotid artery stenosis     6. TIA (transient ischemic attack)     7. Atrial fibrillation, unspecified type (HCC)  dabigatran (PRADAXA) 150 MG Cap capsule       Medical Decision Making:  Today's Assessment / Status / Plan:     1. Atrial fibrillation on anticoagulation therapy (Pradaxa): She is  doing well on anticoagulation with rare palpitations.  2. Hypertension: Blood pressure is well controlled. We will continue with beta blockade therapy and ace inhibitor therapy.  3. Hyperlipidemia: She is doing well on statin therapy without myalgia symptoms.  4. Carotid artery stenosis: Clinically stable on above medical therapy.  5. History of trans-ischemic attack: She remains clinically stable without any new neurological symptoms.   6. Health maintenance: She is under surveillance for pulmonary nodules. She underwent surgery for uterine cancer (08/27/14).     We will follow up the patient in one year.     CC Prabha Olivas

## 2019-11-15 ENCOUNTER — HOSPITAL ENCOUNTER (OUTPATIENT)
Dept: RADIOLOGY | Facility: MEDICAL CENTER | Age: 69
End: 2019-11-15
Attending: INTERNAL MEDICINE
Payer: MEDICARE

## 2019-11-15 DIAGNOSIS — Z12.31 VISIT FOR SCREENING MAMMOGRAM: ICD-10-CM

## 2019-11-15 PROCEDURE — 77063 BREAST TOMOSYNTHESIS BI: CPT

## 2020-02-24 ENCOUNTER — TELEPHONE (OUTPATIENT)
Dept: SCHEDULING | Facility: IMAGING CENTER | Age: 70
End: 2020-02-24

## 2020-02-27 DIAGNOSIS — I10 ESSENTIAL HYPERTENSION: ICD-10-CM

## 2020-03-23 ENCOUNTER — OFFICE VISIT (OUTPATIENT)
Dept: MEDICAL GROUP | Facility: MEDICAL CENTER | Age: 70
End: 2020-03-23
Payer: MEDICARE

## 2020-03-23 VITALS
HEART RATE: 68 BPM | OXYGEN SATURATION: 96 % | HEIGHT: 63 IN | DIASTOLIC BLOOD PRESSURE: 60 MMHG | SYSTOLIC BLOOD PRESSURE: 112 MMHG | WEIGHT: 181.44 LBS | TEMPERATURE: 97.1 F | BODY MASS INDEX: 32.15 KG/M2

## 2020-03-23 DIAGNOSIS — G45.9 TRANSIENT ISCHEMIC ATTACK: ICD-10-CM

## 2020-03-23 DIAGNOSIS — I48.0 PAROXYSMAL ATRIAL FIBRILLATION (HCC): ICD-10-CM

## 2020-03-23 DIAGNOSIS — Z85.42 HISTORY OF ENDOMETRIAL CANCER: ICD-10-CM

## 2020-03-23 DIAGNOSIS — I51.89 DIASTOLIC DYSFUNCTION: ICD-10-CM

## 2020-03-23 DIAGNOSIS — Z78.0 MENOPAUSE: ICD-10-CM

## 2020-03-23 DIAGNOSIS — C54.1 MALIGNANT NEOPLASM OF ENDOMETRIUM (HCC): ICD-10-CM

## 2020-03-23 DIAGNOSIS — E66.9 OBESITY (BMI 30-39.9): ICD-10-CM

## 2020-03-23 DIAGNOSIS — E55.9 VITAMIN D DEFICIENCY: ICD-10-CM

## 2020-03-23 DIAGNOSIS — I65.23 BILATERAL CAROTID ARTERY STENOSIS: ICD-10-CM

## 2020-03-23 DIAGNOSIS — E11.9 TYPE 2 DIABETES MELLITUS WITHOUT COMPLICATION, WITHOUT LONG-TERM CURRENT USE OF INSULIN (HCC): ICD-10-CM

## 2020-03-23 DIAGNOSIS — I10 ESSENTIAL (PRIMARY) HYPERTENSION: ICD-10-CM

## 2020-03-23 DIAGNOSIS — E78.2 MIXED HYPERLIPIDEMIA: ICD-10-CM

## 2020-03-23 DIAGNOSIS — M85.852 OSTEOPENIA OF LEFT HIP: ICD-10-CM

## 2020-03-23 PROBLEM — R73.03 PREDIABETES: Status: ACTIVE | Noted: 2019-09-03

## 2020-03-23 PROCEDURE — 99205 OFFICE O/P NEW HI 60 MIN: CPT | Performed by: INTERNAL MEDICINE

## 2020-03-23 PROCEDURE — 8041 PR SCP AHA: Performed by: INTERNAL MEDICINE

## 2020-03-23 ASSESSMENT — FIBROSIS 4 INDEX: FIB4 SCORE: 1.23

## 2020-03-23 ASSESSMENT — PATIENT HEALTH QUESTIONNAIRE - PHQ9: CLINICAL INTERPRETATION OF PHQ2 SCORE: 0

## 2020-03-23 NOTE — ASSESSMENT & PLAN NOTE
Previous problem that requires follow-up.  We will recheck vitamin D level to make sure she is on adequate replacement.  Recommend at least vitamin D 2000 international units daily due to history of osteopenia.

## 2020-03-23 NOTE — ASSESSMENT & PLAN NOTE
New problem by my assessment, noted on echocardiogram in 2018.  Discussed with patient that mainstay of management for diastolic dysfunction is well controlled blood pressure and maintaining euvolemia.  No signs of fluid overload on exam.  Blood pressure is very well controlled on current regiment.  She voiced understanding would let me know if she starts to notice an increase in either of these areas.

## 2020-03-23 NOTE — ASSESSMENT & PLAN NOTE
Chronic and ongoing problem.  Discussed with patient that she meets criteria for initiation of antiresorptive therapy.  Asked her to double check and make sure she is taking minimum calcium 1200 mg daily vitamin D 2000 international units daily.  Recommend we first repeat her bone densities so I can determine the best recommendation for antiresorptive therapy.  She would probably do fine with alendronate 70 mg daily pending ongoing normal renal function.

## 2020-03-23 NOTE — ASSESSMENT & PLAN NOTE
Chronic and stable problem.  She is appropriate to continue on lisinopril 5 mg daily metoprolol tartrate 25 mg twice daily.  No concerning symptoms at this time.  Continue follow-up with cardiology as recommended.

## 2020-03-23 NOTE — NON-PROVIDER
CC:  Health Risk Assessment Exam.    HPI:  Cristela is a 69 y.o. here for Health Risk Assessment.     PCP: Temitope Bess D.O.  Other specialists:  Derm:  GI:  Ophthal:  Other:     Patient Active Problem List    Diagnosis Date Noted   • Atrial fibrillation (HCC) 12/06/2012     Priority: High   • Chronic anticoagulation 12/06/2012     Priority: High   • Essential (primary) hypertension 12/06/2012     Priority: Medium   • Dyslipidemia 12/06/2012     Priority: Medium   • Transient ischemic attack 12/06/2012     Priority: Low   • Prediabetes 09/03/2019   • Carotid artery stenosis 12/14/2018   • Pulmonary nodule 11/09/2016   • Macular degeneration 05/10/2016   • Impaired fasting glucose 05/10/2016   • Hernia of abdominal wall 05/10/2016   • Family history of cancer 05/10/2016   • Vitamin D deficiency 10/28/2015   • Macular degeneration, bilateral 10/28/2015   • Cough 05/06/2015   • Encounter for general adult medical examination without abnormal findings 12/02/2014   • Malignant neoplasm of endometrium (HCC) 08/28/2014   • Obesity 05/13/2014   • Allergic rhinitis 05/13/2014   • Hyperlipidemia 05/13/2014   • Impaired glucose tolerance 05/13/2014   • Unspecified contact dermatitis, unspecified cause 05/13/2014     Current Outpatient Medications   Medication Sig Dispense Refill   • metoprolol (LOPRESSOR) 25 MG Tab TAKE ONE TABLET BY MOUTH TWICE DAILY 200 Tab 3   • dabigatran (PRADAXA) 150 MG Cap capsule Take 1 Cap by mouth 2 Times a Day. TWICE DAILY 180 Cap 3   • lovastatin (MEVACOR) 40 MG tablet Take 1 Tab by mouth every day. 100 Tab 3   • lisinopril (PRINIVIL) 5 MG Tab Take 1 Tab by mouth every day. 90 Tab 3   • Multiple Vitamins-Minerals (PRESERVISION AREDS PO) Take  by mouth.     • Cholecalciferol (VITAMIN D HIGH POTENCY PO) Take 800 mg by mouth every day.     • Polyethylene Glycol 400 (BLINK TEARS OP) by Ophthalmic route every day.     • Calcium 600 MG TABS Take 1 Tab by mouth every morning.     • multivitamin  (THERAGRAN) TABS Take 1 Tab by mouth every morning.       • Polyethyl Glycol-Propyl Glycol (SYSTANE OP) Place 1 Drop in both eyes every bedtime. Indications: **OTC**       No current facility-administered medications for this visit.       Current supplements: ***  Chronic narcotic pain medicines: {yes no:287442}  Allergies: Fentanyl; Flu virus vaccine; Versed; Bee; Codeine; Eggs; Lidocaine hcl; Other drug; Penicillins; Soap; Tape; and Xylocaine [lidocaine hcl, local anesth.]    Screening:  Depressive Symptoms: Denies feeling down, depressed or hopeless. Denies loss of interest or pleasure in doing things***  ADLs: Denies needing help with using telephone, transportation, shopping, preparing meals, housework, laundry, or managing medication or money. ***  Independent with bathing, hygiene, feeding, toileting, dressing ***  Memory concerns: Denies difficulty remembering details of conversations, events and upcoming appointments.  Hearing problems: Denies.   Recent falls {yes no:706602}    Current social contact/activities: ***  Social History     Tobacco Use   • Smoking status: Never Smoker   • Smokeless tobacco: Never Used   Substance Use Topics   • Alcohol use: No   • Drug use: No       Family History   Problem Relation Age of Onset   • Hyperlipidemia Mother    • Hypertension Mother    • Cancer Mother    • No Known Problems Father    • Heart Disease Sister    • Alcohol/Drug Sister    • Hypertension Sister    • Heart Attack Sister    • Cancer Sister 66        ? breast cancer as well   • Hypertension Sister    • Hyperlipidemia Sister    • Cancer Other    • Hypertension Other    • Diabetes Maternal Grandmother         elderly   • Other Sister         suicide    • Stroke Neg Hx      Past Surgical History:   Procedure Laterality Date   • HYSTERECTOMY ROBOTIC XI 8/28/2014    Performed by Pantera Wylie M.D. at SURGERY [facility]   • LYMPH NODE DISSECTION ROBOTIC XI 8/28/2014    Performed by Pantera Wylie M.D. at  "SURGERY [facility] ORS   • CATARACT PHACO WITH IOL  8/19/2010    Performed by DAVE AGUILAR at SURGERY SAME DAY [facility] ORS   • CATARACT PHACO WITH IOL  8/5/2010    Performed by DAVE AGUILAR at SURGERY SAME DAY [facility] ORS   • HIP ARTHROSCOPY  5/12/08    Performed by ZINA HOLLIS at SURGERY [facility]S   • ACETABULAR OSTEOTOMY  5/12/08    Performed by ZINA HOLLIS at SURGERY [facility]S   • ORTHOPEDIC OSTEOTOMY  5/12/08    Performed by ZINA HOLLIS at [facility] ORS   • NASAL POLYPECTOMY  1994    \"removal of papilloma\"   • ABDOMINAL HYSTERECTOMY TOTAL     • EYE SURGERY         ROS:    Ostomy or other tubes or amputations: {yes no:402629}  Chronic oxygen use {yes no:587843}  No fever, chills, sweats.  No polydipsia, polyuria, temperature intolerance, significant weight changes  No visual changes, blurred vision. Last eye exam ***  No chest pain, palpitations, peripheral swelling  No chronic cough, shortness of breath, dyspnea with exertion.  No dysphagia, odynophagia, black or bloody stools.  No abdominal pain, nausea, persistent diarrhea, constipation  No dysuria, hematuria, incontinence. *** nocturia  No rash, pruritis, pigment changes.  No focal weakness, syncope, headache, confusion, persistent numbness.  All other systems are reviewed and negative.        There were no vitals taken for this visit. There is no height or weight on file to calculate BMI.    Physical Exam:  Gait: normal. Uses assistive device : {yes no:743213}  General appearance: Alert in no acute distress.  Eyes: EOMI, PERRL, conjunctivae non-injected, sclerae non-icteric. No ptosis or proptosis.  Ears: normal pinnae and external auditory canals. Hearing ***. TM pearly gray with normal light reflex bilaterally.  Nose: nares patent with no significant congestion.   Oropharynx: pink and moist with no thrush. Dentition ***  Neck: supple with no cervical LAD, thyroid nodules, JVD or carotid bruits.  Lungs: CTAB " with good excursion. No crackles or wheeze.   Heart: RRR without significant murmur.   Chest: no subareolar masses or axillary adenopathy.   Abdomen: Abdomen soft, non-tender. BS normal. No masses appreciated. No inguinal adenopathy.  Extremities: warm and well-perfused with no edema. Feet with no maceration, ulcers, fissures.   Muscles: strength 5/5 UE and LE, proximal and distal motor groups. Full range motion all joints no major deformities.  Neuro:     CN II-XII grossly intact.           Skin:        No concerning lesions or rashes.      Assessment and Plan. The following treatment and monitoring plan is recommended:   There are no diagnoses linked to this encounter.      Health Care Screening recommendations reviewed with patient today and updated or ordered.  DPA/Advanced directive: Completed/Information provided. ***   Referrals for PT/OT/Nutrition counseling/Behavioral Health/Smoking cessation as above if indicated  Discussion today about general wellness and lifestyle habits:    · Prevent falls and reduce trip hazards;   · Have a working fire alarm and carbon monoxide detector;   · Engage in regular physical activity and social activities;   · Use sun protection when outdoors. ***

## 2020-03-23 NOTE — PROGRESS NOTES
Annual Health Assessment Questions:    1.  Are you currently engaging in any exercise or physical activity? Yes    2.  How would you describe your mood or emotional well-being today? good    3.  Have you had any falls in the last year? No    4.  Have you noticed any problems with your balance or had difficulty walking? No    5.  In the last six months have you experienced any leakage of urine? No    6. DPA/Advanced Directive: Patient does not have an Advanced Directive.  A packet and workshop information was given on Advanced Directives.     Subjective:     CC:  Diagnoses of Osteopenia of left hip, Paroxysmal atrial fibrillation (HCC), Malignant neoplasm of endometrium (HCC), Menopause, Diastolic dysfunction, Bilateral carotid artery stenosis, Mixed hyperlipidemia, Type 2 diabetes mellitus without complication, without long-term current use of insulin (HCC), Transient ischemic attack, Vitamin D deficiency, Essential (primary) hypertension, Obesity (BMI 30-39.9), and History of endometrial cancer were pertinent to this visit.    HISTORY OF THE PRESENT ILLNESS: Patient is a 69 y.o. female. This pleasant patient is here today to establish care and discuss the below stated chronic medical conditions. She is unaccompanied for today's visit.    Problem   Atrial Fibrillation (Hcc)    EKG performed on (08/24/14) EKG shows normal sinus rhythm.  EKG performed on (07/30/14) EKG shows normal sinus rhythm.  EKG performed on (12/06/12) EKG shows normal sinus rhythm.  EKG performed on (12/05/12) EKG shows atrial fibrillation.    She reports waking up one morning and experiencing palpitations.  Upon further evaluation she was found to be in atrial fibrillation.  This was in December 2012.  She was treated with cardioversion and since that time has maintained normal sinus rhythm.  She notes very occasionally she will feel palpitations, and thus it is labeled as paroxysmal atrial fibrillation.  She has a rate control approach with  metoprolol tartrate 25 mg twice daily.  She is also on Pradaxa for systemic anticoagulation and stroke risk management.  She follows with cardiology, Dr. Avery, annually.       Essential (Primary) Hypertension       Ref. Range 5/30/2019 08:21   Sodium Latest Ref Range: 135 - 145 mmol/L 140   Potassium Latest Ref Range: 3.6 - 5.5 mmol/L 4.8   Chloride Latest Ref Range: 96 - 112 mmol/L 105   Co2 Latest Ref Range: 20 - 33 mmol/L 27   Anion Gap Latest Ref Range: 0.0 - 11.9  8.0   Glucose Latest Ref Range: 65 - 99 mg/dL 129 (H)   Bun Latest Ref Range: 8 - 22 mg/dL 15   Creatinine Latest Ref Range: 0.50 - 1.40 mg/dL 0.83   GFR If Non  Latest Ref Range: >60 mL/min/1.73 m 2 >60       She has a longstanding history of hypertension.  She is currently taking lisinopril 5 mg daily and metoprolol tartrate 25 mg twice daily.  She has diagnosis of paroxysmal atrial fibrillation and diastolic dysfunction.  Most recent echocardiogram was stable and EKG has maintained normal sinus rhythm.  No chest pain or pressure, no dyspnea on exertion, no presyncope or syncope.  Electrolytes and kidney function are stable.  No signs or symptoms of orthostasis.     Transient Ischemic Attack    She has a prior diagnosis of TIA.  Apparently presented as vertigo.  She was found to have cerebrovascular disease on brain MRI.  I do not have her MRI results available to review.  She is on Pradaxa and statin therapy.  She has well-controlled blood pressure on a 2 drug regiment.  She has a history of atrial fibrillation.  She also has mild carotid artery disease.      Osteopenia of left hip with elevated FRAX    Bone Density (5/2016):   lumbar spine T score of 0.7  proximal left femur T score of -1.1     When compared with the most recent study dated 12/4/2009, there has been a 14.6% increase in the bone mineral density of the lumbar spine and a 4.1% decrease in the bone mineral density of the left femur.     IMPRESSION:  According to the  World Health Organization classification, bone mineral density of this patient is normal for the lumbar spine and osteopenic for the left femur. Increase in bone density in the lumbar spine since the most recent exam is statistically significant. Decrease in bone density in the left femur since the most recent exam is statistically significant.     10-year Probability of Fracture:  Major Osteoporotic     18.4%  Hip     3.3%    She has a history of osteopenia on prior bone density.  However, reviewing her FRAX she actually met criteria for initiation of antiresorptive therapy.  Patient was unaware of this finding.  She denies any history of fragility fractures.  She is taking calcium and vitamin D but unclear if she is taking enough at this time.     Diastolic Dysfunction    Echocardiogram (6/2018): Normal left ventricular size, thickness and systolic function. LVEF 75%. Normal left ventricular systolic function. Grade II diastolic dysfunction.    Reviewed with patient in clinic that her last echocardiogram was consistent with grade 2 diastolic dysfunction.  This can be a normal finding for many females as they age.  To mitigate risk of progression into diastolic heart failure would recommend tight control of blood pressure and maintaining euvolemia.     Type 2 Diabetes Mellitus Without Complication, Without Long-Term Current Use of Insulin ([facility])       Ref. Range 7/19/2018 08:56 5/30/2019 08:21   Glycohemoglobin Latest Ref Range: 0.0 - 5.6 % 6.1 (H) 6.6 (H)   Estim. Avg Glu Latest Units: mg/dL 128 143     She technically met criteria for type 2 diabetes as of May 2019.  Does not appear this diagnosis was ever given to the patient.  She is never taken any glucose lowering medications.  She is on statin therapy for history of elevated cholesterol mini stroke and she is also on ACE inhibitor for history of hypertension.  No known history of retinopathy, nephropathy, neuropathy but will require additional evaluation.   Patient admits that she does not follow a low sugar low-carb diet at this time.     Obesity (Bmi 30-39.9)    Current BMI 32, weight is 181 pounds.  She has ranged between 180 and 186 pounds in the past year.     Carotid Artery Stenosis    Carotid US (6/2018): Mild bilateral internal carotid artery stenosis (<50%).     This was noted on carotid ultrasound performed in June 2018.  She is found to have mild carotid artery stenosis.  She is on statin therapy and systemic anticoagulation with Pradaxa.  She follows with cardiology on an annual basis.     Vitamin D Deficiency       Ref. Range 9/14/2016 08:37 7/19/2018 08:56   25-Hydroxy   Vitamin D 25 Latest Ref Range: 30 - 100 ng/mL 47 48     She has prior history of vitamin D deficiency.  She also has osteopenia on vitamin D replacement.  No known history of parathyroid disease or chronic kidney disease.     History of Endometrial Cancer    In March 2014 she developed a 5-day episode of postmenopausal bleeding.  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  She saw Gyn/Onc in July 2014 and underwent robotic hysterectomy with BSO and pelvic lymph node dissection with da Javi in August 2014.  Pathology demonstrated a uterine tumor size 5 x 2.5 x 1.5 cm with negative myometrial invasion, G3, 0 of 19 nodes positive, negative LVSI.  She is diagnosed with stage Ia G3 endometrial cancer, no further treatment required.  Genetic testing completed May 2015 was negative for Ashanti mutation.  Identified with intermittent episodes of spotting. She completed endometrial biopsy which demonstrated stage 1 cancer, treated with DARRIN-BSO and lymph node biopsies.   She saw Dr. Wylie and Asya ROBERTSON in follow up, over 5 years out from remission.        Hyperlipidemia       Ref. Range 5/30/2019 08:21   Cholesterol,Tot Latest Ref Range: 100 - 199 mg/dL 179   Triglycerides Latest Ref  Range: 0 - 149 mg/dL 132   HDL Latest Ref Range: >=40 mg/dL 56   LDL Latest Ref Range: <100 mg/dL 97     She has history of elevated cholesterol in the past.  She is taking lovastatin which will be an ongoing recommendation due to history of mini strokes, atrial fibrillation, and impaired fasting glucose.  No myalgias or GI upset on this medication.       Allergies: Fentanyl; Flu virus vaccine; Versed; Bee; Codeine; Eggs; Lidocaine hcl; Other drug; Penicillins; Soap; Tape; and Xylocaine [lidocaine hcl, local anesth.]    Current Outpatient Medications Ordered in Epic   Medication Sig Dispense Refill   • metoprolol (LOPRESSOR) 25 MG Tab TAKE ONE TABLET BY MOUTH TWICE DAILY 200 Tab 3   • dabigatran (PRADAXA) 150 MG Cap capsule Take 1 Cap by mouth 2 Times a Day. TWICE DAILY 180 Cap 3   • lovastatin (MEVACOR) 40 MG tablet Take 1 Tab by mouth every day. 100 Tab 3   • lisinopril (PRINIVIL) 5 MG Tab Take 1 Tab by mouth every day. 90 Tab 3   • Multiple Vitamins-Minerals (PRESERVISION AREDS PO) Take  by mouth.     • Cholecalciferol (VITAMIN D HIGH POTENCY PO) Take 800 mg by mouth every day.     • Polyethylene Glycol 400 (BLINK TEARS OP) by Ophthalmic route every day.     • Calcium 600 MG TABS Take 1 Tab by mouth every morning.     • multivitamin (THERAGRAN) TABS Take 1 Tab by mouth every morning.       • Polyethyl Glycol-Propyl Glycol (SYSTANE OP) Place 1 Drop in both eyes every bedtime. Indications: **OTC**       No current Epic-ordered facility-administered medications on file.        Past Medical History:   Diagnosis Date   • Allergic rhinitis    • Anesthesia     hypotension   • Atrial fibrillation (HCC)    • Cancer (HCC) 2014    endometrial   • CATARACT     bilateral lens implants   • Cholesterol blood decreased    • Dental disorder     dentures   • Dermatitis, contact    • Dyslipidemia 6/30/2016   • Encounter for general adult medical examination without abnormal findings 12/2/2014    Alexia for GI No flu shots- egg  "allergies  Declines all vaccines- had reaction to tdap   • Endometrium cancer (HCC)    • Hyperlipidemia    • Hypertension    • Impaired glucose tolerance    • Macular degeneration    • Obesity    • Pneumonia     had PNU 35 years ago   • Prolapsed uterus 8/2014    current issue   • Pulmonary nodule 11/9/2016    No history of tobacco use CT 9/16, 12/16- no change-6/17- ? New nodule>>PET CT-10/17 neg    • Stroke (HCC)     \"mini strokes\" TIA, no residual   • TIA (transient ischemic attack)    • Unspecified hemorrhagic conditions     NOSE BLEEDS/on Plavix   • Vitamin D deficiency        Past Surgical History:   Procedure Laterality Date   • HYSTERECTOMY ROBOTIC XI  8/28/2014    Performed by Pantera Wylie M.D. at SURGERY [facility]S   • LYMPH NODE DISSECTION ROBOTIC XI  8/28/2014    Performed by Pantera Wylie M.D. at SURGERY [facility] ORS   • CATARACT PHACO WITH IOL  8/19/2010    Performed by DAVE AGUILAR at SURGERY SAME DAY [facility] ORS   • CATARACT PHACO WITH IOL  8/5/2010    Performed by DAVE AGUILAR at SURGERY SAME DAY [facility] ORS   • HIP ARTHROSCOPY  5/12/08    Performed by ZINA HOLLIS at SURGERY [facility] ORS   • ACETABULAR OSTEOTOMY  5/12/08    Performed by ZINA HOLLIS at SURGERY [facility] ORS   • ORTHOPEDIC OSTEOTOMY  5/12/08    Performed by ZINA HOLLIS at SURGERY [facility] ORS   • NASAL POLYPECTOMY  1994    \"removal of papilloma\"   • ABDOMINAL HYSTERECTOMY TOTAL     • EYE SURGERY         Social History     Tobacco Use   • Smoking status: Never Smoker   • Smokeless tobacco: Never Used   • Tobacco comment: continued abstinence   Substance Use Topics   • Alcohol use: No   • Drug use: No       Social History     Social History Narrative    She is  to Art for 49 years, they met at a [email or web address]. She has a daughter Nikki (43, [place]) and she has 2 kids. She retired in her late 60's. 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       Family History   Problem Relation " "Age of Onset   • Hyperlipidemia Mother    • Hypertension Mother    • Cancer Mother    • No Known Problems Father    • Heart Disease Sister    • Alcohol/Drug Sister    • Hypertension Sister    • Heart Attack Sister    • Cancer Sister 66        ? breast cancer as well   • Hypertension Sister    • Hyperlipidemia Sister    • Cancer Other    • Hypertension Other    • Diabetes Maternal Grandmother         elderly   • Other Sister         suicide    • Stroke Neg Hx        Health Maintenance: Completed    ROS:   As above in the HPI All Others Reviewed and Negative  Objective:     Exam: /60 (BP Location: Left arm, Patient Position: Sitting, BP Cuff Size: Adult)   Pulse 68   Temp 36.2 °C (97.1 °F) (Temporal)   Ht 1.6 m (5' 3\")   Wt 82.3 kg (181 lb 7 oz)   SpO2 96%  Body mass index is 32.14 kg/m².    General: Normal appearing. No distress. Appears stated age.  EENT: Eyes conjunctiva clear lids without ptosis, extraocular movements intact, ears normal shape and contour, canals are clear bilaterally, tympanic membranes are benign, oropharynx is without erythema, edema or exudates. Upper dentures in place, fair dentition of lower teeth.   Neck: Supple without JVD. Thyroid is not enlarged. No carotid bruits.  Pulmonary: Clear to ausculation.  Normal effort. No rales, ronchi, or wheezing. No cough.  Cardiovascular: Regular rate and rhythm without murmur. No lower extremity edema bilaterally.  Abdomen: Soft, nontender, nondistended. Normal bowel sounds. Ventral hernia.  Neurologic: No resting tremor, no increased tone or rigidity.   Lymph: No cervical or supraclavicular lymph nodes are palpable  Skin: Warm and dry.  No obvious lesions on skin exposed areas. Stable mole on right lateral neck.  Musculoskeletal: Normal gait. No extremity cyanosis, clubbing, or edema. Patient ambulates independently and without a gait aid.  Psych: Normal mood and affect. Judgment and insight is normal.    Assessment & Plan:   69 y.o. female " with the following -    Problem List Items Addressed This Visit     Atrial fibrillation (HCC)     Chronic and stable problem.  Continue follow-up with cardiology annually.  Continue metoprolol tartrate 25 mg twice daily and Pradaxa 150 mg twice daily.         Relevant Orders    CBC WITH DIFFERENTIAL    Comp Metabolic Panel    Essential (primary) hypertension     Chronic and stable problem.  She is appropriate to continue on lisinopril 5 mg daily metoprolol tartrate 25 mg twice daily.  No concerning symptoms at this time.  Continue follow-up with cardiology as recommended.         History of endometrial cancer     Chronic and stable problem.  Continue follow-up with gynecology/oncology as recommended.          Vitamin D deficiency     Previous problem that requires follow-up.  We will recheck vitamin D level to make sure she is on adequate replacement.  Recommend at least vitamin D 2000 international units daily due to history of osteopenia.         Relevant Orders    VITAMIN D,25 HYDROXY    Carotid artery stenosis     Chronic and stable problem.  Patient endorses history of mini strokes in the past, unclear if this was progression of carotid plaque versus embolic phenomenon due to known atrial fibrillation.  Recommend continuation of statin therapy and Pradaxa.  Continue follow-up with cardiology as recommended.         Relevant Orders    Lipid Profile    Hyperlipidemia     Chronic and stable problem.  Continue lovastatin.  Continue follow-up with cardiology as recommended.  Overdue for blood work, will order lipid panel to ensure ongoing stability.         Relevant Orders    Lipid Profile    Osteopenia of left hip with elevated FRAX     Chronic and ongoing problem.  Discussed with patient that she meets criteria for initiation of antiresorptive therapy.  Asked her to double check and make sure she is taking minimum calcium 1200 mg daily vitamin D 2000 international units daily.  Recommend we first repeat her bone  densities so I can determine the best recommendation for antiresorptive therapy.  She would probably do fine with alendronate 70 mg daily pending ongoing normal renal function.         Relevant Orders    DS-BONE DENSITY STUDY (DEXA)    Diastolic dysfunction     New problem by my assessment, noted on echocardiogram in 2018.  Discussed with patient that mainstay of management for diastolic dysfunction is well controlled blood pressure and maintaining euvolemia.  No signs of fluid overload on exam.  Blood pressure is very well controlled on current regiment.  She voiced understanding would let me know if she starts to notice an increase in either of these areas.         Type 2 diabetes mellitus without complication, without long-term current use of insulin (HCC)     New problem by my assessment, requires additional evaluation.  Last set of lab work was nearly 10 months ago.  We will recheck her A1c at this time along with urine microalbumin, cholesterol, and metabolic panel.  Will determine if she is still meets criteria for diabetes and if so we will discuss potential lifestyle modifications as well as pharmacologic recommendations.  Patient voiced understanding.  Continue ACE inhibitor and statin therapy at this time.         Relevant Orders    CBC WITH DIFFERENTIAL    Comp Metabolic Panel    Lipid Profile    HEMOGLOBIN A1C    MICROALBUMIN CREAT RATIO URINE    Obesity (BMI 30-39.9)     Chronic and stable problem.  Recommend healthy dietary choices and regular physical activity to help reduce weight.         Relevant Orders    Patient identified as having weight management issue.  Appropriate orders and counseling given.    Transient ischemic attack     Previous problem, no recurrence.  Would be helpful to have her brain MRI results to see if the changes were felt to be prior cerebral infarcts or chronic microvascular ischemic changes/gliosis.  Either way she does not have any long-term sequelae from her  cerebrovascular disease.  Would recommend ongoing use of statin therapy and systemic anticoagulation especially in light of her known carotid artery disease and atrial fibrillation.  Recommend continued tight control of blood pressure.           Other Visit Diagnoses     Menopause        Relevant Orders    DS-BONE DENSITY STUDY (DEXA)           Return in about 6 months (around 9/23/2020).    Please note that this dictation was created using voice recognition software. I have made every reasonable attempt to correct obvious errors, but I expect that there are errors of grammar and possibly content that I did not discover before finalizing the note.

## 2020-03-23 NOTE — ASSESSMENT & PLAN NOTE
New problem by my assessment, requires additional evaluation.  Last set of lab work was nearly 10 months ago.  We will recheck her A1c at this time along with urine microalbumin, cholesterol, and metabolic panel.  Will determine if she is still meets criteria for diabetes and if so we will discuss potential lifestyle modifications as well as pharmacologic recommendations.  Patient voiced understanding.  Continue ACE inhibitor and statin therapy at this time.

## 2020-03-23 NOTE — ASSESSMENT & PLAN NOTE
Chronic and stable problem.  Continue lovastatin.  Continue follow-up with cardiology as recommended.  Overdue for blood work, will order lipid panel to ensure ongoing stability.

## 2020-03-23 NOTE — ASSESSMENT & PLAN NOTE
Chronic and stable problem.  Patient endorses history of mini strokes in the past, unclear if this was progression of carotid plaque versus embolic phenomenon due to known atrial fibrillation.  Recommend continuation of statin therapy and Pradaxa.  Continue follow-up with cardiology as recommended.

## 2020-03-23 NOTE — LETTER
[facility]  Temitope Bess D.O.  [address]  [place] [address]  Fax: [phone]   Authorization for Release/Disclosure of   Protected Health Information   Name: ALVARO ARTEAGA : 1950 SSN: [id number]   Address: [facility] [place] [address] Phone:    [phone] (home)    I authorize the entity listed below to release/disclose the PHI below to:   [facility]/Temitope Bess D.O. and Temitope Bess D.O.   Provider or Entity Name:    Romana Aminbakhsh MD   Reason for request: continuity of care   Information to be released:    [ X ] LAST COLONOSCOPY,  including any PATH REPORT and follow-up  [ X ] LAST FIT/COLOGUARD RESULT [  ] LAST DEXA  [  ] LAST MAMMOGRAM  [  ] LAST PAP  [  ] LAST LABS [  ] RETINA EXAM REPORT  [  ] IMMUNIZATION RECORDS  [  ] Release all info      [  ] Check here and initial the line next to each item to release ALL health information INCLUDING  _____ Care and treatment for drug and / or alcohol abuse  _____ HIV testing, infection status, or AIDS  _____ Genetic Testing    DATES OF SERVICE OR TIME PERIOD TO BE DISCLOSED: _____________  I understand and acknowledge that:  * This Authorization may be revoked at any time by you in writing, except if your health information has already been used or disclosed.  * Your health information that will be used or disclosed as a result of you signing this authorization could be re-disclosed by the recipient. If this occurs, your re-disclosed health information may no longer be protected by State or Federal laws.  * You may refuse to sign this Authorization. Your refusal will not affect your ability to obtain treatment.  * This Authorization becomes effective upon signing and will  on (date) __________.      If no date is indicated, this Authorization will  one (1) year from the signature date.    Name: Alvaro Arteaga    Signature:   Date:     3/23/2020       PLEASE FAX REQUESTED RECORDS BACK TO:  [phone]

## 2020-03-23 NOTE — ASSESSMENT & PLAN NOTE
Chronic and stable problem.  Recommend healthy dietary choices and regular physical activity to help reduce weight.

## 2020-03-23 NOTE — ASSESSMENT & PLAN NOTE
Previous problem, no recurrence.  Would be helpful to have her brain MRI results to see if the changes were felt to be prior cerebral infarcts or chronic microvascular ischemic changes/gliosis.  Either way she does not have any long-term sequelae from her cerebrovascular disease.  Would recommend ongoing use of statin therapy and systemic anticoagulation especially in light of her known carotid artery disease and atrial fibrillation.  Recommend continued tight control of blood pressure.

## 2020-05-29 DIAGNOSIS — E78.5 DYSLIPIDEMIA: ICD-10-CM

## 2020-05-29 RX ORDER — LOVASTATIN 40 MG/1
40 TABLET ORAL DAILY
Qty: 100 TAB | Refills: 2 | Status: SHIPPED | OUTPATIENT
Start: 2020-05-29 | End: 2020-11-17 | Stop reason: SDUPTHER

## 2020-06-04 DIAGNOSIS — I10 ESSENTIAL HYPERTENSION: ICD-10-CM

## 2020-06-04 RX ORDER — LISINOPRIL 5 MG/1
5 TABLET ORAL DAILY
Qty: 100 TAB | Refills: 3 | Status: SHIPPED | OUTPATIENT
Start: 2020-06-04 | End: 2021-07-12 | Stop reason: SDUPTHER

## 2020-06-29 ENCOUNTER — HOSPITAL ENCOUNTER (OUTPATIENT)
Dept: RADIOLOGY | Facility: MEDICAL CENTER | Age: 70
End: 2020-06-29
Attending: INTERNAL MEDICINE
Payer: MEDICARE

## 2020-06-29 DIAGNOSIS — Z78.0 MENOPAUSE: ICD-10-CM

## 2020-06-29 DIAGNOSIS — M85.852 OSTEOPENIA OF LEFT HIP: ICD-10-CM

## 2020-06-29 PROCEDURE — 77080 DXA BONE DENSITY AXIAL: CPT

## 2020-06-29 NOTE — RESULT ENCOUNTER NOTE
Med Archibald,    I have your bone density results.  Your left hip is the main area of concern, your osteopenia has continued to progress and has declined by 9% since your last check in 2015.  What is more concerning is that your fracture risk is very elevated, enough so that we would recommend a prescription grade medicine to help rebuild up the bone mass.    There are many options for the situation.  We have a once weekly (Fosamax/alendronate) or once monthly (Boniva/ibandronate) formulation of a drug class called bisphosphonates which act as a hammer to push the calcium and vitamin D back into the bones.  We usually use this for about 5 years at a time and then give you a break while continuing to check your bone density every 2 years to make sure you are improving.  There is also a shot twice a year called Prolia however this is typically reserved for those with kidney disease is wants to start this medicine you are usually on it for the rest of your life.  The last medicine is the IV version of bisphosphonates called Reclast where you go to the infusion center once a year, they place an IV, and you are given the medicine over 20-minutes.  We usually do this for 3 years in a row and then take a break.    Let me know your initial thoughts and questions or if you want to move up your appointment.  As long as you are on calcium and vitamin D that certainly better than nothing and we do not have to make a decision immediately.    Thanks,  Dr. Bess

## 2020-07-30 ENCOUNTER — PATIENT OUTREACH (OUTPATIENT)
Dept: HEALTH INFORMATION MANAGEMENT | Facility: OTHER | Age: 70
End: 2020-07-30

## 2020-09-14 ENCOUNTER — HOSPITAL ENCOUNTER (OUTPATIENT)
Dept: LAB | Facility: MEDICAL CENTER | Age: 70
End: 2020-09-14
Attending: INTERNAL MEDICINE
Payer: MEDICARE

## 2020-09-14 DIAGNOSIS — I65.23 BILATERAL CAROTID ARTERY STENOSIS: ICD-10-CM

## 2020-09-14 DIAGNOSIS — I48.0 PAROXYSMAL ATRIAL FIBRILLATION (HCC): ICD-10-CM

## 2020-09-14 DIAGNOSIS — E11.9 TYPE 2 DIABETES MELLITUS WITHOUT COMPLICATION, WITHOUT LONG-TERM CURRENT USE OF INSULIN (HCC): ICD-10-CM

## 2020-09-14 DIAGNOSIS — E78.2 MIXED HYPERLIPIDEMIA: ICD-10-CM

## 2020-09-14 DIAGNOSIS — C54.1 MALIGNANT NEOPLASM OF ENDOMETRIUM (HCC): ICD-10-CM

## 2020-09-14 DIAGNOSIS — E55.9 VITAMIN D DEFICIENCY: ICD-10-CM

## 2020-09-14 LAB
25(OH)D3 SERPL-MCNC: 69 NG/ML (ref 30–100)
ALBUMIN SERPL BCP-MCNC: 3.9 G/DL (ref 3.2–4.9)
ALBUMIN/GLOB SERPL: 1.4 G/DL
ALP SERPL-CCNC: 84 U/L (ref 30–99)
ALT SERPL-CCNC: 12 U/L (ref 2–50)
ANION GAP SERPL CALC-SCNC: 10 MMOL/L (ref 7–16)
AST SERPL-CCNC: 12 U/L (ref 12–45)
BASOPHILS # BLD AUTO: 1 % (ref 0–1.8)
BASOPHILS # BLD: 0.05 K/UL (ref 0–0.12)
BILIRUB SERPL-MCNC: 0.7 MG/DL (ref 0.1–1.5)
BUN SERPL-MCNC: 14 MG/DL (ref 8–22)
CALCIUM SERPL-MCNC: 9.5 MG/DL (ref 8.5–10.5)
CHLORIDE SERPL-SCNC: 103 MMOL/L (ref 96–112)
CHOLEST SERPL-MCNC: 183 MG/DL (ref 100–199)
CO2 SERPL-SCNC: 26 MMOL/L (ref 20–33)
CREAT SERPL-MCNC: 0.73 MG/DL (ref 0.5–1.4)
CREAT UR-MCNC: 100.84 MG/DL
EOSINOPHIL # BLD AUTO: 0.12 K/UL (ref 0–0.51)
EOSINOPHIL NFR BLD: 2.4 % (ref 0–6.9)
ERYTHROCYTE [DISTWIDTH] IN BLOOD BY AUTOMATED COUNT: 47.6 FL (ref 35.9–50)
EST. AVERAGE GLUCOSE BLD GHB EST-MCNC: 128 MG/DL
FASTING STATUS PATIENT QL REPORTED: NORMAL
GLOBULIN SER CALC-MCNC: 2.7 G/DL (ref 1.9–3.5)
GLUCOSE SERPL-MCNC: 124 MG/DL (ref 65–99)
HBA1C MFR BLD: 6.1 % (ref 0–5.6)
HCT VFR BLD AUTO: 45.8 % (ref 37–47)
HDLC SERPL-MCNC: 60 MG/DL
HGB BLD-MCNC: 14.6 G/DL (ref 12–16)
IMM GRANULOCYTES # BLD AUTO: 0.02 K/UL (ref 0–0.11)
IMM GRANULOCYTES NFR BLD AUTO: 0.4 % (ref 0–0.9)
LDLC SERPL CALC-MCNC: 96 MG/DL
LYMPHOCYTES # BLD AUTO: 1.29 K/UL (ref 1–4.8)
LYMPHOCYTES NFR BLD: 26.2 % (ref 22–41)
MCH RBC QN AUTO: 29.9 PG (ref 27–33)
MCHC RBC AUTO-ENTMCNC: 31.9 G/DL (ref 33.6–35)
MCV RBC AUTO: 93.7 FL (ref 81.4–97.8)
MICROALBUMIN UR-MCNC: <1.2 MG/DL
MICROALBUMIN/CREAT UR: NORMAL MG/G (ref 0–30)
MONOCYTES # BLD AUTO: 0.5 K/UL (ref 0–0.85)
MONOCYTES NFR BLD AUTO: 10.2 % (ref 0–13.4)
NEUTROPHILS # BLD AUTO: 2.94 K/UL (ref 2–7.15)
NEUTROPHILS NFR BLD: 59.8 % (ref 44–72)
NRBC # BLD AUTO: 0 K/UL
NRBC BLD-RTO: 0 /100 WBC
PLATELET # BLD AUTO: 298 K/UL (ref 164–446)
PMV BLD AUTO: 9.7 FL (ref 9–12.9)
POTASSIUM SERPL-SCNC: 4.5 MMOL/L (ref 3.6–5.5)
PROT SERPL-MCNC: 6.6 G/DL (ref 6–8.2)
RBC # BLD AUTO: 4.89 M/UL (ref 4.2–5.4)
SODIUM SERPL-SCNC: 139 MMOL/L (ref 135–145)
TRIGL SERPL-MCNC: 136 MG/DL (ref 0–149)
WBC # BLD AUTO: 4.9 K/UL (ref 4.8–10.8)

## 2020-09-14 PROCEDURE — 80053 COMPREHEN METABOLIC PANEL: CPT

## 2020-09-14 PROCEDURE — 82043 UR ALBUMIN QUANTITATIVE: CPT

## 2020-09-14 PROCEDURE — 36415 COLL VENOUS BLD VENIPUNCTURE: CPT

## 2020-09-14 PROCEDURE — 80061 LIPID PANEL: CPT

## 2020-09-14 PROCEDURE — 85025 COMPLETE CBC W/AUTO DIFF WBC: CPT

## 2020-09-14 PROCEDURE — 82570 ASSAY OF URINE CREATININE: CPT

## 2020-09-14 PROCEDURE — 83036 HEMOGLOBIN GLYCOSYLATED A1C: CPT

## 2020-09-14 PROCEDURE — 82306 VITAMIN D 25 HYDROXY: CPT

## 2020-09-28 ENCOUNTER — OFFICE VISIT (OUTPATIENT)
Dept: MEDICAL GROUP | Facility: MEDICAL CENTER | Age: 70
End: 2020-09-28
Payer: MEDICARE

## 2020-09-28 VITALS
HEART RATE: 63 BPM | WEIGHT: 175.27 LBS | OXYGEN SATURATION: 96 % | HEIGHT: 63 IN | TEMPERATURE: 97.3 F | DIASTOLIC BLOOD PRESSURE: 60 MMHG | BODY MASS INDEX: 31.05 KG/M2 | SYSTOLIC BLOOD PRESSURE: 120 MMHG

## 2020-09-28 DIAGNOSIS — E55.9 VITAMIN D DEFICIENCY: ICD-10-CM

## 2020-09-28 DIAGNOSIS — E11.9 TYPE 2 DIABETES MELLITUS WITHOUT COMPLICATION, WITHOUT LONG-TERM CURRENT USE OF INSULIN (HCC): ICD-10-CM

## 2020-09-28 DIAGNOSIS — E78.2 MIXED HYPERLIPIDEMIA: ICD-10-CM

## 2020-09-28 DIAGNOSIS — M85.852 OSTEOPENIA OF LEFT HIP: ICD-10-CM

## 2020-09-28 PROCEDURE — 99214 OFFICE O/P EST MOD 30 MIN: CPT | Performed by: INTERNAL MEDICINE

## 2020-09-28 ASSESSMENT — FIBROSIS 4 INDEX: FIB4 SCORE: 0.84

## 2020-09-28 NOTE — ASSESSMENT & PLAN NOTE
Chronic ongoing problem.  Patient declines pharmacotherapy with antiresorptive's at this time.  Her T-scores are still in the osteopenia realm and she is not sure why her calculated FRAX is so high.  She is optimize her calcium and vitamin D in the meantime and will increase her weightbearing exercise.  Discussed with her that if she would sustain a fracture in the next 2 years we would need to start prescription medicine and she voices understanding.

## 2020-09-28 NOTE — ASSESSMENT & PLAN NOTE
Previous problem, resolved.  Vitamin D levels are sufficient.  She should stay on vitamin D 2000 international units daily due to history of osteopenia.

## 2020-09-28 NOTE — ASSESSMENT & PLAN NOTE
Chronic and stable problem.  Continue lovastatin 40 mg daily.  All of her cholesterol values are in the normal parameter.

## 2020-09-28 NOTE — PROGRESS NOTES
Subjective:   Chief Complaint/History of Present Illness:  Cristela Alan is a 70 y.o. female established patient who presents today to discuss medical problems as listed below. She is accompanied by her , Art.    Problem   Osteopenia of left hip with elevated FRAX    Bone Density (6/2020):  distal left forearm T score of -0.8  proximal left femur T score of -1.7 (-1.1 in 2015)     When compared with the most recent study dated 5/26/2016, there has been a 9.0% decrease in the bone mineral density of the left femur.    10-year Probability of Fracture:  Major Osteoporotic     31.8%  Hip     12.4%    She has a history of osteopenia on prior bone density.  However, reviewing her FRAX she actually met criteria for initiation of antiresorptive therapy.  Patient understands that she meets the treatment threshold based on her FRAX but would like to hold off on starting pharmacotherapy at this time and would rather follow up DEXA in 2022.  She denies any history of fragility fractures (fractures of wrists in 1970's and hip dislocation without fracture in 2004).  She is taking calcium 1200 mg daily and vitamin D 2,000 IU daily.      Type 2 Diabetes Mellitus Without Complication, Without Long-Term Current Use of Insulin ([facility])       Ref. Range 5/30/2019 08:21 9/14/2020 07:58   Glycohemoglobin Latest Ref Range: 0.0 - 5.6 % 6.6 (H) 6.1 (H)   Estim. Avg Glu Latest Units: mg/dL 143 128      Ref. Range 9/14/2020 07:58   Cholesterol,Tot Latest Ref Range: 100 - 199 mg/dL 183   Triglycerides Latest Ref Range: 0 - 149 mg/dL 136   HDL Latest Ref Range: >=40 mg/dL 60   LDL Latest Ref Range: <100 mg/dL 96   Micro Alb Creat Ratio Latest Ref Range: 0 - 30 mg/g see below   Creatinine, Urine Latest Units: mg/dL 100.84   Microalbumin, Urine Random Latest Units: mg/dL <1.2      Ref. Range 9/14/2020 07:58   Bun Latest Ref Range: 8 - 22 mg/dL 14   Creatinine Latest Ref Range: 0.50 - 1.40 mg/dL 0.73   GFR If Non  Latest Ref  Range: >60 mL/min/1.73 m 2 >60     She technically met criteria for type 2 diabetes as of May 2019.  Does not appear this diagnosis was ever given to the patient.  She is never taken any glucose lowering medications.  She is on statin therapy for history of elevated cholesterol mini stroke and she is also on ACE inhibitor for history of hypertension.  No known history of retinopathy, nephropathy, neuropathy but will require additional evaluation.  Patient admits that she does not follow a low sugar low-carb diet at this time.     Vitamin D Deficiency       Ref. Range 9/14/2016 7/19/2018 9/14/2020   25-Hydroxy   Vitamin D 25 Latest Ref Range: 30 - 100 ng/mL 47 48 69     She has prior history of vitamin D deficiency, currently sufficient.  She also has osteopenia on vitamin D replacement.  No known history of parathyroid disease or chronic kidney disease.     Hyperlipidemia       Ref. Range 5/30/2019 08:21 9/14/2020 07:58   Cholesterol,Tot Latest Ref Range: 100 - 199 mg/dL 179 183   Triglycerides Latest Ref Range: 0 - 149 mg/dL 132 136   HDL Latest Ref Range: >=40 mg/dL 56 60   LDL Latest Ref Range: <100 mg/dL 97 96       She has history of elevated cholesterol in the past.  She is taking lovastatin which will be an ongoing recommendation due to history of mini strokes, atrial fibrillation, and impaired fasting glucose.  No myalgias or GI upset on this medication.          Additional History:   Allergies:    Fentanyl; Flu virus vaccine; Versed; Bee; Codeine; Eggs; Lidocaine hcl; Other drug; Penicillins; Soap; Tape; and Xylocaine [lidocaine hcl, local anesth.]     Current Medications:     Current Outpatient Medications   Medication Sig Dispense Refill   • lisinopril (PRINIVIL) 5 MG Tab Take 1 Tab by mouth every day. 100 Tab 3   • lovastatin (MEVACOR) 40 MG tablet Take 1 Tab by mouth every day. 100 Tab 2   • metoprolol (LOPRESSOR) 25 MG Tab TAKE ONE TABLET BY MOUTH TWICE DAILY 200 Tab 3   • dabigatran (PRADAXA) 150 MG  "Cap capsule Take 1 Cap by mouth 2 Times a Day. TWICE DAILY 180 Cap 3   • Multiple Vitamins-Minerals (PRESERVISION AREDS PO) Take  by mouth.     • Cholecalciferol (VITAMIN D HIGH POTENCY PO) Take 2,000 mg by mouth every day.     • Polyethylene Glycol 400 (BLINK TEARS OP) by Ophthalmic route every day.     • Calcium 600 MG TABS Take 1 Tab by mouth every morning. Taking 1000mg  Indications: per pt is taking 500 mg     • multivitamin (THERAGRAN) TABS Take 1 Tab by mouth every morning.       • Polyethyl Glycol-Propyl Glycol (SYSTANE OP) Place 1 Drop in both eyes every bedtime. Indications: **OTC**       No current facility-administered medications for this visit.         Social History:     Social History     Tobacco Use   • Smoking status: Never Smoker   • Smokeless tobacco: Never Used   • Tobacco comment: continued abstinence   Substance Use Topics   • Alcohol use: No   • Drug use: No       ROS: As above in the HPI      Objective:   Physical Exam:    Vitals: /60   Pulse 63   Temp 36.3 °C (97.3 °F) (Temporal)   Ht 1.6 m (5' 3\")   Wt 79.5 kg (175 lb 4.3 oz)   SpO2 96%    BMI: Body mass index is 31.05 kg/m².   General/Constitutional: Vitals as above, Well nourished, well developed female in no acute distress   Head/Eyes: Head is grossly normal & atraumatic, bilateral conjunctivae clear and not injected, bilateral EOMI   ENT: Bilateral external ears grossly normal in appearance, Hearing grossly intact, External nares normal in appearance and without discharge/bleeding   Respiratory: No respiratory distress, bilateral lungs are clear to ausculation in all lung fields, no wheezing/rhonchi/rales   Cardiovascular: Regular rate and rhythm without murmur/rubs, distal pulses are intact and equal bilaterally (radial), no bilateral lower extremity edema   MSK: Gait grossly normal & not antalgic. Intermittent pain and swelling at MCP joint line, stable today.   Integumentary: No apparent rashes on skin exposed " areas.   Psych: Judgment grossly appropriate, no apparent depression/anxiety    Health Maintenance:     - Completed      Assessment and Plan:     Problem List Items Addressed This Visit     Vitamin D deficiency     Previous problem, resolved.  Vitamin D levels are sufficient.  She should stay on vitamin D 2000 international units daily due to history of osteopenia.         Hyperlipidemia     Chronic and stable problem.  Continue lovastatin 40 mg daily.  All of her cholesterol values are in the normal parameter.         Osteopenia of left hip with elevated FRAX     Chronic ongoing problem.  Patient declines pharmacotherapy with antiresorptive's at this time.  Her T-scores are still in the osteopenia realm and she is not sure why her calculated FRAX is so high.  She is optimize her calcium and vitamin D in the meantime and will increase her weightbearing exercise.  Discussed with her that if she would sustain a fracture in the next 2 years we would need to start prescription medicine and she voices understanding.         Type 2 diabetes mellitus without complication, without long-term current use of insulin (HCC)     Previous problem, improved. A1c down to the prediabetic range. No prior use of glucose lowering medications. She is on statin for history of hyperlipidemia. No microalbuminuria. Continue to monitor periodically but no indication for starting new medicine at this time.                  RTC: 6 months.    PLEASE NOTE: This dictation was created using voice recognition software. I have made every reasonable attempt to correct obvious errors, but I expect that there are errors of grammar and possibly content that I did not discover before finalizing the note.

## 2020-09-28 NOTE — ASSESSMENT & PLAN NOTE
Previous problem, improved. A1c down to the prediabetic range. No prior use of glucose lowering medications. She is on statin for history of hyperlipidemia. No microalbuminuria. Continue to monitor periodically but no indication for starting new medicine at this time.

## 2020-10-26 DIAGNOSIS — I48.91 ATRIAL FIBRILLATION, UNSPECIFIED TYPE (HCC): ICD-10-CM

## 2020-10-27 RX ORDER — ATENOLOL 100 MG/1
TABLET ORAL
Qty: 180 CAP | Refills: 0 | Status: SHIPPED | OUTPATIENT
Start: 2020-10-27 | End: 2020-11-19 | Stop reason: SDUPTHER

## 2020-11-04 ENCOUNTER — TELEPHONE (OUTPATIENT)
Dept: CARDIOLOGY | Facility: MEDICAL CENTER | Age: 70
End: 2020-11-04

## 2020-11-04 NOTE — TELEPHONE ENCOUNTER
Faxed additional info needed for Pradaxa PA to [facility] at [phone]  Confirmation fax received.

## 2020-11-17 ENCOUNTER — OFFICE VISIT (OUTPATIENT)
Dept: CARDIOLOGY | Facility: MEDICAL CENTER | Age: 70
End: 2020-11-17
Payer: MEDICARE

## 2020-11-17 VITALS
DIASTOLIC BLOOD PRESSURE: 70 MMHG | RESPIRATION RATE: 12 BRPM | OXYGEN SATURATION: 96 % | HEART RATE: 86 BPM | SYSTOLIC BLOOD PRESSURE: 112 MMHG | WEIGHT: 176.6 LBS | HEIGHT: 63 IN | BODY MASS INDEX: 31.29 KG/M2

## 2020-11-17 DIAGNOSIS — I10 ESSENTIAL (PRIMARY) HYPERTENSION: ICD-10-CM

## 2020-11-17 DIAGNOSIS — I48.20 CHRONIC ATRIAL FIBRILLATION (HCC): ICD-10-CM

## 2020-11-17 DIAGNOSIS — E78.5 DYSLIPIDEMIA: ICD-10-CM

## 2020-11-17 DIAGNOSIS — Z79.01 CHRONIC ANTICOAGULATION: ICD-10-CM

## 2020-11-17 DIAGNOSIS — G45.9 TRANSIENT ISCHEMIC ATTACK: ICD-10-CM

## 2020-11-17 PROCEDURE — 99214 OFFICE O/P EST MOD 30 MIN: CPT | Performed by: INTERNAL MEDICINE

## 2020-11-17 RX ORDER — LOVASTATIN 40 MG/1
40 TABLET ORAL DAILY
Qty: 180 TAB | Refills: 3 | Status: SHIPPED | OUTPATIENT
Start: 2020-11-17 | End: 2020-11-19 | Stop reason: SDUPTHER

## 2020-11-17 ASSESSMENT — ENCOUNTER SYMPTOMS
RESPIRATORY NEGATIVE: 1
NERVOUS/ANXIOUS: 0
EYES NEGATIVE: 1
VOMITING: 0
COUGH: 0
WEIGHT LOSS: 0
NEUROLOGICAL NEGATIVE: 1
MYALGIAS: 0
ABDOMINAL PAIN: 0
FEVER: 0
WEAKNESS: 0
GASTROINTESTINAL NEGATIVE: 1
BRUISES/BLEEDS EASILY: 0
NAUSEA: 0
PALPITATIONS: 1
HEADACHES: 0
BLURRED VISION: 0
SHORTNESS OF BREATH: 0
CONSTITUTIONAL NEGATIVE: 1
CLAUDICATION: 0
PSYCHIATRIC NEGATIVE: 1
DOUBLE VISION: 0
CHILLS: 0
DIZZINESS: 0
DEPRESSION: 0
FOCAL WEAKNESS: 0
MUSCULOSKELETAL NEGATIVE: 1

## 2020-11-17 ASSESSMENT — FIBROSIS 4 INDEX: FIB4 SCORE: 0.84

## 2020-11-18 ENCOUNTER — TELEPHONE (OUTPATIENT)
Dept: CARDIOLOGY | Facility: MEDICAL CENTER | Age: 70
End: 2020-11-18

## 2020-11-18 DIAGNOSIS — E78.5 DYSLIPIDEMIA: ICD-10-CM

## 2020-11-19 ENCOUNTER — TELEPHONE (OUTPATIENT)
Dept: CARDIOLOGY | Facility: MEDICAL CENTER | Age: 70
End: 2020-11-19

## 2020-11-19 DIAGNOSIS — I48.91 ATRIAL FIBRILLATION, UNSPECIFIED TYPE (HCC): ICD-10-CM

## 2020-11-19 RX ORDER — LOVASTATIN 40 MG/1
80 TABLET ORAL DAILY
Qty: 180 TAB | Refills: 3 | Status: SHIPPED
Start: 2020-11-19 | End: 2021-03-24

## 2020-11-19 RX ORDER — DABIGATRAN ETEXILATE 150 MG/1
150 CAPSULE ORAL 2 TIMES DAILY
Qty: 180 CAP | Refills: 3 | Status: SHIPPED
Start: 2020-11-19 | End: 2021-04-19

## 2020-11-19 NOTE — TELEPHONE ENCOUNTER
"FW: refill  Received: Today  Previous Messages    ----- Message -----   From: Bonita Coombs, Med Ass't   Sent: 2020   1:23 PM PST   To: Benny Duke/Rohan   Subject: refill                                           JI     TO: 12:30p/Wed/Office   NM: Cristela Dayanna    PH: [phone]    PT NM: DayannaCristela    : 46    REG DR: Dr Avery    RE: Rx for Lovastatin has the wrong   dosage. Should reflect enough for   patient to double Rx per doctor.        -------------------------------------------------------------------------------  Per Dr. Avery's progress note :  \"Hyperlipidemia: She is doing well on statin therapy without myalgia symptoms. We will increase her lovastatin and repeat labs.\"    Pt previously on 40mg lovastatin QD    To Dr. Avery to clarify increased dose    "

## 2020-11-19 NOTE — TELEPHONE ENCOUNTER
Message  Received: Today  Message Contents   RANDI Tavera R.N.   Caller: Unspecified (Yesterday,  4:49 PM)             Please increase lovastatin to 80 mg QD. Thanks.  LINDSEY.      ----------------------------------------------------------------    Lovastatin rx adjusted in med rec and sent to pharmacy per Dr. Avery.

## 2020-11-21 ENCOUNTER — HOSPITAL ENCOUNTER (OUTPATIENT)
Dept: RADIOLOGY | Facility: MEDICAL CENTER | Age: 70
End: 2020-11-21
Attending: INTERNAL MEDICINE
Payer: MEDICARE

## 2020-11-21 DIAGNOSIS — Z12.31 VISIT FOR SCREENING MAMMOGRAM: ICD-10-CM

## 2020-11-21 PROCEDURE — 77067 SCR MAMMO BI INCL CAD: CPT

## 2020-11-29 NOTE — RESULT ENCOUNTER NOTE
Med Archibald,    I have your mammogram results and they look reassuring overall. The radiologist recommends repeating again in 1 year or sooner if you develop any new or concerning breast symptoms.    Best,  Dr. Bess

## 2020-12-01 NOTE — PROGRESS NOTES
"Chief Complaint   Patient presents with   • Atrial Fibrillation     dx: PAF       Subjective:   Cristela Alan is a 70 y.o. female who presents today for annual follow up of atrial fibrillation on chronic anticoagulation therapy.    Since the patient's last visit on 10/23/19, she has been doing well clinically. She has been experiencing more palpitations. She denies chest pain, shortness of breath, palpitations, nausea/vomiting or diaphoresis. She is considering changing her NOAC for cost issues.    Past Medical History:   Diagnosis Date   • Allergic rhinitis    • Anesthesia     hypotension   • Atrial fibrillation (HCC)    • Cancer (HCC) 2014    endometrial   • CATARACT     bilateral lens implants   • Cholesterol blood decreased    • Dental disorder     dentures   • Dermatitis, contact    • Dyslipidemia 6/30/2016   • Encounter for general adult medical examination without abnormal findings 12/2/2014    Alexia for GI No flu shots- egg allergies  Declines all vaccines- had reaction to tdap   • Endometrium cancer (HCC)    • Hyperlipidemia    • Hypertension    • Impaired glucose tolerance    • Macular degeneration    • Obesity    • Pneumonia     had PNU 35 years ago   • Prolapsed uterus 8/2014    current issue   • Pulmonary nodule 11/9/2016    No history of tobacco use CT 9/16, 12/16- no change-6/17- ? New nodule>>PET CT-10/17 neg    • Stroke (HCC)     \"mini strokes\" TIA, no residual   • TIA (transient ischemic attack)    • Unspecified hemorrhagic conditions     NOSE BLEEDS/on Plavix   • Vitamin D deficiency      Past Surgical History:   Procedure Laterality Date   • HYSTERECTOMY ROBOTIC XI  8/28/2014    Performed by Pantera Wylie M.D. at SURGERY [facility]   • LYMPH NODE DISSECTION ROBOTIC XI  8/28/2014    Performed by Pantera Wylie M.D. at SURGERY [facility]   • CATARACT PHACO WITH IOL  8/19/2010    Performed by DAVE AGUILAR at SURGERY SAME DAY [facility]   • CATARACT PHACO WITH IOL  8/5/2010    Performed " "by DAVE AGUILAR at SURGERY SAME DAY [facility]S   • HIP ARTHROSCOPY  5/12/08    Performed by ZINA HOLLIS at SURGERY [facility]S   • ACETABULAR OSTEOTOMY  5/12/08    Performed by ZINA HOLLIS at SURGERY [facility] OR[facility]   • ORTHOPEDIC OSTEOTOMY  5/12/08    Performed by ZINA HOLLIS at SURGERY [facility] ORS   • NASAL POLYPECTOMY  1994    \"removal of papilloma\"   • ABDOMINAL HYSTERECTOMY TOTAL     • EYE SURGERY       Family History   Problem Relation Age of Onset   • Hyperlipidemia Mother    • Hypertension Mother    • Cancer Mother    • No Known Problems Father    • Heart Disease Sister    • Alcohol/Drug Sister    • Hypertension Sister    • Heart Attack Sister    • Cancer Sister 66        ? breast cancer as well   • Hypertension Sister    • Hyperlipidemia Sister    • Cancer Other    • Hypertension Other    • Diabetes Maternal Grandmother         elderly   • Other Sister         suicide    • Stroke Neg Hx      Social History     Socioeconomic History   • Marital status:      Spouse name: Not on file   • Number of children: Not on file   • Years of education: Not on file   • Highest education level: Not on file   Occupational History   • Not on file   Social Needs   • Financial resource strain: Not on file   • Food insecurity     Worry: Not on file     Inability: Not on file   • Transportation needs     Medical: Not on file     Non-medical: Not on file   Tobacco Use   • Smoking status: Never Smoker   • Smokeless tobacco: Never Used   • Tobacco comment: continued abstinence   Substance and Sexual Activity   • Alcohol use: No   • Drug use: No   • Sexual activity: Not Currently   Lifestyle   • Physical activity     Days per week: Not on file     Minutes per session: Not on file   • Stress: Not on file   Relationships   • Social connections     Talks on phone: Not on file     Gets together: Not on file     Attends Mosque service: Not on file     Active member of club or organization: Not " "on file     Attends meetings of clubs or organizations: Not on file     Relationship status: Not on file   • Intimate partner violence     Fear of current or ex partner: Not on file     Emotionally abused: Not on file     Physically abused: Not on file     Forced sexual activity: Not on file   Other Topics Concern   • Not on file   Social History Narrative    She is  to Art for 49 years, they met at a [place] [place]. She has a daughter Nikki (43, [place]) and she has 2 kids. She retired in her late 60's. 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     Allergies   Allergen Reactions   • Fentanyl Anaphylaxis   • Flu Virus Vaccine    • Versed Shortness of Breath and Vomiting   • Bee      And  spider--- tongue swelling   • Codeine      Increased heart rate   • Eggs Vomiting   • Lidocaine Hcl      \"severe drop in BP\"   • Other Drug      Novocaine hypotension ALL \"MELISA\"   • Penicillins      Increased heart rate   • Soap Rash     Laundry Tide soap, Dove and Ivory soap   • Tape Rash     Paper tape OK   • Xylocaine [Lidocaine Hcl, Local Anesth.]      All \"MELISA\" hypotension     (Medications reviewed.)  Outpatient Encounter Medications as of 11/17/2020   Medication Sig Dispense Refill   • PRADAXA 150 MG Cap capsule TAKE ONE CAPSULE BY MOUTH TWICE DAILY 180 Cap 0   • lisinopril (PRINIVIL) 5 MG Tab Take 1 Tab by mouth every day. 100 Tab 3   • lovastatin (MEVACOR) 40 MG tablet Take 1 Tab by mouth every day. 100 Tab 2   • metoprolol (LOPRESSOR) 25 MG Tab TAKE ONE TABLET BY MOUTH TWICE DAILY 200 Tab 3   • Multiple Vitamins-Minerals (PRESERVISION AREDS PO) Take  by mouth.     • Cholecalciferol (VITAMIN D HIGH POTENCY PO) Take 2,000 mg by mouth every day.     • Polyethylene Glycol 400 (BLINK TEARS OP) by Ophthalmic route every day.     • Calcium 600 MG TABS Take 1 Tab by mouth every morning. Taking 1000mg  Indications: per pt is taking 500 mg     • multivitamin (THERAGRAN) TABS Take 1 Tab by mouth every morning.     " "  • Polyethyl Glycol-Propyl Glycol (SYSTANE OP) Place 1 Drop in both eyes every bedtime. Indications: **OTC**       No facility-administered encounter medications on file as of 11/17/2020.      Review of Systems   Constitutional: Negative.  Negative for chills, fever, malaise/fatigue and weight loss.   HENT: Negative.  Negative for hearing loss.    Eyes: Negative.  Negative for blurred vision and double vision.   Respiratory: Negative.  Negative for cough and shortness of breath.    Cardiovascular: Positive for palpitations. Negative for chest pain, claudication and leg swelling.   Gastrointestinal: Negative.  Negative for abdominal pain, nausea and vomiting.   Genitourinary: Negative.  Negative for dysuria and urgency.   Musculoskeletal: Negative.  Negative for joint pain and myalgias.   Skin: Negative.  Negative for itching and rash.   Neurological: Negative.  Negative for dizziness, focal weakness, weakness and headaches.   Endo/Heme/Allergies: Negative.  Does not bruise/bleed easily.   Psychiatric/Behavioral: Negative.  Negative for depression. The patient is not nervous/anxious.         Objective:   /70 (BP Location: Left arm, Patient Position: Sitting, BP Cuff Size: Adult)   Pulse 86   Resp 12   Ht 1.6 m (5' 3\")   Wt 80.1 kg (176 lb 9.6 oz)   SpO2 96%   BMI 31.28 kg/m²     Physical Exam   Constitutional: She is oriented to person, place, and time. She appears well-developed and well-nourished.   HENT:   Head: Normocephalic and atraumatic.   Eyes: EOM are normal.   Neck: No JVD present.   Cardiovascular: Normal rate, regular rhythm and normal heart sounds.   Pulmonary/Chest: Effort normal and breath sounds normal.   Abdominal: Soft. Bowel sounds are normal.   No hepatosplenomegaly.   Musculoskeletal: Normal range of motion.   Lymphadenopathy:     She has no cervical adenopathy.   Neurological: She is alert and oriented to person, place, and time.   Skin: Skin is warm and dry.   Psychiatric: She has " a normal mood and affect.     CARDIAC STUDIES/PROCEDURES:     CAROTID ULTRASOUND (06/18/18)  Mild bilateral internal carotid artery stenosis (<50%).     ECHOCARDIOGRAM CONCLUSIONS (06/18/18)  Compared to the images of the prior study.   Normal left ventricular systolic function.  Left ventricular ejection fraction is visually estimated to be 75%.  Grade II diastolic dysfunction.  No significant valve abnormalities.      ECHOCARDIOGRAM CONCLUSIONS (12/05/12)  Technically difficult study  Normal left ventricular size, thickness, systolic function, and diastolic function.  Left ventricular ejection fraction is 55% to 60%.  Structurally normal mitral valve.  No stenosis or regurgitation seen.  Structurally normal aortic valve.  No stenosis or regurgitation seen.  Structurally normal tricuspid valve.  Trace tricuspid regurgitation.      EKG performed on (08/24/14) EKG shows normal sinus rhythm.  EKG performed on (07/30/14) EKG shows normal sinus rhythm.  EKG performed on (12/06/12) EKG shows normal sinus rhythm.  EKG performed on (12/05/12) EKG shows atrial fibrillation.     Laboratory results of (09/14/20) were reviewed. Cholesterol profile of 183/136/60/96 mg/dl noted.  Laboratory results of (05/30/19) Cholesterol profile of 179/132/56/97 noted.  Laboratory results of (06/12/17) Cholesterol profile of 169/137/54/88 noted.  Laboratory results of (09/14/16) Cholesterol profile of 151/103/55/75 noted.  Laboratory results of (06/01/15) Cholesterol profile of 190/150/54/106 noted.     MYOCARDIAL PERFUSION STUDY CONCLUSIONS (06/18/18)  No evidence of significant jeopardized viable myocardium or prior myocardial infarction.  Normal left ventricular size, ejection fraction, and wall motion.  ECG INTERPRETATION  Negative stress ECG for ischemia.     MPI CONCLUSIONS (06/07/06)  NORMAL MYOCARDIAL PERFUSION IMAGES WITH NO EVIDENCE OF SIGNIFICANT   ISCHEMIA OR INFARCTION.    Assessment:     1. Chronic atrial fibrillation (HCC)      2. Chronic anticoagulation     3. Essential (primary) hypertension     4. Transient ischemic attack       Medical Decision Making:  Today's Assessment / Status / Plan:     1. Atrial fibrillation on anticoagulation therapy (Pradaxa): She is experiencing palpitations. We will refer to electrophysiologisy service.  2. Hypertension: Blood pressure is well controlled. We will continue with metoprolol and lisinopril.  3. Hyperlipidemia: She is doing well on statin therapy without myalgia symptoms. We will increase her lovastatin and repeat labs.  4. Carotid artery stenosis: Clinically stable on above medical therapy.  5. History of trans-ischemic attack (2000): She remains clinically stable without any new neurological symptoms.  6. Health maintenance: She will rediscuss surveillance for pulmonary nodules with her primary care physician. She underwent surgery for uterine cancer (08/27/14).     We will follow up the patient in one year.    CC Temitope Mccullough      per HPI right shoulder

## 2020-12-04 ENCOUNTER — OFFICE VISIT (OUTPATIENT)
Dept: CARDIOLOGY | Facility: MEDICAL CENTER | Age: 70
End: 2020-12-04
Payer: MEDICARE

## 2020-12-04 VITALS
RESPIRATION RATE: 14 BRPM | HEIGHT: 63 IN | SYSTOLIC BLOOD PRESSURE: 122 MMHG | OXYGEN SATURATION: 99 % | WEIGHT: 174 LBS | HEART RATE: 78 BPM | DIASTOLIC BLOOD PRESSURE: 84 MMHG | BODY MASS INDEX: 30.83 KG/M2

## 2020-12-04 DIAGNOSIS — I48.0 PAROXYSMAL ATRIAL FIBRILLATION (HCC): ICD-10-CM

## 2020-12-04 LAB — EKG IMPRESSION: NORMAL

## 2020-12-04 PROCEDURE — 99203 OFFICE O/P NEW LOW 30 MIN: CPT | Performed by: INTERNAL MEDICINE

## 2020-12-04 PROCEDURE — 93000 ELECTROCARDIOGRAM COMPLETE: CPT | Performed by: INTERNAL MEDICINE

## 2020-12-04 ASSESSMENT — FIBROSIS 4 INDEX: FIB4 SCORE: 0.84

## 2020-12-05 NOTE — PROGRESS NOTES
Arrhythmia Clinic Note (New patient)     DOS: 12/4/2020    Referring physician: Dr Avery    Chief complaint/Reason for consult: Palpitations    HPI: 70-year-old female with prior history of paroxysmal atrial fibrillation diagnosed 12 years ago presenting for follow-up.  She is managed chronically on metoprolol and Pradaxa.  She first saw Dr. Avery in 2012 after presentation to the emergency department with atrial fibrillation and RVR.  She had a synchronized cardioversion restoring regular rhythm.  She did well in the following years without recurrence of sustained atrial fibrillation.  However, she did note palpitations every now and again.  She still notes these palpitations, they happen several times per week, they are not too bothersome for her but she does note them, usually last a few minutes or so.  She denies syncope or presyncope.  No shortness of breath on exertion.  No chest pain on exertion.    ROS (+ highlighted in bold):  Constitutional: Fevers/chills/fatigue/weightloss  HEENT: Blurry vision/eye pain/sore throat/hearing loss  Respiratory: Shortness of breath/cough  Cardiovascular: Chest pain/palpitations/edema/orthopnea/syncope  GI: Nausea/vomitting/diarrhea  MSK: Arthralgias/myagias/muscle weakness  Skin: Rash/sores  Neurological: Numbness/tremors/vertigo  Endocrine: Excessive thirst/polyuria/cold intolerance/heat intolerance  Psych: Depression/anxiety    Past Medical History:   Diagnosis Date   • Allergic rhinitis    • Anesthesia     hypotension   • Atrial fibrillation (HCC)    • Cancer (HCC) 2014    endometrial   • CATARACT     bilateral lens implants   • Cholesterol blood decreased    • Dental disorder     dentures   • Dermatitis, contact    • Dyslipidemia 6/30/2016   • Encounter for general adult medical examination without abnormal findings 12/2/2014    [place] for GI No flu shots- egg allergies  Declines all vaccines- had reaction to tdap   • Endometrium cancer (HCC)    • Hyperlipidemia    •  "Hypertension    • Impaired glucose tolerance    • Macular degeneration    • Obesity    • Pneumonia     had PNU 35 years ago   • Prolapsed uterus 8/2014    current issue   • Pulmonary nodule 11/9/2016    No history of tobacco use CT 9/16, 12/16- no change-6/17- ? New nodule>>PET CT-10/17 neg    • Stroke (HCC)     \"mini strokes\" TIA, no residual   • TIA (transient ischemic attack)    • Unspecified hemorrhagic conditions     NOSE BLEEDS/on Plavix   • Vitamin D deficiency        Past Surgical History:   Procedure Laterality Date   • HYSTERECTOMY ROBOTIC XI  8/28/2014    Performed by Pantera Wylie M.D. at SURGERY [facility]   • LYMPH NODE DISSECTION ROBOTIC XI  8/28/2014    Performed by Pantera Wylie M.D. at SURGERY [facility]   • CATARACT PHACO WITH IOL  8/19/2010    Performed by DAVE AGUILAR at SURGERY SAME DAY [facility]   • CATARACT PHACO WITH IOL  8/5/2010    Performed by DAVE AGUILAR at SURGERY SAME DAY [facility]   • HIP ARTHROSCOPY  5/12/08    Performed by ZINA HOLLIS at SURGERY [facility]   • ACETABULAR OSTEOTOMY  5/12/08    Performed by ZINA HOLLIS at SURGERY [facility]   • ORTHOPEDIC OSTEOTOMY  5/12/08    Performed by ZINA HOLLIS at SURGERY [facility]   • NASAL POLYPECTOMY  1994    \"removal of papilloma\"   • ABDOMINAL HYSTERECTOMY TOTAL     • EYE SURGERY         Social History     Socioeconomic History   • Marital status:      Spouse name: Not on file   • Number of children: Not on file   • Years of education: Not on file   • Highest education level: Not on file   Occupational History   • Not on file   Social Needs   • Financial resource strain: Not on file   • Food insecurity     Worry: Not on file     Inability: Not on file   • Transportation needs     Medical: Not on file     Non-medical: Not on file   Tobacco Use   • Smoking status: Never Smoker   • Smokeless tobacco: Never Used   • Tobacco comment: continued abstinence   Substance and Sexual " "Activity   • Alcohol use: No   • Drug use: No   • Sexual activity: Not Currently   Lifestyle   • Physical activity     Days per week: Not on file     Minutes per session: Not on file   • Stress: Not on file   Relationships   • Social connections     Talks on phone: Not on file     Gets together: Not on file     Attends Rastafari service: Not on file     Active member of club or organization: Not on file     Attends meetings of clubs or organizations: Not on file     Relationship status: Not on file   • Intimate partner violence     Fear of current or ex partner: Not on file     Emotionally abused: Not on file     Physically abused: Not on file     Forced sexual activity: Not on file   Other Topics Concern   • Not on file   Social History Narrative    She is  to Art for 49 years, they met at a [employer]. She has a daughter Nikki (43, Tommy) and she has 2 kids. She retired in her late 60's. 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       Family History   Problem Relation Age of Onset   • Hyperlipidemia Mother    • Hypertension Mother    • Cancer Mother    • No Known Problems Father    • Heart Disease Sister    • Alcohol/Drug Sister    • Hypertension Sister    • Heart Attack Sister    • Cancer Sister 66        ? breast cancer as well   • Hypertension Sister    • Hyperlipidemia Sister    • Cancer Other    • Hypertension Other    • Diabetes Maternal Grandmother         elderly   • Other Sister         suicide    • Stroke Neg Hx        Allergies   Allergen Reactions   • Fentanyl Anaphylaxis   • Flu Virus Vaccine    • Versed Shortness of Breath and Vomiting   • Bee      And  spider--- tongue swelling   • Codeine      Increased heart rate   • Eggs Vomiting   • Lidocaine Hcl      \"severe drop in BP\"   • Other Drug      Novocaine hypotension ALL \"MELISA\"   • Penicillins      Increased heart rate   • Soap Rash     Laundry Tide soap, Dove and Ivory soap   • Tape Rash     Paper tape OK   • Xylocaine " "[Lidocaine Hcl, Local Anesth.]      All \"MELISA\" hypotension       Current Outpatient Medications   Medication Sig Dispense Refill   • dabigatran (PRADAXA) 150 MG Cap capsule Take 1 Cap by mouth 2 Times a Day. TWICE DAILY 180 Cap 3   • lovastatin (MEVACOR) 40 MG tablet Take 2 Tabs by mouth every day. 180 Tab 3   • lisinopril (PRINIVIL) 5 MG Tab Take 1 Tab by mouth every day. 100 Tab 3   • metoprolol (LOPRESSOR) 25 MG Tab TAKE ONE TABLET BY MOUTH TWICE DAILY 200 Tab 3   • Multiple Vitamins-Minerals (PRESERVISION AREDS PO) Take  by mouth.     • Cholecalciferol (VITAMIN D HIGH POTENCY PO) Take 2,000 mg by mouth every day.     • Polyethylene Glycol 400 (BLINK TEARS OP) by Ophthalmic route every day.     • Calcium 600 MG TABS Take 1 Tab by mouth every morning. Taking 1000mg  Indications: per pt is taking 500 mg     • multivitamin (THERAGRAN) TABS Take 1 Tab by mouth every morning.       • Polyethyl Glycol-Propyl Glycol (SYSTANE OP) Place 1 Drop in both eyes every bedtime. Indications: **OTC**       No current facility-administered medications for this visit.        Physical Exam:  Vitals:    12/04/20 1443   BP: 122/84   BP Location: Right arm   Patient Position: Sitting   BP Cuff Size: Adult   Pulse: 78   Resp: 14   SpO2: 99%   Weight: 78.9 kg (174 lb)   Height: 1.6 m (5' 3\")     General appearance: NAD, conversant   Eyes: anicteric sclerae, moist conjunctivae; no lid-lag; PERRLA  HENT: Atraumatic; oropharynx clear with moist mucous membranes and no mucosal ulcerations; normal hard and soft palate  Neck: Trachea midline; FROM, supple, no thyromegaly or lymphadenopathy  Lungs: CTA, with normal respiratory effort and no intercostal retractions  CV: RRR, no MRGs, no JVD   Abdomen: Soft, non-tender; no masses or HSM  Extremities: No peripheral edema or extremity lymphadenopathy  Skin: Normal temperature, turgor and texture; no rash, ulcers or subcutaneous nodules  Psych: Appropriate affect, alert and oriented to person, " place and time    Data:  Lipids:   Lab Results   Component Value Date/Time    CHOLSTRLTOT 183 09/14/2020 07:58 AM    TRIGLYCERIDE 136 09/14/2020 07:58 AM    HDL 60 09/14/2020 07:58 AM    LDL 96 09/14/2020 07:58 AM        BMP:  Lab Results   Component Value Date/Time    SODIUM 139 09/14/2020 0758    POTASSIUM 4.5 09/14/2020 0758    CHLORIDE 103 09/14/2020 0758    CO2 26 09/14/2020 0758    GLUCOSE 124 (H) 09/14/2020 0758    BUN 14 09/14/2020 0758    CREATININE 0.73 09/14/2020 0758    CALCIUM 9.5 09/14/2020 0758    ANION 10.0 09/14/2020 0758        TSH:   Lab Results   Component Value Date/Time    TSHULTRASEN 1.010 06/01/2015 0814        THYROXINE (T4):   No results found for: LEÓNIR     CBC:   Lab Results   Component Value Date/Time    WBC 4.9 09/14/2020 07:58 AM    RBC 4.89 09/14/2020 07:58 AM    HEMOGLOBIN 14.6 09/14/2020 07:58 AM    HEMATOCRIT 45.8 09/14/2020 07:58 AM    MCV 93.7 09/14/2020 07:58 AM    MCH 29.9 09/14/2020 07:58 AM    MCHC 31.9 (L) 09/14/2020 07:58 AM    RDW 47.6 09/14/2020 07:58 AM    PLATELETCT 298 09/14/2020 07:58 AM    MPV 9.7 09/14/2020 07:58 AM    NEUTSPOLYS 59.80 09/14/2020 07:58 AM    LYMPHOCYTES 26.20 09/14/2020 07:58 AM    MONOCYTES 10.20 09/14/2020 07:58 AM    EOSINOPHILS 2.40 09/14/2020 07:58 AM    BASOPHILS 1.00 09/14/2020 07:58 AM    IMMGRAN 0.40 09/14/2020 07:58 AM    NRBC 0.00 09/14/2020 07:58 AM    NEUTS 2.94 09/14/2020 07:58 AM    LYMPHS 1.29 09/14/2020 07:58 AM    MONOS 0.50 09/14/2020 07:58 AM    EOS 0.12 09/14/2020 07:58 AM    BASO 0.05 09/14/2020 07:58 AM    IMMGRANAB 0.02 09/14/2020 07:58 AM    NRBCAB 0.00 09/14/2020 07:58 AM        CBC w/o DIFF  Lab Results   Component Value Date/Time    WBC 4.9 09/14/2020 07:58 AM    RBC 4.89 09/14/2020 07:58 AM    HEMOGLOBIN 14.6 09/14/2020 07:58 AM    MCV 93.7 09/14/2020 07:58 AM    MCH 29.9 09/14/2020 07:58 AM    MCHC 31.9 (L) 09/14/2020 07:58 AM    RDW 47.6 09/14/2020 07:58 AM    MPV 9.7 09/14/2020 07:58 AM       Prior echo/stress  results reviewed: Normal echocardiogram    EKG interpreted by me: Sinus rhythm    Impression/Plan:  1. Paroxysmal atrial fibrillation (HCC)  EKG     1.  Paroxysmal atrial fibrillation    -We discussed treatment options for paroxysmal A. fib.  She is not terribly bothered by it at the moment.  We did discuss options available including antiarrhythmic medications and ablation.  I did educate her about the pros and cons of the strategies for rhythm control.  For now she wants to keep an eye on things and follow-up in 6 months.  I think this is reasonable.  I told her to give me a call if her symptoms become worse sooner than that.  -Continue oral anticoagulation with Pradaxa    Follow-up in 6 months    Darrian Garcia MD  Cardiac Electrophysiology

## 2021-01-15 DIAGNOSIS — Z23 NEED FOR VACCINATION: ICD-10-CM

## 2021-03-18 ENCOUNTER — HOSPITAL ENCOUNTER (OUTPATIENT)
Dept: LAB | Facility: MEDICAL CENTER | Age: 71
End: 2021-03-18
Attending: INTERNAL MEDICINE
Payer: MEDICARE

## 2021-03-18 DIAGNOSIS — E78.5 DYSLIPIDEMIA: ICD-10-CM

## 2021-03-18 LAB
ALBUMIN SERPL BCP-MCNC: 3.9 G/DL (ref 3.2–4.9)
ALBUMIN/GLOB SERPL: 1.3 G/DL
ALP SERPL-CCNC: 85 U/L (ref 30–99)
ALT SERPL-CCNC: 17 U/L (ref 2–50)
ANION GAP SERPL CALC-SCNC: 7 MMOL/L (ref 7–16)
AST SERPL-CCNC: 15 U/L (ref 12–45)
BILIRUB SERPL-MCNC: 0.8 MG/DL (ref 0.1–1.5)
BUN SERPL-MCNC: 13 MG/DL (ref 8–22)
CALCIUM SERPL-MCNC: 9.6 MG/DL (ref 8.5–10.5)
CHLORIDE SERPL-SCNC: 105 MMOL/L (ref 96–112)
CHOLEST SERPL-MCNC: 170 MG/DL (ref 100–199)
CO2 SERPL-SCNC: 29 MMOL/L (ref 20–33)
CREAT SERPL-MCNC: 0.73 MG/DL (ref 0.5–1.4)
FASTING STATUS PATIENT QL REPORTED: NORMAL
GLOBULIN SER CALC-MCNC: 2.9 G/DL (ref 1.9–3.5)
GLUCOSE SERPL-MCNC: 128 MG/DL (ref 65–99)
HDLC SERPL-MCNC: 63 MG/DL
LDLC SERPL CALC-MCNC: 84 MG/DL
POTASSIUM SERPL-SCNC: 4.7 MMOL/L (ref 3.6–5.5)
PROT SERPL-MCNC: 6.8 G/DL (ref 6–8.2)
SODIUM SERPL-SCNC: 141 MMOL/L (ref 135–145)
TRIGL SERPL-MCNC: 115 MG/DL (ref 0–149)

## 2021-03-18 PROCEDURE — 36415 COLL VENOUS BLD VENIPUNCTURE: CPT

## 2021-03-18 PROCEDURE — 80053 COMPREHEN METABOLIC PANEL: CPT

## 2021-03-18 PROCEDURE — 80061 LIPID PANEL: CPT

## 2021-03-19 ENCOUNTER — TELEPHONE (OUTPATIENT)
Dept: MEDICAL GROUP | Facility: PHYSICIAN GROUP | Age: 71
End: 2021-03-19

## 2021-03-19 NOTE — TELEPHONE ENCOUNTER
ESTABLISHED PATIENT PRE-VISIT PLANNING     Patient was NOT contacted to complete PVP.     Note: Patient will not be contacted if there is no indication to call.     1.  Reviewed notes from the last few office visits within the medical group: Yes    2.  If any orders were placed at last visit or intended to be done for this visit (i.e. 6 mos follow-up), do we have Results/Consult Notes?         •  Labs - Labs were not ordered at last office visit.  Note: If patient appointment is for lab review and patient did not complete labs, check with provider if OK to reschedule patient until labs completed.       •  Imaging - Imaging was not ordered at last office visit.       •  Referrals - No referrals were ordered at last office visit.    3. Is this appointment scheduled as a Hospital Follow-Up? No    4.  Immunizations were updated in Epic using Reconcile Outside Information activity? Yes    5.  Patient is due for the following Health Maintenance Topics:   Health Maintenance Due   Topic Date Due   • DIABETES MONOFILAMENT / LE EXAM  Never done   • COVID-19 Vaccine (1 of 2) Never done   • RETINAL SCREENING  Never done   • IMM ZOSTER VACCINES (1 of 2) Never done   • IMM INFLUENZA (1) Never done   • A1C SCREENING  03/14/2021     6.  AHA (Pulse8) form printed for Provider? Yes

## 2021-03-22 ENCOUNTER — OFFICE VISIT (OUTPATIENT)
Dept: MEDICAL GROUP | Facility: PHYSICIAN GROUP | Age: 71
End: 2021-03-22
Payer: MEDICARE

## 2021-03-22 VITALS
WEIGHT: 180.6 LBS | BODY MASS INDEX: 32 KG/M2 | HEIGHT: 63 IN | SYSTOLIC BLOOD PRESSURE: 116 MMHG | OXYGEN SATURATION: 98 % | HEART RATE: 83 BPM | TEMPERATURE: 97 F | DIASTOLIC BLOOD PRESSURE: 60 MMHG

## 2021-03-22 DIAGNOSIS — I48.0 PAROXYSMAL ATRIAL FIBRILLATION (HCC): ICD-10-CM

## 2021-03-22 DIAGNOSIS — E55.9 VITAMIN D DEFICIENCY: ICD-10-CM

## 2021-03-22 DIAGNOSIS — D68.69 SECONDARY HYPERCOAGULABLE STATE (HCC): ICD-10-CM

## 2021-03-22 DIAGNOSIS — E11.9 TYPE 2 DIABETES MELLITUS WITHOUT COMPLICATION, WITHOUT LONG-TERM CURRENT USE OF INSULIN (HCC): ICD-10-CM

## 2021-03-22 DIAGNOSIS — E78.2 MIXED HYPERLIPIDEMIA: ICD-10-CM

## 2021-03-22 PROCEDURE — 99215 OFFICE O/P EST HI 40 MIN: CPT | Performed by: INTERNAL MEDICINE

## 2021-03-22 ASSESSMENT — FIBROSIS 4 INDEX: FIB4 SCORE: 0.88

## 2021-03-22 ASSESSMENT — PATIENT HEALTH QUESTIONNAIRE - PHQ9: CLINICAL INTERPRETATION OF PHQ2 SCORE: 0

## 2021-03-22 NOTE — ASSESSMENT & PLAN NOTE
Previous problem, stable.  Continue periodic evaluation, continue vitamin D supplementation in the meantime.

## 2021-03-22 NOTE — ASSESSMENT & PLAN NOTE
Chronic and ongoing problem.  Appropriate to continue Pradaxa for stroke prophylaxis due to paroxysmal atrial fibrillation.  No bleeding or bruising complications.  Renal function satisfactory to continue this medicine.

## 2021-03-22 NOTE — ASSESSMENT & PLAN NOTE
Previous problem, stable.  A1c back down to the prediabetic range.  No indication for pharmacotherapy at this time.  No neuropathy on exam.  Next retinal exam will be around May 2021.  No history of chronic kidney disease or nephropathy.  We will continue with periodic evaluations to ensure ongoing stability.  Continue lovastatin and lisinopril.  No aspirin due to use of Pradaxa.

## 2021-03-22 NOTE — ASSESSMENT & PLAN NOTE
Chronic ongoing problem.  Appropriate to continue lovastatin 40 mg daily.  Continue follow-up with cardiology as recommended.  Will update lipid panel in fall 2021.

## 2021-03-22 NOTE — PROGRESS NOTES
Subjective:   Chief Complaint/History of Present Illness:  Cristela Alan is a 70 y.o. female established patient who presents today to discuss medical problems as listed below. Cristela is unaccompanied for today's visit.    Problem   Atrial Fibrillation (Hcc)    EKG performed on (08/24/14) EKG shows normal sinus rhythm.  EKG performed on (07/30/14) EKG shows normal sinus rhythm.  EKG performed on (12/06/12) EKG shows normal sinus rhythm.  EKG performed on (12/05/12) EKG shows atrial fibrillation.    She reports waking up one morning and experiencing palpitations.  Upon further evaluation she was found to be in atrial fibrillation.  This was in December 2012.  She was treated with cardioversion and since that time has maintained normal sinus rhythm.  She notes very occasionally she will feel palpitations, and thus it is labeled as paroxysmal atrial fibrillation.  She has a rate control approach with metoprolol tartrate 25 mg twice daily.  She is also on Pradaxa for systemic anticoagulation and stroke risk management.  She follows with cardiology, Dr. Avery, annually.    Regimen: metoprolol tartrate 25 mg twice daily and Pradaxa 150 mg daily       Secondary hypercoagulable state (HCC)    She has been using Pradaxa since 2012 due to paroxysmal atrial fibrillation.  No complications of bruising or bleeding at this time.     Type 2 Diabetes Mellitus Without Complication, Without Long-Term Current Use of Insulin ([facility])       Ref. Range 5/30/2019 08:21 9/14/2020 07:58   Glycohemoglobin Latest Ref Range: 0.0 - 5.6 % 6.6 (H) 6.1 (H)   Estim. Avg Glu Latest Units: mg/dL 143 128      Ref. Range 9/14/2020 07:58   Cholesterol,Tot Latest Ref Range: 100 - 199 mg/dL 183   Triglycerides Latest Ref Range: 0 - 149 mg/dL 136   HDL Latest Ref Range: >=40 mg/dL 60   LDL Latest Ref Range: <100 mg/dL 96   Micro Alb Creat Ratio Latest Ref Range: 0 - 30 mg/g see below   Creatinine, Urine Latest Units: mg/dL 100.84   Microalbumin, Urine  Random Latest Units: mg/dL <1.2      Ref. Range 9/14/2020 07:58   Bun Latest Ref Range: 8 - 22 mg/dL 14   Creatinine Latest Ref Range: 0.50 - 1.40 mg/dL 0.73   GFR If Non  Latest Ref Range: >60 mL/min/1.73 m 2 >60     She technically met criteria for type 2 diabetes as of May 2019.  Does not appear this diagnosis was ever given to the patient.  She has never taken any glucose lowering medications.  She is on statin therapy for history of elevated cholesterol and TIA and she is also on ACE inhibitor for history of hypertension.  No known history of retinopathy, nephropathy, neuropathy.      Vitamin D Deficiency       Ref. Range 9/14/2016 7/19/2018 9/14/2020   25-Hydroxy   Vitamin D 25 Latest Ref Range: 30 - 100 ng/mL 47 48 69     She has prior history of vitamin D deficiency, currently sufficient.  She also has osteopenia on vitamin D replacement.  No known history of parathyroid disease or chronic kidney disease.     Hyperlipidemia       Ref. Range 9/14/2020 07:58 3/18/2021 08:22   Cholesterol,Tot Latest Ref Range: 100 - 199 mg/dL 183 170   Triglycerides Latest Ref Range: 0 - 149 mg/dL 136 115   HDL Latest Ref Range: >=40 mg/dL 60 63   LDL Latest Ref Range: <100 mg/dL 96 84     She has history of elevated cholesterol in the past.  She is taking lovastatin which will be an ongoing recommendation due to history of mini strokes, atrial fibrillation, and impaired fasting glucose.  No myalgias or GI upset on this medication.          Current Medications:  Current Outpatient Medications Ordered in Epic   Medication Sig Dispense Refill   • dabigatran (PRADAXA) 150 MG Cap capsule Take 1 Cap by mouth 2 Times a Day. TWICE DAILY 180 Cap 3   • lovastatin (MEVACOR) 40 MG tablet Take 2 Tabs by mouth every day. 180 Tab 3   • lisinopril (PRINIVIL) 5 MG Tab Take 1 Tab by mouth every day. 100 Tab 3   • metoprolol (LOPRESSOR) 25 MG Tab TAKE ONE TABLET BY MOUTH TWICE DAILY 200 Tab 3   • Multiple Vitamins-Minerals  "(PRESERVISION AREDS PO) Take  by mouth.     • Cholecalciferol (VITAMIN D HIGH POTENCY PO) Take 2,000 mg by mouth every day.     • Polyethylene Glycol 400 (BLINK TEARS OP) by Ophthalmic route every day.     • Calcium 600 MG TABS Take 1 Tab by mouth every morning. Taking 1000mg  Indications: per pt is taking 500 mg     • multivitamin (THERAGRAN) TABS Take 1 Tab by mouth every morning.       • Polyethyl Glycol-Propyl Glycol (SYSTANE OP) Place 1 Drop in both eyes every bedtime. Indications: **OTC**       No current Epic-ordered facility-administered medications on file.          Objective:   Physical Exam:    Vitals: /60 (BP Location: Right arm, Patient Position: Sitting, BP Cuff Size: Adult)   Pulse 83   Temp 36.1 °C (97 °F) (Temporal)   Ht 1.6 m (5' 3\")   Wt 81.9 kg (180 lb 9.6 oz)   SpO2 98%    BMI: Body mass index is 31.99 kg/m².  Physical Exam   Constitutional: She is well-developed, well-nourished, and in no distress.   Eyes: Conjunctivae are normal.   Cardiovascular: Normal rate and regular rhythm.   Pulmonary/Chest: Effort normal and breath sounds normal. No respiratory distress.   Musculoskeletal:         General: No edema.   Neurological: She is alert.   Skin: Skin is warm and dry. No rash noted.   Psychiatric: Mood, memory, affect and judgment normal.    Diabetic Foot Exam: No ulcers/laceration/blisters present on bilateral feet, normal gross anatomy of bilateral feet without abnormal curvature or arch, no toe deformities, right foot 1st and 2nd toenails are thickened and remaining are normal, no ingrown toenails, no increase in skin temperature bilaterally, no skin erythema to bilateral feet, bilateral dorsalis pedis 2+ and equal, Refill less than 2 seconds bilaterally, Yovana 10 g monofilament testing normal in bilateral great toes, bilateral balls of feet at medial/lateral/mid ball of foot         Assessment and Plan:   Cristela is a 70 y.o. female with the following:  Problem List Items " Addressed This Visit     Atrial fibrillation (HCC)     Chronic and ongoing problem. She is currently in normal sinus rhythm. Continue rate control strategy with metoprolol tartrate and systemic anticoagulation with Pradaxa.  Continue follow-up with cardiology as recommended.         Secondary hypercoagulable state (HCC)     Chronic and ongoing problem.  Appropriate to continue Pradaxa for stroke prophylaxis due to paroxysmal atrial fibrillation.  No bleeding or bruising complications.  Renal function satisfactory to continue this medicine.         Vitamin D deficiency     Previous problem, stable.  Continue periodic evaluation, continue vitamin D supplementation in the meantime.         Relevant Orders    VITAMIN D,25 HYDROXY    Hyperlipidemia     Chronic ongoing problem.  Appropriate to continue lovastatin 40 mg daily.  Continue follow-up with cardiology as recommended.  Will update lipid panel in fall 2021.         Relevant Orders    CBC WITH DIFFERENTIAL    Comp Metabolic Panel    Lipid Profile    Type 2 diabetes mellitus without complication, without long-term current use of insulin (HCC)     Previous problem, stable.  A1c back down to the prediabetic range.  No indication for pharmacotherapy at this time.  No neuropathy on exam.  Next retinal exam will be around May 2021.  No history of chronic kidney disease or nephropathy.  We will continue with periodic evaluations to ensure ongoing stability.  Continue lovastatin and lisinopril.  No aspirin due to use of Pradaxa.         Relevant Orders    CBC WITH DIFFERENTIAL    Comp Metabolic Panel    HEMOGLOBIN A1C    MICROALBUMIN CREAT RATIO URINE    Diabetic Monofilament Lower Extremity Exam (Completed)         Annual Health Assessment Questions:    1.  Are you currently engaging in any exercise or physical activity? Yes  PT exercises especially for hip  2.  How would you describe your mood or emotional well-being today? good    3.  Have you had any falls in the last  year? No    4.  Have you noticed any problems with your balance or had difficulty walking? No    5.  In the last six months have you experienced any leakage of urine? No    6. DPA/Advanced Directive: Patient does not have an Advanced Directive.  A packet and workshop information was given on Advanced Directives.       RTC: Return in about 6 months (around 9/22/2021).    I spent a total of 40 minutes with record review, exam, communication with the patient, communication with other providers, and documentation of this encounter.    PLEASE NOTE: This dictation was created using voice recognition software. I have made every reasonable attempt to correct obvious errors, but I expect that there are errors of grammar and possibly content that I did not discover before finalizing the note.      Temitope Bess, DO  Geriatric and Internal Medicine  [facility]

## 2021-03-22 NOTE — ASSESSMENT & PLAN NOTE
Chronic and ongoing problem. She is currently in normal sinus rhythm. Continue rate control strategy with metoprolol tartrate and systemic anticoagulation with Pradaxa.  Continue follow-up with cardiology as recommended.

## 2021-03-24 ENCOUNTER — TELEPHONE (OUTPATIENT)
Dept: CARDIOLOGY | Facility: MEDICAL CENTER | Age: 71
End: 2021-03-24

## 2021-03-24 ENCOUNTER — IMMUNIZATION (OUTPATIENT)
Dept: FAMILY PLANNING/WOMEN'S HEALTH CLINIC | Facility: IMMUNIZATION CENTER | Age: 71
End: 2021-03-24
Attending: INTERNAL MEDICINE
Payer: MEDICARE

## 2021-03-24 DIAGNOSIS — Z23 ENCOUNTER FOR VACCINATION: Primary | ICD-10-CM

## 2021-03-24 DIAGNOSIS — Z23 NEED FOR VACCINATION: ICD-10-CM

## 2021-03-24 DIAGNOSIS — E78.5 DYSLIPIDEMIA: ICD-10-CM

## 2021-03-24 DIAGNOSIS — I65.23 BILATERAL CAROTID ARTERY STENOSIS: ICD-10-CM

## 2021-03-24 PROCEDURE — 91300 PFIZER SARS-COV-2 VACCINE: CPT | Performed by: INTERNAL MEDICINE

## 2021-03-24 PROCEDURE — 0001A PFIZER SARS-COV-2 VACCINE: CPT | Performed by: INTERNAL MEDICINE

## 2021-03-24 RX ORDER — ATORVASTATIN CALCIUM 40 MG/1
40 TABLET, FILM COATED ORAL EVERY EVENING
Qty: 90 TABLET | Refills: 3 | Status: SHIPPED | OUTPATIENT
Start: 2021-03-24 | End: 2021-03-25 | Stop reason: SDUPTHER

## 2021-03-24 NOTE — TELEPHONE ENCOUNTER
----- Message from Demetris Avery M.D. sent at 3/24/2021 11:10 AM PDT -----  Please call patient with unremarkable results with good cholesterol profile, however, LDL is not at target for carotid artery stenosis. Please consider changing lovastatin to atorvastatin 40 mg and repeat labs in 3 months.    Thanks.  LINDSEY.  ----- Message -----  From: Aby Abrams R.N.  Sent: 3/19/2021   3:53 PM PDT  To: Demetris Avery M.D.    To Dr. Avery: FU with  6/3/21

## 2021-03-24 NOTE — TELEPHONE ENCOUNTER
Notified patient of results and recommendations. She just picked up her refill of lovastatin this morning and is hesitant to waste a full bottle of medication. I told her I would send the new prescription in so she can find out the cost to her and make her decision to switch or not. Patient was advised of goal LDL with hx CAD, and she verbalizes understanding. She will call us back if she decides atorvastatin cost is too much for her right now.    Prescription sent to [facility] pharmacy and lab orders mailed to patient.

## 2021-03-25 RX ORDER — ATORVASTATIN CALCIUM 40 MG/1
40 TABLET, FILM COATED ORAL EVERY EVENING
Qty: 90 TABLET | Refills: 3 | Status: SHIPPED | OUTPATIENT
Start: 2021-03-25 | End: 2022-01-12

## 2021-03-25 NOTE — TELEPHONE ENCOUNTER
----- Message from ELIZABETH Nogueira sent at 3/24/2021  3:34 PM PDT -----  Recommend transition from lovastatin 40 mg to atorvastatin 20 mg QPM for LDL not at goal with hx of carotid disease and TIA. Repeat cmp and lipid in 3 months. SC

## 2021-04-16 ENCOUNTER — IMMUNIZATION (OUTPATIENT)
Dept: FAMILY PLANNING/WOMEN'S HEALTH CLINIC | Facility: IMMUNIZATION CENTER | Age: 71
End: 2021-04-16
Attending: INTERNAL MEDICINE
Payer: MEDICARE

## 2021-04-16 DIAGNOSIS — Z23 ENCOUNTER FOR VACCINATION: Primary | ICD-10-CM

## 2021-04-16 PROCEDURE — 0002A PFIZER SARS-COV-2 VACCINE: CPT

## 2021-04-16 PROCEDURE — 91300 PFIZER SARS-COV-2 VACCINE: CPT

## 2021-04-19 DIAGNOSIS — I48.91 ATRIAL FIBRILLATION, UNSPECIFIED TYPE (HCC): ICD-10-CM

## 2021-04-20 RX ORDER — ATENOLOL 100 MG/1
TABLET ORAL
Qty: 180 CAPSULE | Refills: 2 | Status: SHIPPED | OUTPATIENT
Start: 2021-04-20 | End: 2021-09-03 | Stop reason: SDUPTHER

## 2021-06-03 ENCOUNTER — OFFICE VISIT (OUTPATIENT)
Dept: CARDIOLOGY | Facility: MEDICAL CENTER | Age: 71
End: 2021-06-03
Payer: MEDICARE

## 2021-06-03 VITALS
WEIGHT: 174.6 LBS | SYSTOLIC BLOOD PRESSURE: 122 MMHG | RESPIRATION RATE: 12 BRPM | BODY MASS INDEX: 29.81 KG/M2 | HEART RATE: 60 BPM | HEIGHT: 64 IN | DIASTOLIC BLOOD PRESSURE: 84 MMHG | OXYGEN SATURATION: 95 %

## 2021-06-03 DIAGNOSIS — I48.0 PAROXYSMAL ATRIAL FIBRILLATION (HCC): ICD-10-CM

## 2021-06-03 LAB — EKG IMPRESSION: NORMAL

## 2021-06-03 PROCEDURE — 99214 OFFICE O/P EST MOD 30 MIN: CPT | Performed by: INTERNAL MEDICINE

## 2021-06-03 PROCEDURE — 93000 ELECTROCARDIOGRAM COMPLETE: CPT | Performed by: INTERNAL MEDICINE

## 2021-06-03 ASSESSMENT — FIBROSIS 4 INDEX: FIB4 SCORE: 0.88

## 2021-06-03 NOTE — PROGRESS NOTES
"Arrhythmia Clinic Note (Established patient)    DOS: 6/3/2021    Chief complaint/Reason for consult: Afib    Interval History: 70-year-old female with history of atrial fibrillation requiring cardioversion 9 years ago, paroxysmal, on Pradaxa and metoprolol.  She notes that overall her palpitations are a little bit more frequent lasting a little bit longer more recently, however nothing longer than 5 minutes at a time.  She has not needed to go to the emergency room.  She plans to take an extra metoprolol as needed if she has a sustained episode of atrial fibrillation.    ROS (+ highlighted in bold):  Constitutional: Fevers/chills/fatigue/weightloss  HEENT: Blurry vision/eye pain/sore throat/hearing loss  Respiratory: Shortness of breath/cough  Cardiovascular: Chest pain/palpitations/edema/orthopnea/syncope  GI: Nausea/vomitting/diarrhea  MSK: Arthralgias/myagias/muscle weakness  Skin: Rash/sores  Neurological: Numbness/tremors/vertigo  Endocrine: Excessive thirst/polyuria/cold intolerance/heat intolerance  Psych: Depression/anxiety    Past Medical History:   Diagnosis Date   • Allergic rhinitis    • Anesthesia     hypotension   • Atrial fibrillation (HCC)    • Cancer (HCC) 2014    endometrial   • CATARACT     bilateral lens implants   • Cholesterol blood decreased    • Dental disorder     dentures   • Dermatitis, contact    • Dyslipidemia 6/30/2016   • Encounter for general adult medical examination without abnormal findings 12/2/2014    Alexia for GI No flu shots- egg allergies  Declines all vaccines- had reaction to tdap   • Endometrium cancer (HCC)    • Hyperlipidemia    • Hypertension    • Impaired glucose tolerance    • Macular degeneration    • Obesity    • Pneumonia     had PNU 35 years ago   • Prolapsed uterus 8/2014    current issue   • Pulmonary nodule 11/9/2016    No history of tobacco use CT 9/16, 12/16- no change-6/17- ? New nodule>>PET CT-10/17 neg    • Stroke (HCC)     \"mini strokes\" TIA, no residual " "  • TIA (transient ischemic attack)    • Unspecified hemorrhagic conditions     NOSE BLEEDS/on Plavix   • Vitamin D deficiency        Past Surgical History:   Procedure Laterality Date   • HYSTERECTOMY ROBOTIC XI  8/28/2014    Performed by Pantera Wylie M.D. at SURGERY [facility] ORS   • LYMPH NODE DISSECTION ROBOTIC XI  8/28/2014    Performed by Pantera Wylie M.D. at SURGERY [facility] ORS   • CATARACT PHACO WITH IOL  8/19/2010    Performed by DAVE AGUILAR at SURGERY SAME DAY [facility] ORS   • CATARACT PHACO WITH IOL  8/5/2010    Performed by DAVE AGUILAR at SURGERY SAME DAY [facility] ORS   • HIP ARTHROSCOPY  5/12/08    Performed by ZINA HOLLIS at SURGERY [facility]   • ACETABULAR OSTEOTOMY  5/12/08    Performed by ZINA HOLLIS at SURGERY [facility]   • ORTHOPEDIC OSTEOTOMY  5/12/08    Performed by ZINA HOLLIS at SURGERY [facility] ORS   • NASAL POLYPECTOMY  1994    \"removal of papilloma\"   • ABDOMINAL HYSTERECTOMY TOTAL     • EYE SURGERY         Social History     Socioeconomic History   • Marital status:      Spouse name: Not on file   • Number of children: Not on file   • Years of education: Not on file   • Highest education level: Not on file   Occupational History   • Not on file   Tobacco Use   • Smoking status: Never Smoker   • Smokeless tobacco: Never Used   • Tobacco comment: continued abstinence   Vaping Use   • Vaping Use: Never used   Substance and Sexual Activity   • Alcohol use: No   • Drug use: No   • Sexual activity: Not Currently   Other Topics Concern   • Not on file   Social History Narrative    She is  to Art for 49 years, they met at a [employer] r[place]. She has a daughter Nikki (43, [place]) and she has 2 kids. She retired in her late 60's. 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     Social Determinants of Health     Financial Resource Strain:    • Difficulty of Paying Living Expenses:    Food Insecurity:    • Worried About Running Out of " "Food in the Last Year:    • Ran Out of Food in the Last Year:    Transportation Needs:    • Lack of Transportation (Medical):    • Lack of Transportation (Non-Medical):    Physical Activity:    • Days of Exercise per Week:    • Minutes of Exercise per Session:    Stress:    • Feeling of Stress :    Social Connections:    • Frequency of Communication with Friends and Family:    • Frequency of Social Gatherings with Friends and Family:    • Attends [employer] Services:    • Active Member of Clubs or Organizations:    • Attends Club or Organization Meetings:    • Marital Status:    Intimate Partner Violence:    • Fear of Current or Ex-Partner:    • Emotionally Abused:    • Physically Abused:    • Sexually Abused:        Family History   Problem Relation Age of Onset   • Hyperlipidemia Mother    • Hypertension Mother    • Cancer Mother    • No Known Problems Father    • Heart Disease Sister    • Alcohol/Drug Sister    • Hypertension Sister    • Heart Attack Sister    • Cancer Sister 66        ? breast cancer as well   • Hypertension Sister    • Hyperlipidemia Sister    • Cancer Other    • Hypertension Other    • Diabetes Maternal Grandmother         elderly   • Other Sister         suicide    • Stroke Neg Hx        Allergies   Allergen Reactions   • Fentanyl Anaphylaxis   • Flu Virus Vaccine    • Versed Shortness of Breath and Vomiting   • Bee      And  spider--- tongue swelling   • Codeine      Increased heart rate   • Eggs Vomiting   • Lidocaine Hcl      \"severe drop in BP\"   • Other Drug      Novocaine hypotension ALL \"MELISA\"   • Penicillins      Increased heart rate   • Soap Rash     Laundry Tide soap, Dove and Ivory soap   • Tape Rash     Paper tape OK   • Xylocaine [Lidocaine Hcl, Local Anesth.]      All \"MELISA\" hypotension       Current Outpatient Medications   Medication Sig Dispense Refill   • PRADAXA 150 MG Cap capsule TAKE ONE CAPSULE BY MOUTH TWICE DAILY  180 capsule 2   • metoprolol tartrate (LOPRESSOR) 25 " "MG Tab TAKE ONE TABLET BY MOUTH TWICE DAILY  200 tablet 2   • atorvastatin (LIPITOR) 40 MG Tab Take 1 tablet by mouth every evening. 90 tablet 3   • lisinopril (PRINIVIL) 5 MG Tab Take 1 Tab by mouth every day. 100 Tab 3   • Multiple Vitamins-Minerals (PRESERVISION AREDS PO) Take  by mouth.     • Cholecalciferol (VITAMIN D HIGH POTENCY PO) Take 2,000 mg by mouth every day.     • Polyethylene Glycol 400 (BLINK TEARS OP) by Ophthalmic route every day.     • Calcium 600 MG TABS Take 1 Tab by mouth every morning. Taking 1000mg  Indications: per pt is taking 500 mg     • multivitamin (THERAGRAN) TABS Take 1 Tab by mouth every morning.       • Polyethyl Glycol-Propyl Glycol (SYSTANE OP) Place 1 Drop in both eyes every bedtime. Indications: **OTC**       No current facility-administered medications for this visit.       Physical Exam:  Vitals:    06/03/21 1102   BP: 122/84   BP Location: Left arm   Patient Position: Sitting   BP Cuff Size: Adult   Pulse: 60   Resp: 12   SpO2: 95%   Weight: 79.2 kg (174 lb 9.6 oz)   Height: 1.626 m (5' 4\")     General appearance: NAD, conversant   Eyes: anicteric sclerae, moist conjunctivae; no lid-lag; PERRLA  HENT: Atraumatic; oropharynx clear with moist mucous membranes and no mucosal ulcerations; normal hard and soft palate  Neck: Trachea midline; FROM, supple, no thyromegaly or lymphadenopathy  Lungs: CTA, with normal respiratory effort and no intercostal retractions  CV: RRR, no MRGs, no JVD  Abdomen: Soft, non-tender; no masses or HSM  Extremities: No peripheral edema or extremity lymphadenopathy  Skin: Normal temperature, turgor and texture; no rash, ulcers or subcutaneous nodules  Psych: Appropriate affect, alert and oriented to person, place and time    Data:  Lipids:   Lab Results   Component Value Date/Time    CHOLSTRLTOT 170 03/18/2021 08:22 AM    TRIGLYCERIDE 115 03/18/2021 08:22 AM    HDL 63 03/18/2021 08:22 AM    LDL 84 03/18/2021 08:22 AM        BMP:  Lab Results "   Component Value Date/Time    SODIUM 141 03/18/2021 0822    POTASSIUM 4.7 03/18/2021 0822    CHLORIDE 105 03/18/2021 0822    CO2 29 03/18/2021 0822    GLUCOSE 128 (H) 03/18/2021 0822    BUN 13 03/18/2021 0822    CREATININE 0.73 03/18/2021 0822    CALCIUM 9.6 03/18/2021 0822    ANION 7.0 03/18/2021 0822        TSH:   Lab Results   Component Value Date/Time    TSHULTRASEN 1.010 06/01/2015 0814        THYROXINE (T4):   No results found for: FREEDIR     CBC:   Lab Results   Component Value Date/Time    WBC 4.9 09/14/2020 07:58 AM    RBC 4.89 09/14/2020 07:58 AM    HEMOGLOBIN 14.6 09/14/2020 07:58 AM    HEMATOCRIT 45.8 09/14/2020 07:58 AM    MCV 93.7 09/14/2020 07:58 AM    MCH 29.9 09/14/2020 07:58 AM    MCHC 31.9 (L) 09/14/2020 07:58 AM    RDW 47.6 09/14/2020 07:58 AM    PLATELETCT 298 09/14/2020 07:58 AM    MPV 9.7 09/14/2020 07:58 AM    NEUTSPOLYS 59.80 09/14/2020 07:58 AM    LYMPHOCYTES 26.20 09/14/2020 07:58 AM    MONOCYTES 10.20 09/14/2020 07:58 AM    EOSINOPHILS 2.40 09/14/2020 07:58 AM    BASOPHILS 1.00 09/14/2020 07:58 AM    IMMGRAN 0.40 09/14/2020 07:58 AM    NRBC 0.00 09/14/2020 07:58 AM    NEUTS 2.94 09/14/2020 07:58 AM    LYMPHS 1.29 09/14/2020 07:58 AM    MONOS 0.50 09/14/2020 07:58 AM    EOS 0.12 09/14/2020 07:58 AM    BASO 0.05 09/14/2020 07:58 AM    IMMGRANAB 0.02 09/14/2020 07:58 AM    NRBCAB 0.00 09/14/2020 07:58 AM        CBC w/o DIFF  Lab Results   Component Value Date/Time    WBC 4.9 09/14/2020 07:58 AM    RBC 4.89 09/14/2020 07:58 AM    HEMOGLOBIN 14.6 09/14/2020 07:58 AM    MCV 93.7 09/14/2020 07:58 AM    MCH 29.9 09/14/2020 07:58 AM    MCHC 31.9 (L) 09/14/2020 07:58 AM    RDW 47.6 09/14/2020 07:58 AM    MPV 9.7 09/14/2020 07:58 AM         EKG interpreted by me: Sinus rhythm    Impression/Plan:  1. Paroxysmal atrial fibrillation (HCC)  EKG     1.  Paroxysmal atrial fibrillation  2.  Oral anticoagulation management, Pradaxa    -We discussed treatment options for atrial fibrillation including  antiarrhythmic medication and electrophysiology study with ablation  -She would prefer noninvasive measures before invasive measures  -Given that her palpitations are not too poorly controlled right now, we will continue with metoprolol and Pradaxa alone.  I do believe that flecainide would be a reasonable medication to add if she starts having more palpitations.    I will see her back in 6 months    Darrian Garcia MD  Cardiac Electrophysiology

## 2021-07-12 DIAGNOSIS — I10 ESSENTIAL HYPERTENSION: ICD-10-CM

## 2021-07-12 RX ORDER — LISINOPRIL 5 MG/1
5 TABLET ORAL DAILY
Qty: 100 TABLET | Refills: 3 | Status: SHIPPED | OUTPATIENT
Start: 2021-07-12 | End: 2022-08-23 | Stop reason: SDUPTHER

## 2021-07-17 ENCOUNTER — PATIENT MESSAGE (OUTPATIENT)
Dept: HEALTH INFORMATION MANAGEMENT | Facility: OTHER | Age: 71
End: 2021-07-17

## 2021-09-03 ENCOUNTER — HOSPITAL ENCOUNTER (OUTPATIENT)
Dept: LAB | Facility: MEDICAL CENTER | Age: 71
End: 2021-09-03
Attending: INTERNAL MEDICINE
Payer: MEDICARE

## 2021-09-03 DIAGNOSIS — E78.5 DYSLIPIDEMIA: ICD-10-CM

## 2021-09-03 DIAGNOSIS — I65.23 BILATERAL CAROTID ARTERY STENOSIS: ICD-10-CM

## 2021-09-03 DIAGNOSIS — I48.91 ATRIAL FIBRILLATION, UNSPECIFIED TYPE (HCC): ICD-10-CM

## 2021-09-03 LAB
ALBUMIN SERPL BCP-MCNC: 3.8 G/DL (ref 3.2–4.9)
ALBUMIN/GLOB SERPL: 1.3 G/DL
ALP SERPL-CCNC: 104 U/L (ref 30–99)
ALT SERPL-CCNC: 18 U/L (ref 2–50)
ANION GAP SERPL CALC-SCNC: 9 MMOL/L (ref 7–16)
AST SERPL-CCNC: 18 U/L (ref 12–45)
BILIRUB SERPL-MCNC: 0.7 MG/DL (ref 0.1–1.5)
BUN SERPL-MCNC: 12 MG/DL (ref 8–22)
CALCIUM SERPL-MCNC: 9.4 MG/DL (ref 8.5–10.5)
CHLORIDE SERPL-SCNC: 105 MMOL/L (ref 96–112)
CHOLEST SERPL-MCNC: 142 MG/DL (ref 100–199)
CO2 SERPL-SCNC: 28 MMOL/L (ref 20–33)
CREAT SERPL-MCNC: 0.73 MG/DL (ref 0.5–1.4)
FASTING STATUS PATIENT QL REPORTED: NORMAL
GLOBULIN SER CALC-MCNC: 2.9 G/DL (ref 1.9–3.5)
GLUCOSE SERPL-MCNC: 132 MG/DL (ref 65–99)
HDLC SERPL-MCNC: 56 MG/DL
LDLC SERPL CALC-MCNC: 66 MG/DL
POTASSIUM SERPL-SCNC: 4.8 MMOL/L (ref 3.6–5.5)
PROT SERPL-MCNC: 6.7 G/DL (ref 6–8.2)
SODIUM SERPL-SCNC: 142 MMOL/L (ref 135–145)
TRIGL SERPL-MCNC: 100 MG/DL (ref 0–149)

## 2021-09-03 PROCEDURE — 80061 LIPID PANEL: CPT

## 2021-09-03 PROCEDURE — 36415 COLL VENOUS BLD VENIPUNCTURE: CPT

## 2021-09-03 PROCEDURE — 80053 COMPREHEN METABOLIC PANEL: CPT

## 2021-09-03 RX ORDER — DABIGATRAN ETEXILATE 150 MG/1
CAPSULE ORAL
Qty: 180 CAPSULE | Refills: 3 | Status: SHIPPED
Start: 2021-09-03 | End: 2021-09-14 | Stop reason: SDUPTHER

## 2021-09-14 DIAGNOSIS — I48.91 ATRIAL FIBRILLATION, UNSPECIFIED TYPE (HCC): ICD-10-CM

## 2021-09-14 RX ORDER — DABIGATRAN ETEXILATE 150 MG/1
CAPSULE ORAL
Qty: 180 CAPSULE | Refills: 3 | Status: SHIPPED
Start: 2021-09-14 | End: 2022-10-25 | Stop reason: SDUPTHER

## 2021-09-16 ENCOUNTER — TELEPHONE (OUTPATIENT)
Dept: MEDICAL GROUP | Facility: PHYSICIAN GROUP | Age: 71
End: 2021-09-16

## 2021-09-16 NOTE — TELEPHONE ENCOUNTER
ESTABLISHED PATIENT PRE-VISIT PLANNING     Patient was NOT contacted to complete PVP.     Note: Patient will not be contacted if there is no indication to call.     1.  Reviewed notes from the last few office visits within the medical group: Yes    2.  If any orders were placed at last visit or intended to be done for this visit (i.e. 6 mos follow-up), do we have Results/Consult Notes?         •  Labs - Labs ordered, NOT completed. Patient advised to complete prior to next appointment.  Note: If patient appointment is for lab review and patient did not complete labs, check with provider if OK to reschedule patient until labs completed.       •  Imaging - Imaging was not ordered at last office visit.       •  Referrals - No referrals were ordered at last office visit.    3. Is this appointment scheduled as a Hospital Follow-Up? No    4.  Immunizations were updated in Epic using Reconcile Outside Information activity? Yes    5.  Patient is due for the following Health Maintenance Topics:   Health Maintenance Due   Topic Date Due   • RETINAL SCREENING  Never done   • IMM ZOSTER VACCINES (1 of 2) Never done   • A1C SCREENING  03/14/2021   • IMM INFLUENZA (1) Never done   • URINE ACR / MICROALBUMIN  09/14/2021     6.  AHA (Pulse8) form printed for Provider? No, patient does not have any open alerts

## 2021-09-20 ENCOUNTER — OFFICE VISIT (OUTPATIENT)
Dept: MEDICAL GROUP | Facility: PHYSICIAN GROUP | Age: 71
End: 2021-09-20
Payer: MEDICARE

## 2021-09-20 VITALS
HEART RATE: 53 BPM | HEIGHT: 63 IN | BODY MASS INDEX: 31.45 KG/M2 | WEIGHT: 177.5 LBS | SYSTOLIC BLOOD PRESSURE: 128 MMHG | TEMPERATURE: 97.4 F | RESPIRATION RATE: 12 BRPM | OXYGEN SATURATION: 99 % | DIASTOLIC BLOOD PRESSURE: 60 MMHG

## 2021-09-20 DIAGNOSIS — E11.9 TYPE 2 DIABETES MELLITUS WITHOUT COMPLICATION, WITHOUT LONG-TERM CURRENT USE OF INSULIN (HCC): ICD-10-CM

## 2021-09-20 DIAGNOSIS — I10 ESSENTIAL (PRIMARY) HYPERTENSION: ICD-10-CM

## 2021-09-20 DIAGNOSIS — I48.0 PAROXYSMAL ATRIAL FIBRILLATION (HCC): ICD-10-CM

## 2021-09-20 DIAGNOSIS — R74.8 ELEVATED ALKALINE PHOSPHATASE LEVEL: ICD-10-CM

## 2021-09-20 DIAGNOSIS — E78.2 MIXED HYPERLIPIDEMIA: ICD-10-CM

## 2021-09-20 PROCEDURE — 99214 OFFICE O/P EST MOD 30 MIN: CPT | Performed by: INTERNAL MEDICINE

## 2021-09-20 ASSESSMENT — FIBROSIS 4 INDEX: FIB4 SCORE: 1.03

## 2021-09-20 NOTE — ASSESSMENT & PLAN NOTE
Chronic and ongoing problem, values are improved with transition from lovastatin to atorvastatin.  Continue atorvastatin 40 mg nightly.

## 2021-09-20 NOTE — LETTER
[facility]  Temitope Bess D.O.  [address]  [place] [address]  Fax: [phone]   Authorization for Release/Disclosure of   Protected Health Information   Name: ALVARO ARTEAGA : 1950 SSN: [id number]   Address: [facility] [place] [address] Phone:    [phone] (home) [phone] (work)   I authorize the entity listed below to release/disclose the PHI below to:   [facility]/Temitope Bess D.O. and Temitope Bess D.O.   Provider or Entity Name:  Dr. [facility]     Address   [facility]   Phone:  [phone]    Fax:  [phone]   Reason for request: continuity of care   Information to be released:    [  ] LAST COLONOSCOPY,  including any PATH REPORT and follow-up  [  ] LAST FIT/COLOGUARD RESULT [  ] LAST DEXA  [  ] LAST MAMMOGRAM  [  ] LAST PAP  [  ] LAST LABS [  ] RETINA EXAM REPORT  [  ] IMMUNIZATION RECORDS  [  ] Release all info      [  ] Check here and initial the line next to each item to release ALL health information INCLUDING  _____ Care and treatment for drug and / or alcohol abuse  _____ HIV testing, infection status, or AIDS  _____ Genetic Testing    DATES OF SERVICE OR TIME PERIOD TO BE DISCLOSED: _____________  I understand and acknowledge that:  * This Authorization may be revoked at any time by you in writing, except if your health information has already been used or disclosed.  * Your health information that will be used or disclosed as a result of you signing this authorization could be re-disclosed by the recipient. If this occurs, your re-disclosed health information may no longer be protected by State or Federal laws.  * You may refuse to sign this Authorization. Your refusal will not affect your ability to obtain treatment.  * This Authorization becomes effective upon signing and will  on (date) __________.      If no date is indicated, this Authorization will  one (1) year from the signature date.    Name: Alvaro  Axel Loges    Signature:   Date:     9/20/2021       PLEASE FAX REQUESTED RECORDS BACK TO: [phone]

## 2021-09-20 NOTE — ASSESSMENT & PLAN NOTE
Chronic and ongoing problem, blood pressure is at goal, continue current regimen with metoprolol tartrate and lisinopril.

## 2021-09-20 NOTE — ASSESSMENT & PLAN NOTE
New problem, noted incidentally on lab work.  We will start with repeating with alkaline phosphatase isoenzymes to determine etiology and can discuss additional work-up at that time.  Once labs are back we will also fax a copy to her oncology surgeon, Dr. Wylie.

## 2021-09-20 NOTE — ASSESSMENT & PLAN NOTE
Chronic and ongoing problem.  Continues to have paroxysmal atrial fibrillation with occasional palpitations.  Now established with Dr. Garcia.  Rate control approach of metoprolol tartrate and systemic anticoagulation with Pradaxa.  She reports financial constraints paying for the Pradaxa, will reach out to [facility] to see if they have any additional assistance to provide her.

## 2021-09-20 NOTE — LETTER
[facility]  Temitope Bess D.O.  [address]  [place] [address]  Fax: [phone]   Authorization for Release/Disclosure of   Protected Health Information   Name: ALVARO ARTEAGA : 1950 SSN: [id number]   Address: [facility] [place] [address] Phone:    [phone] (home) [phone] (work)   I authorize the entity listed below to release/disclose the PHI below to:   [facility]/Temitope Bess D.O. and Temitope Bess D.O.   Provider or Entity Name:  Dr. Wylie   [facility]   City, [facility]   Phone:      Fax:     Reason for request: continuity of care   Information to be released:    [  ] LAST COLONOSCOPY,  including any PATH REPORT and follow-up  [  ] LAST FIT/COLOGUARD RESULT [  ] LAST DEXA  [  ] LAST MAMMOGRAM  [  ] LAST PAP  [  ] LAST LABS [  ] RETINA EXAM REPORT  [  ] IMMUNIZATION RECORDS  [ x ] Release all info      [ x ] Check here and initial the line next to each item to release ALL health information INCLUDING  _____ Care and treatment for drug and / or alcohol abuse  _____ HIV testing, infection status, or AIDS  _____ Genetic Testing    DATES OF SERVICE OR TIME PERIOD TO BE DISCLOSED: _-_____  I understand and acknowledge that:  * This Authorization may be revoked at any time by you in writing, except if your health information has already been used or disclosed.  * Your health information that will be used or disclosed as a result of you signing this authorization could be re-disclosed by the recipient. If this occurs, your re-disclosed health information may no longer be protected by State or Federal laws.  * You may refuse to sign this Authorization. Your refusal will not affect your ability to obtain treatment.  * This Authorization becomes effective upon signing and will  on (date) __________.      If no date is indicated, this Authorization will  one (1) year from the signature date.    Name: Alvaro Arteaga    Signature:   Date:      9/20/2021       PLEASE FAX REQUESTED RECORDS BACK TO: [phone]

## 2021-09-20 NOTE — PROGRESS NOTES
Subjective:   Chief Complaint/History of Present Illness:  Cristela Alan is a 70 y.o. female established patient who presents today to discuss medical problems as listed below. Cristela is unaccompanied for today's visit.    Problem   Elevated Alkaline Phosphatase Level       Ref. Range 3/18/2021 08:22 9/3/2021 08:23   Alkaline Phosphatase Latest Ref Range: 30 - 99 U/L 85 104 (H)     New finding of mildly elevated alkaline phosphatase.  She has history of endometrial cancer.  She has osteopenia with elevated FRAX.  No known primary liver disease.     Type 2 Diabetes Mellitus Without Complication, Without Long-Term Current Use of Insulin (Hcc)       Ref. Range 5/30/2019 08:21 9/14/2020 07:58   Glycohemoglobin Latest Ref Range: 0.0 - 5.6 % 6.6 (H) 6.1 (H)   Estim. Avg Glu Latest Units: mg/dL 143 128      Ref. Range 9/14/2020 07:58   Micro Alb Creat Ratio Latest Ref Range: 0 - 30 mg/g see below   Creatinine, Urine Latest Units: mg/dL 100.84   Microalbumin, Urine Random Latest Units: mg/dL <1.2       She technically met criteria for type 2 diabetes as of May 2019.  Does not appear this diagnosis was ever given to the patient.  She has never taken any glucose lowering medications.  She is on statin therapy for history of elevated cholesterol and TIA and she is also on ACE inhibitor for history of hypertension.  No known history of retinopathy, nephropathy, neuropathy.      Hyperlipidemia       Ref. Range 3/18/2021 08:22 9/3/2021 08:23   Cholesterol,Tot Latest Ref Range: 100 - 199 mg/dL 170 142   Triglycerides Latest Ref Range: 0 - 149 mg/dL 115 100   HDL Latest Ref Range: >=40 mg/dL 63 56   LDL Latest Ref Range: <100 mg/dL 84 66     She has history of elevated cholesterol in the past.  She is taking statin which will be an ongoing recommendation due to history of mini strokes, atrial fibrillation, and impaired fasting glucose.  No myalgias or GI upset on this medication.    Current regimen: Atorvastatin 40 mg nightly      Atrial Fibrillation (Hcc)    Results for orders placed or performed in visit on 21   EKG   Result Value Ref Range    Report       [facility]    Test Date:  2021  Pt Name:    ALVARO ARTEAGA                  Department: [facility]  MRN:        [id number]                      Room:  Gender:     Female                       Technician: MICHAEL  :        1950                   Requested By:JOVANNY GARCIA  Order #:    [id number]                    Reading MD: Jovanny Garcia MD    Measurements  Intervals                                Axis  Rate:       57                           P:          3  NV:         144                          QRS:        19  QRSD:       72                           T:          15  QT:         400  QTc:        390    Interpretive Statements  SINUS BRADYCARDIA  Compared to ECG 2020 14:52:12  Sinus rhythm no longer present  Electronically Signed On 6-3-2021 16:08:15 PDT by Jovanny Garcia MD         She reports waking up one morning and experiencing palpitations.  Upon further evaluation she was found to be in atrial fibrillation.  This was in 2012.  She was treated with cardioversion and since that time has maintained normal sinus rhythm.  She notes very occasionally she will feel palpitations, and thus it is labeled as paroxysmal atrial fibrillation.  She has a rate control approach with metoprolol tartrate 25 mg twice daily.  She is also on Pradaxa for systemic anticoagulation and stroke risk management.  She follows with cardiology, Dr. Avery, annually, and more recently Dr. Garcia with EP twice annually.    Regimen: metoprolol tartrate 25 mg twice daily and Pradaxa 150 mg daily       Essential (Primary) Hypertension    She has a longstanding history of hypertension.  She is on dual therapy.  She has diagnosis of paroxysmal atrial fibrillation and diastolic dysfunction.  Most recent echocardiogram was stable and EKG has maintained normal sinus rhythm.  No  "chest pain or pressure, no dyspnea on exertion, no presyncope or syncope.  Electrolytes and kidney function are stable.  No signs or symptoms of orthostasis.    Blood pressure in clinic ranges 116-128/60-84    Current regimen: Lisinopril 5 mg daily and metoprolol tartrate 25 mg twice daily          Current Medications:  Current Outpatient Medications Ordered in Epic   Medication Sig Dispense Refill   • dabigatran (PRADAXA) 150 MG Cap capsule TAKE ONE CAPSULE BY MOUTH TWICE DAILY 180 Capsule 3   • lisinopril (PRINIVIL) 5 MG Tab Take 1 tablet by mouth every day. 100 tablet 3   • metoprolol tartrate (LOPRESSOR) 25 MG Tab TAKE ONE TABLET BY MOUTH TWICE DAILY  200 tablet 2   • atorvastatin (LIPITOR) 40 MG Tab Take 1 tablet by mouth every evening. 90 tablet 3   • Multiple Vitamins-Minerals (PRESERVISION AREDS PO) Take  by mouth.     • Cholecalciferol (VITAMIN D HIGH POTENCY PO) Take 2,000 mg by mouth every day.     • Polyethylene Glycol 400 (BLINK TEARS OP) by Ophthalmic route every day.     • Calcium 600 MG TABS Take 1 Tab by mouth every morning. Taking 1000mg  Indications: per pt is taking 500 mg     • multivitamin (THERAGRAN) TABS Take 1 Tab by mouth every morning.       • Polyethyl Glycol-Propyl Glycol (SYSTANE OP) Place 1 Drop in both eyes every bedtime. Indications: **OTC**       No current Epic-ordered facility-administered medications on file.          Objective:   Physical Exam:    Vitals: /60 (BP Location: Left arm, Patient Position: Sitting, BP Cuff Size: Adult)   Pulse (!) 53   Temp 36.3 °C (97.4 °F) (Temporal)   Resp 12   Ht 1.6 m (5' 3\")   Wt 80.5 kg (177 lb 8 oz)   SpO2 99%    BMI: Body mass index is 31.44 kg/m².  Physical Exam  Constitutional:       General: She is not in acute distress.     Appearance: Normal appearance. She is not ill-appearing.   HENT:      Right Ear: Tympanic membrane and ear canal normal. There is no impacted cerumen.      Left Ear: Tympanic membrane and ear canal normal. " There is no impacted cerumen.   Eyes:      General: No scleral icterus.     Conjunctiva/sclera: Conjunctivae normal.   Cardiovascular:      Rate and Rhythm: Normal rate and regular rhythm.      Pulses: Normal pulses.      Heart sounds: No murmur heard.     Pulmonary:      Effort: Pulmonary effort is normal. No respiratory distress.      Breath sounds: Normal breath sounds. No wheezing or rhonchi.   Musculoskeletal:      Right lower leg: No edema.      Left lower leg: No edema.   Skin:     Findings: No rash.   Psychiatric:         Mood and Affect: Mood normal.         Behavior: Behavior normal.         Thought Content: Thought content normal.         Judgment: Judgment normal.          Assessment and Plan:   Cristela is a 70 y.o. female with the following:  Problem List Items Addressed This Visit     Atrial fibrillation (HCC)     Chronic and ongoing problem.  Continues to have paroxysmal atrial fibrillation with occasional palpitations.  Now established with Dr. Garcia.  Rate control approach of metoprolol tartrate and systemic anticoagulation with Pradaxa.  She reports financial constraints paying for the Pradaxa, will reach out to [facility] to see if they have any additional assistance to provide her.         Essential (primary) hypertension     Chronic and ongoing problem, blood pressure is at goal, continue current regimen with metoprolol tartrate and lisinopril.         Hyperlipidemia     Chronic and ongoing problem, values are improved with transition from lovastatin to atorvastatin.  Continue atorvastatin 40 mg nightly.         Type 2 diabetes mellitus without complication, without long-term current use of insulin (HCC)     Chronic ongoing problem, unfortunately the lab did not draw the A1c or collect the urine microalbumin from the orders I gave her in March.  This is not fasting she will plan to go later this week to get the stone so we can follow-up on her diabetes which is currently diet controlled.   Additional recommendations to follow.  Continue statin and ACEI.         Elevated alkaline phosphatase level     New problem, noted incidentally on lab work.  We will start with repeating with alkaline phosphatase isoenzymes to determine etiology and can discuss additional work-up at that time.  Once labs are back we will also fax a copy to her oncology surgeon, Dr. Wylie.         Relevant Orders    ALKALINE PHOSPHATASE ISOENZYMES           RTC: Return in about 6 months (around 3/20/2022).    I spent a total of 34 minutes with record review, exam, communication with the patient, communication with other providers, and documentation of this encounter.    PLEASE NOTE: This dictation was created using voice recognition software. I have made every reasonable attempt to correct obvious errors, but I expect that there are errors of grammar and possibly content that I did not discover before finalizing the note.      Temitope Bess, DO  Geriatric and Internal Medicine  [facility]

## 2021-10-01 ENCOUNTER — HOSPITAL ENCOUNTER (OUTPATIENT)
Dept: LAB | Facility: MEDICAL CENTER | Age: 71
End: 2021-10-01
Attending: INTERNAL MEDICINE
Payer: MEDICARE

## 2021-10-01 DIAGNOSIS — E55.9 VITAMIN D DEFICIENCY: ICD-10-CM

## 2021-10-01 DIAGNOSIS — E11.9 TYPE 2 DIABETES MELLITUS WITHOUT COMPLICATION, WITHOUT LONG-TERM CURRENT USE OF INSULIN (HCC): ICD-10-CM

## 2021-10-01 DIAGNOSIS — E78.2 MIXED HYPERLIPIDEMIA: ICD-10-CM

## 2021-10-01 DIAGNOSIS — R74.8 ELEVATED ALKALINE PHOSPHATASE LEVEL: ICD-10-CM

## 2021-10-01 LAB
25(OH)D3 SERPL-MCNC: 62 NG/ML (ref 30–100)
BASOPHILS # BLD AUTO: 1.1 % (ref 0–1.8)
BASOPHILS # BLD: 0.07 K/UL (ref 0–0.12)
CREAT UR-MCNC: 92.55 MG/DL
EOSINOPHIL # BLD AUTO: 0.11 K/UL (ref 0–0.51)
EOSINOPHIL NFR BLD: 1.8 % (ref 0–6.9)
ERYTHROCYTE [DISTWIDTH] IN BLOOD BY AUTOMATED COUNT: 48.5 FL (ref 35.9–50)
EST. AVERAGE GLUCOSE BLD GHB EST-MCNC: 146 MG/DL
HBA1C MFR BLD: 6.7 % (ref 4–5.6)
HCT VFR BLD AUTO: 42.1 % (ref 37–47)
HGB BLD-MCNC: 13.8 G/DL (ref 12–16)
IMM GRANULOCYTES # BLD AUTO: 0.02 K/UL (ref 0–0.11)
IMM GRANULOCYTES NFR BLD AUTO: 0.3 % (ref 0–0.9)
LYMPHOCYTES # BLD AUTO: 1.01 K/UL (ref 1–4.8)
LYMPHOCYTES NFR BLD: 16.3 % (ref 22–41)
MCH RBC QN AUTO: 30.1 PG (ref 27–33)
MCHC RBC AUTO-ENTMCNC: 32.8 G/DL (ref 33.6–35)
MCV RBC AUTO: 91.9 FL (ref 81.4–97.8)
MICROALBUMIN UR-MCNC: <1.2 MG/DL
MICROALBUMIN/CREAT UR: NORMAL MG/G (ref 0–30)
MONOCYTES # BLD AUTO: 0.58 K/UL (ref 0–0.85)
MONOCYTES NFR BLD AUTO: 9.4 % (ref 0–13.4)
NEUTROPHILS # BLD AUTO: 4.41 K/UL (ref 2–7.15)
NEUTROPHILS NFR BLD: 71.1 % (ref 44–72)
NRBC # BLD AUTO: 0 K/UL
NRBC BLD-RTO: 0 /100 WBC
PLATELET # BLD AUTO: 291 K/UL (ref 164–446)
PMV BLD AUTO: 9.7 FL (ref 9–12.9)
RBC # BLD AUTO: 4.58 M/UL (ref 4.2–5.4)
WBC # BLD AUTO: 6.2 K/UL (ref 4.8–10.8)

## 2021-10-01 PROCEDURE — 36415 COLL VENOUS BLD VENIPUNCTURE: CPT

## 2021-10-01 PROCEDURE — 83036 HEMOGLOBIN GLYCOSYLATED A1C: CPT

## 2021-10-01 PROCEDURE — 82043 UR ALBUMIN QUANTITATIVE: CPT

## 2021-10-01 PROCEDURE — 82570 ASSAY OF URINE CREATININE: CPT

## 2021-10-01 PROCEDURE — 84075 ASSAY ALKALINE PHOSPHATASE: CPT

## 2021-10-01 PROCEDURE — 85025 COMPLETE CBC W/AUTO DIFF WBC: CPT

## 2021-10-01 PROCEDURE — 82306 VITAMIN D 25 HYDROXY: CPT

## 2021-10-01 PROCEDURE — 84080 ASSAY ALKALINE PHOSPHATASES: CPT

## 2021-10-04 ENCOUNTER — TELEPHONE (OUTPATIENT)
Dept: MEDICAL GROUP | Facility: PHYSICIAN GROUP | Age: 71
End: 2021-10-04

## 2021-10-04 LAB
ALP BONE SERPL-CCNC: 36 U/L (ref 0–55)
ALP ISOS SERPL HS-CCNC: 0 U/L
ALP LIVER SERPL-CCNC: 66 U/L (ref 0–94)
ALP SERPL-CCNC: 102 U/L (ref 40–120)

## 2021-10-04 NOTE — TELEPHONE ENCOUNTER
1. Caller Name: Cristela Reyna Loges                          Call Back Number: [phone] (home) [phone] (work)        How would the patient prefer to be contacted with a response: Phone call OK to leave a detailed message    Cristela called and left a vm on Friday at 4:20pm.  Her cat bit her hand and it swelled up.  We are not in clinic on Friday so I did not get this vm until Monday morning.  I called her and asked if she had gone to UC?  She had not but reports swelling has gone down and she can bend her wrist and see her knuckles now.  She thinks she is on the recovery path and doesn't need to be seen anymore

## 2021-10-05 DIAGNOSIS — E11.9 TYPE 2 DIABETES MELLITUS WITHOUT COMPLICATION, WITHOUT LONG-TERM CURRENT USE OF INSULIN (HCC): ICD-10-CM

## 2021-10-05 RX ORDER — LANCETS 30 GAUGE
EACH MISCELLANEOUS
Qty: 100 EACH | Refills: 3 | Status: SHIPPED | OUTPATIENT
Start: 2021-10-05 | End: 2023-07-30 | Stop reason: SDUPTHER

## 2021-10-05 NOTE — TELEPHONE ENCOUNTER
Called pt to discuss recent elevated A1C levels. Pt agreeable to placing order for glucometer, test strips, lancets. I will call pt in a couple days to schedule an RN visit to go over BS checks- once pt gets the supplies.     Pt also waiting on Pradaxa approval through pt assistance program. Will f/u with that in a few days to see if pt was approved.     Faxed copy of 10/1/21 labs over to Dr. Wylie's office. Scanned confirmation into [employer].

## 2021-12-14 ENCOUNTER — TELEPHONE (OUTPATIENT)
Dept: HEALTH INFORMATION MANAGEMENT | Facility: OTHER | Age: 71
End: 2021-12-14

## 2021-12-14 NOTE — TELEPHONE ENCOUNTER
Called patient to schedule COMPREHENSIVE HEALTH  ASSESSMENT.  Pt declined and stated that her  has COVID, not a good time for her to schedule any appointments.

## 2021-12-30 ENCOUNTER — OFFICE VISIT (OUTPATIENT)
Dept: CARDIOLOGY | Facility: MEDICAL CENTER | Age: 71
End: 2021-12-30
Payer: MEDICARE

## 2021-12-30 VITALS
HEIGHT: 63 IN | OXYGEN SATURATION: 96 % | RESPIRATION RATE: 14 BRPM | SYSTOLIC BLOOD PRESSURE: 108 MMHG | WEIGHT: 175.8 LBS | DIASTOLIC BLOOD PRESSURE: 64 MMHG | HEART RATE: 80 BPM | BODY MASS INDEX: 31.15 KG/M2

## 2021-12-30 DIAGNOSIS — I48.91 ATRIAL FIBRILLATION, UNSPECIFIED TYPE (HCC): ICD-10-CM

## 2021-12-30 PROCEDURE — 99214 OFFICE O/P EST MOD 30 MIN: CPT | Performed by: INTERNAL MEDICINE

## 2021-12-30 ASSESSMENT — FIBROSIS 4 INDEX: FIB4 SCORE: 1.06

## 2021-12-30 NOTE — PROGRESS NOTES
"Arrhythmia Clinic Note (Established patient)    DOS: 12/30/2021    Chief complaint/Reason for consult: pAF    Interval History: 70 y/o F with pAF on Pradaxa and metoprolol. Not having too much Afib anymore, episodes come on and last only a couple minutes. No other complaints.    ROS (+ highlighted in bold):  Constitutional: Fevers/chills/fatigue/weightloss  HEENT: Blurry vision/eye pain/sore throat/hearing loss  Respiratory: Shortness of breath/cough  Cardiovascular: Chest pain/palpitations/edema/orthopnea/syncope  GI: Nausea/vomitting/diarrhea  MSK: Arthralgias/myagias/muscle weakness  Skin: Rash/sores  Neurological: Numbness/tremors/vertigo  Endocrine: Excessive thirst/polyuria/cold intolerance/heat intolerance  Psych: Depression/anxiety    Past Medical History:   Diagnosis Date   • Allergic rhinitis    • Anesthesia     hypotension   • Atrial fibrillation (HCC)    • Cancer (HCC) 2014    endometrial   • CATARACT     bilateral lens implants   • Cholesterol blood decreased    • Dental disorder     dentures   • Dermatitis, contact    • Dyslipidemia 6/30/2016   • Encounter for general adult medical examination without abnormal findings 12/2/2014    [place] for GI No flu shots- egg allergies  Declines all vaccines- had reaction to tdap   • Endometrium cancer (HCC)    • Hyperlipidemia    • Hypertension    • Impaired glucose tolerance    • Macular degeneration    • Obesity    • Pneumonia     had PNU 35 years ago   • Prolapsed uterus 8/2014    current issue   • Pulmonary nodule 11/9/2016    No history of tobacco use CT 9/16, 12/16- no change-6/17- ? New nodule>>PET CT-10/17 neg    • Stroke (HCC)     \"mini strokes\" TIA, no residual   • TIA (transient ischemic attack)    • Unspecified hemorrhagic conditions     NOSE BLEEDS/on Plavix   • Vitamin D deficiency        Past Surgical History:   Procedure Laterality Date   • HYSTERECTOMY ROBOTIC XI  8/28/2014    Performed by Pantera Wylie M.D. at SURGERY [facility] ORS   • LYMPH NODE " "DISSECTION ROBOTIC XI  8/28/2014    Performed by Pantera Wylie M.D. at SURGERY [facility] ORS   • CATARACT PHACO WITH IOL  8/19/2010    Performed by DAVE AGUILAR at SURGERY SAME DAY [facility] ORS   • CATARACT PHACO WITH IOL  8/5/2010    Performed by DAVE AGUILAR at SURGERY SAME DAY [facility] ORS   • HIP ARTHROSCOPY  5/12/08    Performed by ZINA HOLLIS at SURGERY [facility] ORS   • ACETABULAR OSTEOTOMY  5/12/08    Performed by ZINA HOLLIS at SURGERY [facility] ORS   • ORTHOPEDIC OSTEOTOMY  5/12/08    Performed by ZINA HOLLIS at SURGERY [facility] ORS   • NASAL POLYPECTOMY  1994    \"removal of papilloma\"   • ABDOMINAL HYSTERECTOMY TOTAL     • EYE SURGERY         Social History     Socioeconomic History   • Marital status:      Spouse name: Not on file   • Number of children: Not on file   • Years of education: Not on file   • Highest education level: Not on file   Occupational History   • Not on file   Tobacco Use   • Smoking status: Never Smoker   • Smokeless tobacco: Never Used   • Tobacco comment: continued abstinence   Vaping Use   • Vaping Use: Never used   Substance and Sexual Activity   • Alcohol use: No   • Drug use: No   • Sexual activity: Not Currently   Other Topics Concern   • Not on file   Social History Narrative    She is  to Art for 49 years, they met at a [employer]. She has a daughter Nikki (43, [place]) and she has 2 kids. She retired in her late 60's. 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     Social Determinants of Health     Financial Resource Strain:    • Difficulty of Paying Living Expenses: Not on file   Food Insecurity:    • Worried About Running Out of Food in the Last Year: Not on file   • Ran Out of Food in the Last Year: Not on file   Transportation Needs:    • Lack of Transportation (Medical): Not on file   • Lack of Transportation (Non-Medical): Not on file   Physical Activity:    • Days of Exercise per Week: Not on file   • " "Minutes of Exercise per Session: Not on file   Stress:    • Feeling of Stress : Not on file   Social Connections:    • Frequency of Communication with Friends and Family: Not on file   • Frequency of Social Gatherings with Friends and Family: Not on file   • Attends Shinto Services: Not on file   • Active Member of Clubs or Organizations: Not on file   • Attends Club or Organization Meetings: Not on file   • Marital Status: Not on file   Intimate Partner Violence:    • Fear of Current or Ex-Partner: Not on file   • Emotionally Abused: Not on file   • Physically Abused: Not on file   • Sexually Abused: Not on file   Housing Stability:    • Unable to Pay for Housing in the Last Year: Not on file   • Number of Places Lived in the Last Year: Not on file   • Unstable Housing in the Last Year: Not on file       Family History   Problem Relation Age of Onset   • Hyperlipidemia Mother    • Hypertension Mother    • Cancer Mother    • No Known Problems Father    • Heart Disease Sister    • Alcohol/Drug Sister    • Hypertension Sister    • Heart Attack Sister    • Cancer Sister 66        ? breast cancer as well   • Hypertension Sister    • Hyperlipidemia Sister    • Cancer Other    • Hypertension Other    • Diabetes Maternal Grandmother         elderly   • Other Sister         suicide    • Stroke Neg Hx        Allergies   Allergen Reactions   • Fentanyl Anaphylaxis   • Flu Virus Vaccine    • Versed Shortness of Breath and Vomiting   • Bee      And  spider--- tongue swelling   • Codeine      Increased heart rate   • Eggs Vomiting   • Lidocaine Hcl      \"severe drop in BP\"   • Other Drug      Novocaine hypotension ALL \"MELISA\"   • Penicillins      Increased heart rate   • Soap Rash     Laundry Tide soap, Dove and Ivory soap   • Tape Rash     Paper tape OK   • Xylocaine [Lidocaine Hcl, Local Anesth.]      All \"MELISA\" hypotension       Current Outpatient Medications   Medication Sig Dispense Refill   • glucose blood strip 1 " "Strip by Other route as needed. 100 Strip 3   • Lancets Lancets order: Lancets for Abbott Freestyle Lite meter. Sig: use once daily and prn ssx high or low sugar. #100 RF x 3 100 Each 3   • dabigatran (PRADAXA) 150 MG Cap capsule TAKE ONE CAPSULE BY MOUTH TWICE DAILY 180 Capsule 3   • lisinopril (PRINIVIL) 5 MG Tab Take 1 tablet by mouth every day. 100 tablet 3   • metoprolol tartrate (LOPRESSOR) 25 MG Tab TAKE ONE TABLET BY MOUTH TWICE DAILY  200 tablet 2   • atorvastatin (LIPITOR) 40 MG Tab Take 1 tablet by mouth every evening. 90 tablet 3   • Multiple Vitamins-Minerals (PRESERVISION AREDS PO) Take  by mouth.     • Cholecalciferol (VITAMIN D HIGH POTENCY PO) Take 2,000 mg by mouth every day.     • Polyethylene Glycol 400 (BLINK TEARS OP) by Ophthalmic route every day.     • Calcium 600 MG TABS Take 1 Tab by mouth every morning. Taking 1000mg  Indications: per pt is taking 500 mg     • multivitamin (THERAGRAN) TABS Take 1 Tab by mouth every morning.       • Polyethyl Glycol-Propyl Glycol (SYSTANE OP) Place 1 Drop in both eyes every bedtime. Indications: **OTC**       No current facility-administered medications for this visit.       Physical Exam:  Vitals:    12/30/21 1238   BP: 108/64   BP Location: Left arm   Patient Position: Sitting   BP Cuff Size: Adult   Pulse: 80   Resp: 14   SpO2: 96%   Weight: 79.7 kg (175 lb 12.8 oz)   Height: 1.6 m (5' 3\")     General appearance: NAD, conversant   Eyes: anicteric sclerae, moist conjunctivae; no lid-lag; PERRLA  HENT: Atraumatic; oropharynx clear with moist mucous membranes and no mucosal ulcerations; normal hard and soft palate  Neck: Trachea midline; FROM, supple, no thyromegaly or lymphadenopathy  Lungs: CTA, with normal respiratory effort and no intercostal retractions  CV: RRR, no MRGs, no JVD  Abdomen: Soft, non-tender; no masses or HSM  Extremities: No peripheral edema or extremity lymphadenopathy  Skin: Normal temperature, turgor and texture; no rash, ulcers or " subcutaneous nodules  Psych: Appropriate affect, alert and oriented to person, place and time    Data:  Lipids:   Lab Results   Component Value Date/Time    CHOLSTRLTOT 142 09/03/2021 08:23 AM    TRIGLYCERIDE 100 09/03/2021 08:23 AM    HDL 56 09/03/2021 08:23 AM    LDL 66 09/03/2021 08:23 AM        BMP:  Lab Results   Component Value Date/Time    SODIUM 142 09/03/2021 0823    POTASSIUM 4.8 09/03/2021 0823    CHLORIDE 105 09/03/2021 0823    CO2 28 09/03/2021 0823    GLUCOSE 132 (H) 09/03/2021 0823    BUN 12 09/03/2021 0823    CREATININE 0.73 09/03/2021 0823    CALCIUM 9.4 09/03/2021 0823    ANION 9.0 09/03/2021 0823        TSH:   Lab Results   Component Value Date/Time    TSHULTRASEN 1.010 06/01/2015 0814        THYROXINE (T4):   No results found for: DARLIN     CBC:   Lab Results   Component Value Date/Time    WBC 6.2 10/01/2021 08:39 AM    RBC 4.58 10/01/2021 08:39 AM    HEMOGLOBIN 13.8 10/01/2021 08:39 AM    HEMATOCRIT 42.1 10/01/2021 08:39 AM    MCV 91.9 10/01/2021 08:39 AM    MCH 30.1 10/01/2021 08:39 AM    MCHC 32.8 (L) 10/01/2021 08:39 AM    RDW 48.5 10/01/2021 08:39 AM    PLATELETCT 291 10/01/2021 08:39 AM    MPV 9.7 10/01/2021 08:39 AM    NEUTSPOLYS 71.10 10/01/2021 08:39 AM    LYMPHOCYTES 16.30 (L) 10/01/2021 08:39 AM    MONOCYTES 9.40 10/01/2021 08:39 AM    EOSINOPHILS 1.80 10/01/2021 08:39 AM    BASOPHILS 1.10 10/01/2021 08:39 AM    IMMGRAN 0.30 10/01/2021 08:39 AM    NRBC 0.00 10/01/2021 08:39 AM    NEUTS 4.41 10/01/2021 08:39 AM    LYMPHS 1.01 10/01/2021 08:39 AM    MONOS 0.58 10/01/2021 08:39 AM    EOS 0.11 10/01/2021 08:39 AM    BASO 0.07 10/01/2021 08:39 AM    IMMGRANAB 0.02 10/01/2021 08:39 AM    NRBCAB 0.00 10/01/2021 08:39 AM        CBC w/o DIFF  Lab Results   Component Value Date/Time    WBC 6.2 10/01/2021 08:39 AM    RBC 4.58 10/01/2021 08:39 AM    HEMOGLOBIN 13.8 10/01/2021 08:39 AM    MCV 91.9 10/01/2021 08:39 AM    MCH 30.1 10/01/2021 08:39 AM    MCHC 32.8 (L) 10/01/2021 08:39 AM    RDW 48.5  10/01/2021 08:39 AM    MPV 9.7 10/01/2021 08:39 AM       Prior echo/stress reviewed: Normal LV function. No ischemia.    EKG interpreted by me: NSR    Impression/Plan:  1. Atrial fibrillation, unspecified type (HCC)  EKG     1. pAF  2. OAC management, Pradaxa    - Labs reviewed, continue Pradaxa  - pAF is low burden as of now, can add flecainide in the future if increasing Afib    FV annual    Darrian Garcia MD  Cardiac Electrophysiology

## 2021-12-31 LAB — EKG IMPRESSION: NORMAL

## 2021-12-31 PROCEDURE — 93000 ELECTROCARDIOGRAM COMPLETE: CPT | Performed by: INTERNAL MEDICINE

## 2022-01-10 DIAGNOSIS — E78.5 HYPERLIPIDEMIA, UNSPECIFIED HYPERLIPIDEMIA TYPE: ICD-10-CM

## 2022-01-12 RX ORDER — ATORVASTATIN CALCIUM 40 MG/1
TABLET, FILM COATED ORAL
Qty: 100 TABLET | Refills: 3 | Status: SHIPPED | OUTPATIENT
Start: 2022-01-12 | End: 2022-11-07 | Stop reason: SDUPTHER

## 2022-01-13 ENCOUNTER — HOSPITAL ENCOUNTER (OUTPATIENT)
Dept: RADIOLOGY | Facility: MEDICAL CENTER | Age: 72
End: 2022-01-13
Attending: INTERNAL MEDICINE
Payer: MEDICARE

## 2022-01-13 DIAGNOSIS — Z12.31 VISIT FOR SCREENING MAMMOGRAM: ICD-10-CM

## 2022-01-13 PROCEDURE — 77063 BREAST TOMOSYNTHESIS BI: CPT

## 2022-01-31 DIAGNOSIS — I10 ESSENTIAL HYPERTENSION: ICD-10-CM

## 2022-02-03 ENCOUNTER — TELEPHONE (OUTPATIENT)
Dept: CARDIOLOGY | Facility: MEDICAL CENTER | Age: 72
End: 2022-02-03

## 2022-02-03 NOTE — TELEPHONE ENCOUNTER
MC    Patient called for a refill of metoprolol tartrate (LOPRESSOR) 25 MG Tab. She is completely out.    Thank you

## 2022-03-11 ENCOUNTER — HOSPITAL ENCOUNTER (OUTPATIENT)
Dept: LAB | Facility: MEDICAL CENTER | Age: 72
End: 2022-03-11
Attending: INTERNAL MEDICINE
Payer: MEDICARE

## 2022-03-11 DIAGNOSIS — E11.9 TYPE 2 DIABETES MELLITUS WITHOUT COMPLICATION, WITHOUT LONG-TERM CURRENT USE OF INSULIN (HCC): ICD-10-CM

## 2022-03-11 DIAGNOSIS — E78.2 MIXED HYPERLIPIDEMIA: ICD-10-CM

## 2022-03-11 LAB
25(OH)D3 SERPL-MCNC: 62 NG/ML (ref 30–100)
ALBUMIN SERPL BCP-MCNC: 4 G/DL (ref 3.2–4.9)
ALBUMIN/GLOB SERPL: 1.9 G/DL
ALP SERPL-CCNC: 97 U/L (ref 30–99)
ALT SERPL-CCNC: 14 U/L (ref 2–50)
ANION GAP SERPL CALC-SCNC: 10 MMOL/L (ref 7–16)
AST SERPL-CCNC: 16 U/L (ref 12–45)
BASOPHILS # BLD AUTO: 1.1 % (ref 0–1.8)
BASOPHILS # BLD: 0.05 K/UL (ref 0–0.12)
BILIRUB SERPL-MCNC: 0.9 MG/DL (ref 0.1–1.5)
BUN SERPL-MCNC: 15 MG/DL (ref 8–22)
CALCIUM SERPL-MCNC: 9.1 MG/DL (ref 8.5–10.5)
CHLORIDE SERPL-SCNC: 104 MMOL/L (ref 96–112)
CHOLEST SERPL-MCNC: 142 MG/DL (ref 100–199)
CO2 SERPL-SCNC: 26 MMOL/L (ref 20–33)
CREAT SERPL-MCNC: 0.76 MG/DL (ref 0.5–1.4)
EOSINOPHIL # BLD AUTO: 0.11 K/UL (ref 0–0.51)
EOSINOPHIL NFR BLD: 2.4 % (ref 0–6.9)
ERYTHROCYTE [DISTWIDTH] IN BLOOD BY AUTOMATED COUNT: 48.1 FL (ref 35.9–50)
EST. AVERAGE GLUCOSE BLD GHB EST-MCNC: 140 MG/DL
FASTING STATUS PATIENT QL REPORTED: NORMAL
GLOBULIN SER CALC-MCNC: 2.1 G/DL (ref 1.9–3.5)
GLUCOSE SERPL-MCNC: 116 MG/DL (ref 65–99)
HBA1C MFR BLD: 6.5 % (ref 4–5.6)
HCT VFR BLD AUTO: 42.9 % (ref 37–47)
HDLC SERPL-MCNC: 64 MG/DL
HGB BLD-MCNC: 13.8 G/DL (ref 12–16)
IMM GRANULOCYTES # BLD AUTO: 0.02 K/UL (ref 0–0.11)
IMM GRANULOCYTES NFR BLD AUTO: 0.4 % (ref 0–0.9)
LDLC SERPL CALC-MCNC: 60 MG/DL
LYMPHOCYTES # BLD AUTO: 0.85 K/UL (ref 1–4.8)
LYMPHOCYTES NFR BLD: 18.4 % (ref 22–41)
MCH RBC QN AUTO: 30 PG (ref 27–33)
MCHC RBC AUTO-ENTMCNC: 32.2 G/DL (ref 33.6–35)
MCV RBC AUTO: 93.3 FL (ref 81.4–97.8)
MONOCYTES # BLD AUTO: 0.47 K/UL (ref 0–0.85)
MONOCYTES NFR BLD AUTO: 10.2 % (ref 0–13.4)
NEUTROPHILS # BLD AUTO: 3.12 K/UL (ref 2–7.15)
NEUTROPHILS NFR BLD: 67.5 % (ref 44–72)
NRBC # BLD AUTO: 0 K/UL
NRBC BLD-RTO: 0 /100 WBC
PLATELET # BLD AUTO: 284 K/UL (ref 164–446)
PMV BLD AUTO: 9.7 FL (ref 9–12.9)
POTASSIUM SERPL-SCNC: 5.4 MMOL/L (ref 3.6–5.5)
PROT SERPL-MCNC: 6.1 G/DL (ref 6–8.2)
RBC # BLD AUTO: 4.6 M/UL (ref 4.2–5.4)
SODIUM SERPL-SCNC: 140 MMOL/L (ref 135–145)
TRIGL SERPL-MCNC: 90 MG/DL (ref 0–149)
WBC # BLD AUTO: 4.6 K/UL (ref 4.8–10.8)

## 2022-03-11 PROCEDURE — 82306 VITAMIN D 25 HYDROXY: CPT

## 2022-03-11 PROCEDURE — 82043 UR ALBUMIN QUANTITATIVE: CPT

## 2022-03-11 PROCEDURE — 83036 HEMOGLOBIN GLYCOSYLATED A1C: CPT

## 2022-03-11 PROCEDURE — 80053 COMPREHEN METABOLIC PANEL: CPT

## 2022-03-11 PROCEDURE — 36415 COLL VENOUS BLD VENIPUNCTURE: CPT

## 2022-03-11 PROCEDURE — 80061 LIPID PANEL: CPT

## 2022-03-11 PROCEDURE — 82570 ASSAY OF URINE CREATININE: CPT

## 2022-03-11 PROCEDURE — 85025 COMPLETE CBC W/AUTO DIFF WBC: CPT

## 2022-03-12 LAB
CREAT UR-MCNC: 82.54 MG/DL
MICROALBUMIN UR-MCNC: <1.2 MG/DL
MICROALBUMIN/CREAT UR: NORMAL MG/G (ref 0–30)

## 2022-03-21 ENCOUNTER — OFFICE VISIT (OUTPATIENT)
Dept: MEDICAL GROUP | Facility: PHYSICIAN GROUP | Age: 72
End: 2022-03-21
Payer: MEDICARE

## 2022-03-21 VITALS
HEART RATE: 75 BPM | WEIGHT: 177.4 LBS | HEIGHT: 63 IN | OXYGEN SATURATION: 97 % | TEMPERATURE: 96.8 F | DIASTOLIC BLOOD PRESSURE: 62 MMHG | BODY MASS INDEX: 31.43 KG/M2 | SYSTOLIC BLOOD PRESSURE: 102 MMHG | RESPIRATION RATE: 12 BRPM

## 2022-03-21 DIAGNOSIS — D68.69 HYPERCOAGULABLE STATE DUE TO PAROXYSMAL ATRIAL FIBRILLATION (HCC): ICD-10-CM

## 2022-03-21 DIAGNOSIS — E11.9 TYPE 2 DIABETES MELLITUS WITHOUT COMPLICATION, WITHOUT LONG-TERM CURRENT USE OF INSULIN (HCC): ICD-10-CM

## 2022-03-21 DIAGNOSIS — I10 ESSENTIAL (PRIMARY) HYPERTENSION: ICD-10-CM

## 2022-03-21 DIAGNOSIS — Z78.0 MENOPAUSE: ICD-10-CM

## 2022-03-21 DIAGNOSIS — I48.0 HYPERCOAGULABLE STATE DUE TO PAROXYSMAL ATRIAL FIBRILLATION (HCC): ICD-10-CM

## 2022-03-21 DIAGNOSIS — I48.0 PAROXYSMAL ATRIAL FIBRILLATION (HCC): ICD-10-CM

## 2022-03-21 PROBLEM — R74.8 ELEVATED ALKALINE PHOSPHATASE LEVEL: Status: RESOLVED | Noted: 2021-09-20 | Resolved: 2022-03-21

## 2022-03-21 PROCEDURE — 99214 OFFICE O/P EST MOD 30 MIN: CPT | Performed by: INTERNAL MEDICINE

## 2022-03-21 ASSESSMENT — FIBROSIS 4 INDEX: FIB4 SCORE: 1.1

## 2022-03-21 ASSESSMENT — PATIENT HEALTH QUESTIONNAIRE - PHQ9: CLINICAL INTERPRETATION OF PHQ2 SCORE: 0

## 2022-03-21 NOTE — ASSESSMENT & PLAN NOTE
Chronic and stable problem, A1c improved on follow-up.  Fasting blood sugars reviewed and ranged between  with most readings in the 110-120 range.  Continue observing off pharmacotherapy at this time.  We will have her return to see me in 3 months to recheck A1c.  Will request most recent retinal examination, foot exam performed in clinic today with evidence of mild neuropathy which is likely nondiabetic related.

## 2022-03-21 NOTE — LETTER
[facility]  Temitope Bess D.O.  [address]  [place] [address]  Fax: [phone]   Authorization for Release/Disclosure of   Protected Health Information   Name: ALVARO ARTEAGA : 1950 SSN: [id number]   Address: [facility] [place] [address] Phone:    [phone] (home) [phone] (work)   I authorize the entity listed below to release/disclose the PHI below to:   [facility]Temitope Bess D.O. and Temitope Bess D.O.   Provider or Entity Name:  Dr. Viramontes- optometry [facility]   Address   City, [facility]   Phone:      Fax:     Reason for request: continuity of care   Information to be released:    [  ] LAST COLONOSCOPY,  including any PATH REPORT and follow-up  [  ] LAST FIT/COLOGUARD RESULT [  ] LAST DEXA  [  ] LAST MAMMOGRAM  [  ] LAST PAP  [  ] LAST LABS [ x ] RETINA EXAM REPORT  [  ] IMMUNIZATION RECORDS  [ x ] Release all info      [ x ] Check here and initial the line next to each item to release ALL health information INCLUDING  _____ Care and treatment for drug and / or alcohol abuse  _____ HIV testing, infection status, or AIDS  _____ Genetic Testing    DATES OF SERVICE OR TIME PERIOD TO BE DISCLOSED: _-____________  I understand and acknowledge that:  * This Authorization may be revoked at any time by you in writing, except if your health information has already been used or disclosed.  * Your health information that will be used or disclosed as a result of you signing this authorization could be re-disclosed by the recipient. If this occurs, your re-disclosed health information may no longer be protected by State or Federal laws.  * You may refuse to sign this Authorization. Your refusal will not affect your ability to obtain treatment.  * This Authorization becomes effective upon signing and will  on (date) __________.      If no date is indicated, this Authorization will  one (1) year from the signature date.    Name: Alvaro Reyna  Loges    Signature:   Date:     3/21/2022       PLEASE FAX REQUESTED RECORDS BACK TO: [phone]

## 2022-03-21 NOTE — ASSESSMENT & PLAN NOTE
Chronic and ongoing problem, continue rate control approach with metoprolol titrate and systemic anticoagulation Pradaxa.  Continue follow-up with cardiology as recommended.  Occasional episodes of palpitations when she is stressed which resolved with metoprolol.

## 2022-03-21 NOTE — ASSESSMENT & PLAN NOTE
Chronic and stable problem, no bruising or bleeding complications.  Continue Pradaxa 150 mg twice daily.  She follows with cardiology annually.

## 2022-03-21 NOTE — PROGRESS NOTES
Subjective:   Chief Complaint/History of Present Illness:  Cristela Alan is a 71 y.o. female established patient who presents today to discuss medical problems as listed below. Cristela is accompanied by her  in the waiting room.    Problem   Type 2 Diabetes Mellitus Without Complication, Without Long-Term Current Use of Insulin (Hcc)       Ref. Range 10/1/2021 08:39 3/11/2022 08:39   Glycohemoglobin Latest Ref Range: 4.0 - 5.6 % 6.7 (H) 6.5 (H)   Estim. Avg Glu Latest Units: mg/dL 146 140       She technically met criteria for type 2 diabetes as of May 2019.  Does not appear this diagnosis was ever given to the patient.  She has never taken any glucose lowering medications.  She is on statin therapy for history of elevated cholesterol and TIA and she is also on ACE inhibitor for history of hypertension.  No known history of retinopathy or nephropathy. She has mild chronic neuropathy likely from a nondiabetic source.     Atrial Fibrillation (Hcc)    She reports waking up one morning and experiencing palpitations.  Upon further evaluation she was found to be in atrial fibrillation.  This was in December 2012.  She was treated with cardioversion and since that time has maintained normal sinus rhythm.  She notes very occasionally she will feel palpitations, and thus it is labeled as paroxysmal atrial fibrillation.  She has a rate control approach with metoprolol tartrate 25 mg twice daily.  She is also on Pradaxa for systemic anticoagulation and stroke risk management.  She follows with cardiology, Dr. Avery, annually, and more recently Dr. Garcia with EP twice annually.    Regimen: metoprolol tartrate 25 mg twice daily and Pradaxa 150 mg daily       Hypercoagulable State Due to Paroxysmal Atrial Fibrillation (Hcc)    She has been using Pradaxa since 2012 due to paroxysmal atrial fibrillation.  No complications of bruising or bleeding at this time.     Essential (Primary) Hypertension    She has a longstanding  history of hypertension.  She is on dual therapy.  She has diagnosis of paroxysmal atrial fibrillation and diastolic dysfunction.  Most recent echocardiogram was stable and EKG has maintained normal sinus rhythm.  No chest pain or pressure, no dyspnea on exertion, no presyncope or syncope.  Electrolytes and kidney function are stable.  No signs or symptoms of orthostasis.    Blood pressure in clinic ranges 102-128/60-64    Current regimen: Lisinopril 5 mg daily and metoprolol tartrate 25 mg twice daily       Current Medications:  Current Outpatient Medications Ordered in Epic   Medication Sig Dispense Refill   • metoprolol tartrate (LOPRESSOR) 25 MG Tab TAKE ONE TABLET BY MOUTH TWICE DAILY 200 Tablet 2   • atorvastatin (LIPITOR) 40 MG Tab TAKE ONE TABLET BY MOUTH ONE TIME DAILY IN THE P.M. 100 Tablet 3   • glucose blood strip 1 Strip by Other route as needed. 100 Strip 3   • Lancets Lancets order: Lancets for Abbott Freestyle Lite meter. Sig: use once daily and prn ssx high or low sugar. #100 RF x 3 100 Each 3   • dabigatran (PRADAXA) 150 MG Cap capsule TAKE ONE CAPSULE BY MOUTH TWICE DAILY 180 Capsule 3   • lisinopril (PRINIVIL) 5 MG Tab Take 1 tablet by mouth every day. 100 tablet 3   • Multiple Vitamins-Minerals (PRESERVISION AREDS PO) Take  by mouth.     • Cholecalciferol (VITAMIN D HIGH POTENCY PO) Take 2,000 mg by mouth every day.     • Polyethylene Glycol 400 (BLINK TEARS OP) by Ophthalmic route every day.     • Calcium 600 MG TABS Take 1 Tab by mouth every morning. Taking 1000mg  Indications: per pt is taking 500 mg     • multivitamin (THERAGRAN) TABS Take 1 Tablet by mouth every morning.       • Polyethyl Glycol-Propyl Glycol (SYSTANE OP) Place 1 Drop in both eyes every bedtime. Indications: **OTC**       No current Epic-ordered facility-administered medications on file.          Objective:   Physical Exam:    Vitals: /62 (BP Location: Left arm, Patient Position: Sitting, BP Cuff Size: Adult)   Pulse  "75   Temp 36 °C (96.8 °F) (Temporal)   Resp 12   Ht 1.6 m (5' 3\")   Wt 80.5 kg (177 lb 6.4 oz)   SpO2 97%    BMI: Body mass index is 31.42 kg/m².  Physical Exam  Constitutional:       General: She is not in acute distress.     Appearance: Normal appearance. She is obese. She is not ill-appearing.   HENT:      Right Ear: Tympanic membrane and ear canal normal. There is no impacted cerumen.      Left Ear: Tympanic membrane and ear canal normal. There is no impacted cerumen.   Eyes:      General: No scleral icterus.     Conjunctiva/sclera: Conjunctivae normal.   Cardiovascular:      Rate and Rhythm: Normal rate and regular rhythm.      Pulses: Normal pulses.   Pulmonary:      Effort: Pulmonary effort is normal. No respiratory distress.      Breath sounds: Normal breath sounds. No wheezing or rhonchi.   Abdominal:      General: Bowel sounds are normal.      Palpations: Abdomen is soft.      Tenderness: There is no abdominal tenderness.   Musculoskeletal:      Right lower leg: No edema.      Left lower leg: No edema.      Comments: Diabetic Foot Exam: No ulcers/laceration/blisters present on bilateral feet, normal gross anatomy of bilateral feet without abnormal curvature or arch, no toe deformities, + toenail thickness of right > left 2nd toenails, no ingrown toenails, no increase in skin temperature bilaterally, no skin erythema to bilateral feet, bilateral dorsalis pedis pulses 2+ and equal, Refill less than 2 seconds bilaterally, [place] 10 g monofilament testing mildly decreased in bilateral great toes, bilateral balls of feet at medial/lateral/mid ball of foot   Skin:     General: Skin is warm and dry.      Findings: No bruising or rash.   Psychiatric:         Mood and Affect: Mood normal.         Behavior: Behavior normal.         Thought Content: Thought content normal.         Judgment: Judgment normal.          Assessment and Plan:   Cristela is a 71 y.o. female with the following:  Problem List Items " Addressed This Visit     Atrial fibrillation (HCC)     Chronic and ongoing problem, continue rate control approach with metoprolol titrate and systemic anticoagulation Pradaxa.  Continue follow-up with cardiology as recommended.  Occasional episodes of palpitations when she is stressed which resolved with metoprolol.         Hypercoagulable state due to paroxysmal atrial fibrillation (HCC)     Chronic and stable problem, no bruising or bleeding complications.  Continue Pradaxa 150 mg twice daily.  She follows with cardiology annually.         Essential (primary) hypertension     Chronic and stable problem, blood pressure at goal, appropriate continue on lisinopril and metoprolol tartrate at current doses.         Type 2 diabetes mellitus without complication, without long-term current use of insulin (HCC)     Chronic and stable problem, A1c improved on follow-up.  Fasting blood sugars reviewed and ranged between  with most readings in the 110-120 range.  Continue observing off pharmacotherapy at this time.  We will have her return to see me in 3 months to recheck A1c.  Will request most recent retinal examination, foot exam performed in clinic today with evidence of mild neuropathy which is likely nondiabetic related.         Relevant Orders    Diabetic Monofilament Lower Extremity Exam (Completed)      Other Visit Diagnoses     Menopause        Relevant Orders    DS-BONE DENSITY STUDY (DEXA)             Annual Health Assessment Questions:    1.  Are you currently engaging in any exercise or physical activity? Yes  Gardening   2.  How would you describe your mood or emotional well-being today? good    3.  Have you had any falls in the last year? Yes    4.  Have you noticed any problems with your balance or had difficulty walking? No    5.  In the last six months have you experienced any leakage of urine? No    6. DPA/Advanced Directive: Patient has Advanced Directive, but it is not on file. Instructed to bring  in a copy to scan into their chart.       RTC: Return in about 6 months (around 9/21/2022) for 3 months with MA for A1c, will need annual lab orders placed at that time for our follow up 10/22.    I spent a total of 32 minutes with record review, exam, communication with the patient, communication with other providers, and documentation of this encounter.    PLEASE NOTE: This dictation was created using voice recognition software. I have made every reasonable attempt to correct obvious errors, but I expect that there are errors of grammar and possibly content that I did not discover before finalizing the note.      Temitope Bess, DO  Geriatric and Internal Medicine  [facility]

## 2022-03-21 NOTE — ASSESSMENT & PLAN NOTE
Chronic and stable problem, blood pressure at goal, appropriate continue on lisinopril and metoprolol tartrate at current doses.

## 2022-06-13 ENCOUNTER — NON-PROVIDER VISIT (OUTPATIENT)
Dept: MEDICAL GROUP | Facility: PHYSICIAN GROUP | Age: 72
End: 2022-06-13
Payer: MEDICARE

## 2022-06-13 DIAGNOSIS — E55.9 VITAMIN D DEFICIENCY: ICD-10-CM

## 2022-06-13 DIAGNOSIS — E11.9 TYPE 2 DIABETES MELLITUS WITHOUT COMPLICATION, WITHOUT LONG-TERM CURRENT USE OF INSULIN (HCC): ICD-10-CM

## 2022-06-13 DIAGNOSIS — E78.2 MIXED HYPERLIPIDEMIA: ICD-10-CM

## 2022-06-13 LAB
HBA1C MFR BLD: 6.4 % (ref 0–5.6)
INT CON NEG: ABNORMAL
INT CON POS: ABNORMAL

## 2022-06-13 PROCEDURE — 83036 HEMOGLOBIN GLYCOSYLATED A1C: CPT | Performed by: INTERNAL MEDICINE

## 2022-06-13 NOTE — NON-PROVIDER
ALVARO Alan is a 71 y.o. female here for a non-provider visit for A1C     If abnormal was an in office provider notified today (if so, indicate provider)? Yes    Routed to PCP? Yes

## 2022-07-14 ENCOUNTER — TELEPHONE (OUTPATIENT)
Dept: HEALTH INFORMATION MANAGEMENT | Facility: OTHER | Age: 72
End: 2022-07-14

## 2022-08-16 ENCOUNTER — TELEPHONE (OUTPATIENT)
Dept: CARDIOLOGY | Facility: MEDICAL CENTER | Age: 72
End: 2022-08-16
Payer: MEDICARE

## 2022-08-16 DIAGNOSIS — I10 ESSENTIAL HYPERTENSION: ICD-10-CM

## 2022-08-16 NOTE — TELEPHONE ENCOUNTER
Caller: Cristela    Medication Name and Dosage: metoprolol tartrate (LOPRESSOR) 25 MG Tab    Did patient contact pharmacy (If no, please call pharmacy first): Yes, and they're closing.    Medication amount left: 5 days    [facility] [address]    Other questions (Topic): All [facility] pharmacy's closed today    Callback Number (Will only call for issues): [phone]

## 2022-08-23 DIAGNOSIS — I10 ESSENTIAL HYPERTENSION: ICD-10-CM

## 2022-08-23 RX ORDER — LISINOPRIL 5 MG/1
5 TABLET ORAL DAILY
Qty: 100 TABLET | Refills: 0 | Status: SHIPPED | OUTPATIENT
Start: 2022-08-23 | End: 2022-11-28

## 2022-09-09 ENCOUNTER — HOSPITAL ENCOUNTER (OUTPATIENT)
Dept: LAB | Facility: MEDICAL CENTER | Age: 72
End: 2022-09-09
Attending: INTERNAL MEDICINE
Payer: MEDICARE

## 2022-09-09 DIAGNOSIS — E55.9 VITAMIN D DEFICIENCY: ICD-10-CM

## 2022-09-09 DIAGNOSIS — E78.2 MIXED HYPERLIPIDEMIA: ICD-10-CM

## 2022-09-09 DIAGNOSIS — E11.9 TYPE 2 DIABETES MELLITUS WITHOUT COMPLICATION, WITHOUT LONG-TERM CURRENT USE OF INSULIN (HCC): ICD-10-CM

## 2022-09-09 LAB
25(OH)D3 SERPL-MCNC: 61 NG/ML (ref 30–100)
ALBUMIN SERPL BCP-MCNC: 4.2 G/DL (ref 3.2–4.9)
ALBUMIN/GLOB SERPL: 1.8 G/DL
ALP SERPL-CCNC: 87 U/L (ref 30–99)
ALT SERPL-CCNC: 13 U/L (ref 2–50)
ANION GAP SERPL CALC-SCNC: 8 MMOL/L (ref 7–16)
AST SERPL-CCNC: 17 U/L (ref 12–45)
BASOPHILS # BLD AUTO: 0.8 % (ref 0–1.8)
BASOPHILS # BLD: 0.04 K/UL (ref 0–0.12)
BILIRUB SERPL-MCNC: 1.1 MG/DL (ref 0.1–1.5)
BUN SERPL-MCNC: 16 MG/DL (ref 8–22)
CALCIUM SERPL-MCNC: 9.2 MG/DL (ref 8.5–10.5)
CHLORIDE SERPL-SCNC: 105 MMOL/L (ref 96–112)
CHOLEST SERPL-MCNC: 135 MG/DL (ref 100–199)
CO2 SERPL-SCNC: 27 MMOL/L (ref 20–33)
CREAT SERPL-MCNC: 0.81 MG/DL (ref 0.5–1.4)
CREAT UR-MCNC: 51.27 MG/DL
EOSINOPHIL # BLD AUTO: 0.14 K/UL (ref 0–0.51)
EOSINOPHIL NFR BLD: 2.8 % (ref 0–6.9)
ERYTHROCYTE [DISTWIDTH] IN BLOOD BY AUTOMATED COUNT: 46.8 FL (ref 35.9–50)
EST. AVERAGE GLUCOSE BLD GHB EST-MCNC: 134 MG/DL
FASTING STATUS PATIENT QL REPORTED: NORMAL
GFR SERPLBLD CREATININE-BSD FMLA CKD-EPI: 76 ML/MIN/1.73 M 2
GLOBULIN SER CALC-MCNC: 2.4 G/DL (ref 1.9–3.5)
GLUCOSE SERPL-MCNC: 120 MG/DL (ref 65–99)
HBA1C MFR BLD: 6.3 % (ref 4–5.6)
HCT VFR BLD AUTO: 42.7 % (ref 37–47)
HDLC SERPL-MCNC: 59 MG/DL
HGB BLD-MCNC: 14.2 G/DL (ref 12–16)
IMM GRANULOCYTES # BLD AUTO: 0.03 K/UL (ref 0–0.11)
IMM GRANULOCYTES NFR BLD AUTO: 0.6 % (ref 0–0.9)
LDLC SERPL CALC-MCNC: 59 MG/DL
LYMPHOCYTES # BLD AUTO: 1.11 K/UL (ref 1–4.8)
LYMPHOCYTES NFR BLD: 22 % (ref 22–41)
MCH RBC QN AUTO: 30.5 PG (ref 27–33)
MCHC RBC AUTO-ENTMCNC: 33.3 G/DL (ref 33.6–35)
MCV RBC AUTO: 91.6 FL (ref 81.4–97.8)
MICROALBUMIN UR-MCNC: <1.2 MG/DL
MICROALBUMIN/CREAT UR: NORMAL MG/G (ref 0–30)
MONOCYTES # BLD AUTO: 0.61 K/UL (ref 0–0.85)
MONOCYTES NFR BLD AUTO: 12.1 % (ref 0–13.4)
NEUTROPHILS # BLD AUTO: 3.11 K/UL (ref 2–7.15)
NEUTROPHILS NFR BLD: 61.7 % (ref 44–72)
NRBC # BLD AUTO: 0 K/UL
NRBC BLD-RTO: 0 /100 WBC
PLATELET # BLD AUTO: 282 K/UL (ref 164–446)
PMV BLD AUTO: 9.6 FL (ref 9–12.9)
POTASSIUM SERPL-SCNC: 4.5 MMOL/L (ref 3.6–5.5)
PROT SERPL-MCNC: 6.6 G/DL (ref 6–8.2)
RBC # BLD AUTO: 4.66 M/UL (ref 4.2–5.4)
SODIUM SERPL-SCNC: 140 MMOL/L (ref 135–145)
TRIGL SERPL-MCNC: 84 MG/DL (ref 0–149)
TSH SERPL DL<=0.005 MIU/L-ACNC: 1.63 UIU/ML (ref 0.38–5.33)
VIT B12 SERPL-MCNC: 586 PG/ML (ref 211–911)
WBC # BLD AUTO: 5 K/UL (ref 4.8–10.8)

## 2022-09-09 PROCEDURE — 82306 VITAMIN D 25 HYDROXY: CPT

## 2022-09-09 PROCEDURE — 84443 ASSAY THYROID STIM HORMONE: CPT

## 2022-09-09 PROCEDURE — 83036 HEMOGLOBIN GLYCOSYLATED A1C: CPT

## 2022-09-09 PROCEDURE — 82043 UR ALBUMIN QUANTITATIVE: CPT

## 2022-09-09 PROCEDURE — 82570 ASSAY OF URINE CREATININE: CPT

## 2022-09-09 PROCEDURE — 36415 COLL VENOUS BLD VENIPUNCTURE: CPT

## 2022-09-09 PROCEDURE — 80053 COMPREHEN METABOLIC PANEL: CPT

## 2022-09-09 PROCEDURE — 85025 COMPLETE CBC W/AUTO DIFF WBC: CPT

## 2022-09-09 PROCEDURE — 82607 VITAMIN B-12: CPT

## 2022-09-09 PROCEDURE — 80061 LIPID PANEL: CPT

## 2022-09-15 ENCOUNTER — DOCUMENTATION (OUTPATIENT)
Dept: HEALTH INFORMATION MANAGEMENT | Facility: OTHER | Age: 72
End: 2022-09-15
Payer: MEDICARE

## 2022-10-24 ENCOUNTER — PATIENT MESSAGE (OUTPATIENT)
Dept: CARDIOLOGY | Facility: MEDICAL CENTER | Age: 72
End: 2022-10-24
Payer: MEDICARE

## 2022-10-25 DIAGNOSIS — I48.91 ATRIAL FIBRILLATION, UNSPECIFIED TYPE (HCC): ICD-10-CM

## 2022-10-25 RX ORDER — DABIGATRAN ETEXILATE 150 MG/1
CAPSULE ORAL
Qty: 180 CAPSULE | Refills: 0 | Status: SHIPPED | OUTPATIENT
Start: 2022-10-25 | End: 2022-10-26 | Stop reason: SDUPTHER

## 2022-10-26 DIAGNOSIS — I48.91 ATRIAL FIBRILLATION, UNSPECIFIED TYPE (HCC): ICD-10-CM

## 2022-10-26 RX ORDER — DABIGATRAN ETEXILATE 150 MG/1
CAPSULE ORAL
Qty: 180 CAPSULE | Refills: 0 | Status: SHIPPED
Start: 2022-10-26 | End: 2022-10-27 | Stop reason: SDUPTHER

## 2022-10-26 RX ORDER — DABIGATRAN ETEXILATE 150 MG/1
CAPSULE ORAL
Qty: 180 CAPSULE | Refills: 0 | Status: SHIPPED
Start: 2022-10-26 | End: 2022-10-26 | Stop reason: SDUPTHER

## 2022-10-27 ENCOUNTER — TELEPHONE (OUTPATIENT)
Dept: CARDIOLOGY | Facility: MEDICAL CENTER | Age: 72
End: 2022-10-27

## 2022-10-27 ENCOUNTER — OFFICE VISIT (OUTPATIENT)
Dept: CARDIOLOGY | Facility: MEDICAL CENTER | Age: 72
End: 2022-10-27
Payer: MEDICARE

## 2022-10-27 VITALS
DIASTOLIC BLOOD PRESSURE: 68 MMHG | HEIGHT: 63 IN | RESPIRATION RATE: 14 BRPM | OXYGEN SATURATION: 96 % | WEIGHT: 173 LBS | HEART RATE: 81 BPM | SYSTOLIC BLOOD PRESSURE: 120 MMHG | BODY MASS INDEX: 30.65 KG/M2

## 2022-10-27 DIAGNOSIS — I48.91 ATRIAL FIBRILLATION, UNSPECIFIED TYPE (HCC): ICD-10-CM

## 2022-10-27 PROCEDURE — 93000 ELECTROCARDIOGRAM COMPLETE: CPT | Performed by: INTERNAL MEDICINE

## 2022-10-27 PROCEDURE — 99214 OFFICE O/P EST MOD 30 MIN: CPT | Performed by: INTERNAL MEDICINE

## 2022-10-27 RX ORDER — DABIGATRAN ETEXILATE 150 MG/1
CAPSULE ORAL
Qty: 180 CAPSULE | Refills: 0 | Status: SHIPPED
Start: 2022-10-27 | End: 2023-02-17 | Stop reason: SDUPTHER

## 2022-10-27 ASSESSMENT — FIBROSIS 4 INDEX: FIB4 SCORE: 1.24

## 2022-10-27 NOTE — PROGRESS NOTES
"Arrhythmia Clinic Note (Established patient)    DOS: 10/27/2022    Chief complaint/Reason for consult: pAF    Interval History: 73 y/o F with pAF. Not many episodes, takes extra metoprolol once in a while. On Pradaxa.    ROS (+ highlighted in bold):  Constitutional: Fevers/chills/fatigue/weightloss  HEENT: Blurry vision/eye pain/sore throat/hearing loss  Respiratory: Shortness of breath/cough  Cardiovascular: Chest pain/palpitations/edema/orthopnea/syncope  GI: Nausea/vomitting/diarrhea  MSK: Arthralgias/myagias/muscle weakness  Skin: Rash/sores  Neurological: Numbness/tremors/vertigo  Endocrine: Excessive thirst/polyuria/cold intolerance/heat intolerance  Psych: Depression/anxiety    Past Medical History:   Diagnosis Date    Allergic rhinitis     Anesthesia     hypotension    Atrial fibrillation (HCC)     Cancer (HCC) 2014    endometrial    CATARACT     bilateral lens implants    Cholesterol blood decreased     Dental disorder     dentures    Dermatitis, contact     Dyslipidemia 6/30/2016    Elevated alkaline phosphatase level 9/20/2021      Ref. Range 3/18/2021 08:22 9/3/2021 08:23  Alkaline Phosphatase Latest Ref Range: 30 - 99 U/L 85 104 (H)   New finding of mildly elevated alkaline phosphatase.  She has history of endometrial cancer.  She has osteopenia with elevated FRAX.  No known primary liver disease.    Encounter for general adult medical examination without abnormal findings 12/2/2014    Melissa for GI No flu shots- egg allergies  Declines all vaccines- had reaction to tdap    Endometrium cancer (HCC)     Hyperlipidemia     Hypertension     Impaired glucose tolerance     Macular degeneration     Obesity     Pneumonia     had PNU 35 years ago    Prolapsed uterus 8/2014    current issue    Pulmonary nodule 11/9/2016    No history of tobacco use CT 9/16, 12/16- no change-6/17- ? New nodule>>PET CT-10/17 neg     Stroke (HCC)     \"mini strokes\" TIA, no residual    TIA (transient ischemic attack)     Unspecified " "hemorrhagic conditions     NOSE BLEEDS/on Plavix    Vitamin D deficiency        Past Surgical History:   Procedure Laterality Date    HYSTERECTOMY ROBOTIC XI  8/28/2014    Performed by Pantera Wylie M.D. at SURGERY [facility] ORS    LYMPH NODE DISSECTION ROBOTIC XI  8/28/2014    Performed by Pantera Wylie M.D. at SURGERY [facility] ORS    CATARACT PHACO WITH IOL  8/19/2010    Performed by DAVE AGUILAR at SURGERY SAME DAY [facility] ORS    CATARACT PHACO WITH IOL  8/5/2010    Performed by DAVE AGUILAR at SURGERY SAME DAY [facility] ORS    HIP ARTHROSCOPY  5/12/08    Performed by ZINA HOLLIS at SURGERY [facility] ORS    ACETABULAR OSTEOTOMY  5/12/08    Performed by ZINA HOLLIS at SURGERY [facility] ORS    ORTHOPEDIC OSTEOTOMY  5/12/08    Performed by ZINA HOLLIS at SURGERY [facility] ORS    NASAL POLYPECTOMY  1994    \"removal of papilloma\"    ABDOMINAL HYSTERECTOMY TOTAL      EYE SURGERY         Social History     Socioeconomic History    Marital status:      Spouse name: Not on file    Number of children: Not on file    Years of education: Not on file    Highest education level: Not on file   Occupational History    Not on file   Tobacco Use    Smoking status: Never    Smokeless tobacco: Never    Tobacco comments:     continued abstinence   Vaping Use    Vaping Use: Never used   Substance and Sexual Activity    Alcohol use: No    Drug use: No    Sexual activity: Not Currently   Other Topics Concern    Not on file   Social History Narrative    She is  to Art for 49 years, they met at a [employer]. She has a daughter Nikki (43, [place]) and she has 2 kids. She retired in her late 60's. 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     Social Determinants of Health     Financial Resource Strain: Not on file   Food Insecurity: Not on file   Transportation Needs: Not on file   Physical Activity: Not on file   Stress: Not on file   Social Connections: Not on file   Intimate " "Partner Violence: Not on file   Housing Stability: Not on file       Family History   Problem Relation Age of Onset    Hyperlipidemia Mother     Hypertension Mother     Cancer Mother     No Known Problems Father     Heart Disease Sister     Alcohol/Drug Sister     Hypertension Sister     Heart Attack Sister     Cancer Sister 66        ? breast cancer as well    Hypertension Sister     Hyperlipidemia Sister     Cancer Other     Hypertension Other     Diabetes Maternal Grandmother         elderly    Other Sister         suicide     Stroke Neg Hx        Allergies   Allergen Reactions    Fentanyl Anaphylaxis    Flu Virus Vaccine     Versed Shortness of Breath and Vomiting    Bee      And  spider--- tongue swelling    Codeine      Increased heart rate    Eggs Vomiting    Lidocaine Hcl      \"severe drop in BP\"    Other Drug      Novocaine hypotension ALL \"MELISA\"    Penicillins      Increased heart rate    Soap Rash     Laundry Tide soap, Dove and Ivory soap    Tape Rash     Paper tape OK    Xylocaine [Lidocaine Hcl, Local Anesth.]      All \"MELISA\" hypotension       Current Outpatient Medications   Medication Sig Dispense Refill    dabigatran (PRADAXA) 150 MG Cap capsule TAKE ONE CAPSULE BY MOUTH TWICE DAILY 180 Capsule 0    lisinopril (PRINIVIL) 5 MG Tab Take 1 Tablet by mouth every day. 100 Tablet 0    metoprolol tartrate (LOPRESSOR) 25 MG Tab Take 1 Tablet by mouth 2 times a day. 200 Tablet 0    atorvastatin (LIPITOR) 40 MG Tab TAKE ONE TABLET BY MOUTH ONE TIME DAILY IN THE P.M. 100 Tablet 3    glucose blood strip 1 Strip by Other route as needed. 100 Strip 3    Lancets Lancets order: Lancets for Abbott Freestyle Lite meter. Sig: use once daily and prn ssx high or low sugar. #100 RF x 3 100 Each 3    Multiple Vitamins-Minerals (PRESERVISION AREDS PO) Take  by mouth.      Cholecalciferol (VITAMIN D HIGH POTENCY PO) Take 2,000 mg by mouth every day.      Polyethylene Glycol 400 (BLINK TEARS OP) by Ophthalmic route every " "day.      Calcium 600 MG TABS Take 1 Tab by mouth every morning. Taking 1000mg  Indications: per pt is taking 500 mg      multivitamin (THERAGRAN) TABS Take 1 Tablet by mouth every morning.        Polyethyl Glycol-Propyl Glycol (SYSTANE OP) Place 1 Drop in both eyes every bedtime. Indications: **OTC**       No current facility-administered medications for this visit.       Physical Exam:  Vitals:    10/27/22 1353   BP: 120/68   BP Location: Left arm   Patient Position: Sitting   BP Cuff Size: Adult   Pulse: 81   Resp: 14   SpO2: 96%   Weight: 78.5 kg (173 lb)   Height: 1.6 m (5' 3\")     General appearance: NAD, conversant   Eyes: anicteric sclerae, moist conjunctivae; no lid-lag; PERRLA  HENT: Atraumatic; oropharynx clear with moist mucous membranes and no mucosal ulcerations; normal hard and soft palate  Neck: Trachea midline; FROM, supple, no thyromegaly or lymphadenopathy  Lungs: CTA, with normal respiratory effort and no intercostal retractions  CV: RRR, no MRGs, no JVD  Abdomen: Soft, non-tender; no masses or HSM  Extremities: No peripheral edema or extremity lymphadenopathy  Skin: Normal temperature, turgor and texture; no rash, ulcers or subcutaneous nodules  Psych: Appropriate affect, alert and oriented to person, place and time    Data:  Lipids:   Lab Results   Component Value Date/Time    CHOLSTRLTOT 135 09/09/2022 08:23 AM    TRIGLYCERIDE 84 09/09/2022 08:23 AM    HDL 59 09/09/2022 08:23 AM    LDL 59 09/09/2022 08:23 AM        BMP:  Lab Results   Component Value Date/Time    SODIUM 140 09/09/2022 0823    POTASSIUM 4.5 09/09/2022 0823    CHLORIDE 105 09/09/2022 0823    CO2 27 09/09/2022 0823    GLUCOSE 120 (H) 09/09/2022 0823    BUN 16 09/09/2022 0823    CREATININE 0.81 09/09/2022 0823    CALCIUM 9.2 09/09/2022 0823    ANION 8.0 09/09/2022 0823        TSH:   Lab Results   Component Value Date/Time    TSHULTRASEN 1.630 09/09/2022 0823        THYROXINE (T4):   No results found for: DARLIN     CBC:   Lab " Results   Component Value Date/Time    WBC 5.0 09/09/2022 08:23 AM    RBC 4.66 09/09/2022 08:23 AM    HEMOGLOBIN 14.2 09/09/2022 08:23 AM    HEMATOCRIT 42.7 09/09/2022 08:23 AM    MCV 91.6 09/09/2022 08:23 AM    MCH 30.5 09/09/2022 08:23 AM    MCHC 33.3 (L) 09/09/2022 08:23 AM    RDW 46.8 09/09/2022 08:23 AM    PLATELETCT 282 09/09/2022 08:23 AM    MPV 9.6 09/09/2022 08:23 AM    NEUTSPOLYS 61.70 09/09/2022 08:23 AM    LYMPHOCYTES 22.00 09/09/2022 08:23 AM    MONOCYTES 12.10 09/09/2022 08:23 AM    EOSINOPHILS 2.80 09/09/2022 08:23 AM    BASOPHILS 0.80 09/09/2022 08:23 AM    IMMGRAN 0.60 09/09/2022 08:23 AM    NRBC 0.00 09/09/2022 08:23 AM    NEUTS 3.11 09/09/2022 08:23 AM    LYMPHS 1.11 09/09/2022 08:23 AM    MONOS 0.61 09/09/2022 08:23 AM    EOS 0.14 09/09/2022 08:23 AM    BASO 0.04 09/09/2022 08:23 AM    IMMGRANAB 0.03 09/09/2022 08:23 AM    NRBCAB 0.00 09/09/2022 08:23 AM        CBC w/o DIFF  Lab Results   Component Value Date/Time    WBC 5.0 09/09/2022 08:23 AM    RBC 4.66 09/09/2022 08:23 AM    HEMOGLOBIN 14.2 09/09/2022 08:23 AM    MCV 91.6 09/09/2022 08:23 AM    MCH 30.5 09/09/2022 08:23 AM    MCHC 33.3 (L) 09/09/2022 08:23 AM    RDW 46.8 09/09/2022 08:23 AM    MPV 9.6 09/09/2022 08:23 AM       Prior echo/stress reviewed: Normal echo in 2018    EKG interpreted by me: NSR    Impression/Plan:  1. Atrial fibrillation, unspecified type (HCC)  EKG        pAF, low burden  Anticoagulation management    - Refill Pradaxa, labs reviewed  - Continue metoprolol  - Can add flecainide if needed, would update stress and echo if so    Annual FV    A total of 32 minutes of time was spent on day of encounter reviewing medical record, performing history and examination, counseling, ordering medication/test/consults, collaborating with referring service, and documentation.      Darrian Garcia MD  Cardiac Electrophysiology

## 2022-10-28 NOTE — TELEPHONE ENCOUNTER
Patient assistance paperwork for Pradaxa completed, faxed to [facility], and Scanned into patient chart via [employer].

## 2022-11-05 ENCOUNTER — PATIENT MESSAGE (OUTPATIENT)
Dept: CARDIOLOGY | Facility: MEDICAL CENTER | Age: 72
End: 2022-11-05
Payer: MEDICARE

## 2022-11-05 DIAGNOSIS — E78.5 HYPERLIPIDEMIA, UNSPECIFIED HYPERLIPIDEMIA TYPE: ICD-10-CM

## 2022-11-05 DIAGNOSIS — I10 ESSENTIAL HYPERTENSION: ICD-10-CM

## 2022-11-07 NOTE — TELEPHONE ENCOUNTER
Is the patient due for a refill? Yes    Was the patient seen the past year? Yes    Date of last office visit: 10/27/22    Does the patient have an upcoming appointment?  Yes   If yes, When? 10/13/23    Provider to refill:moses    Does the patients insurance require a 100 day supply?  Yes

## 2022-11-08 RX ORDER — ATORVASTATIN CALCIUM 40 MG/1
40 TABLET, FILM COATED ORAL EVERY EVENING
Qty: 100 TABLET | Refills: 3 | Status: SHIPPED | OUTPATIENT
Start: 2022-11-08 | End: 2023-12-08

## 2022-11-12 LAB — EKG IMPRESSION: NORMAL

## 2022-11-21 ENCOUNTER — TELEPHONE (OUTPATIENT)
Dept: CARDIOLOGY | Facility: MEDICAL CENTER | Age: 72
End: 2022-11-21

## 2022-11-21 PROCEDURE — RXMED WILLOW AMBULATORY MEDICATION CHARGE: Performed by: INTERNAL MEDICINE

## 2022-11-21 RX ORDER — DABIGATRAN ETEXILATE 75 MG/1
150 CAPSULE ORAL 2 TIMES DAILY
Qty: 36 CAPSULE | Refills: 0 | Status: SHIPPED | OUTPATIENT
Start: 2022-11-21 | End: 2022-12-01 | Stop reason: SDUPTHER

## 2022-11-21 NOTE — TELEPHONE ENCOUNTER
MAIDA    Caller: - Cristela    Medication Name and Dosage:    dabigatran (PRADAXA) 150 MG Cap capsule [[id number]]    Medication amount left: 0    Preferred Pharmacy:   [facility] - [phone]  [address]  RX - [phone]    Other questions (Topic): -Please send a short supply to [facility]     Callback Number (Will only call for issues):   [phone]    Thank you,   Layne ALTAMIRANO

## 2022-11-22 ENCOUNTER — PHARMACY VISIT (OUTPATIENT)
Dept: PHARMACY | Facility: MEDICAL CENTER | Age: 72
End: 2022-11-22
Payer: COMMERCIAL

## 2022-11-28 DIAGNOSIS — I10 ESSENTIAL HYPERTENSION: ICD-10-CM

## 2022-11-28 RX ORDER — LISINOPRIL 5 MG/1
5 TABLET ORAL DAILY
Qty: 100 TABLET | Refills: 0 | Status: SHIPPED | OUTPATIENT
Start: 2022-11-28 | End: 2023-01-05 | Stop reason: SDUPTHER

## 2022-11-28 NOTE — TELEPHONE ENCOUNTER
Requested Prescriptions     Pending Prescriptions Disp Refills   • lisinopril (PRINIVIL) 5 MG Tab [Pharmacy Med Name: LISINOPRIL 5 MG TABLET] 100 Tablet 0     Sig: TAKE 1 TABLET BY MOUTH EVERY DAY   Telma Rene M.D.

## 2022-12-01 PROCEDURE — RXMED WILLOW AMBULATORY MEDICATION CHARGE: Performed by: INTERNAL MEDICINE

## 2022-12-01 RX ORDER — DABIGATRAN ETEXILATE 75 MG/1
150 CAPSULE ORAL 2 TIMES DAILY
Qty: 36 CAPSULE | Refills: 0 | Status: SHIPPED | OUTPATIENT
Start: 2022-12-01 | End: 2022-12-11

## 2022-12-02 ENCOUNTER — PHARMACY VISIT (OUTPATIENT)
Dept: PHARMACY | Facility: MEDICAL CENTER | Age: 72
End: 2022-12-02
Payer: COMMERCIAL

## 2023-01-05 ENCOUNTER — OFFICE VISIT (OUTPATIENT)
Dept: MEDICAL GROUP | Facility: PHYSICIAN GROUP | Age: 73
End: 2023-01-05
Payer: MEDICARE

## 2023-01-05 VITALS
WEIGHT: 173.9 LBS | HEART RATE: 67 BPM | DIASTOLIC BLOOD PRESSURE: 60 MMHG | OXYGEN SATURATION: 96 % | HEIGHT: 63 IN | BODY MASS INDEX: 30.81 KG/M2 | RESPIRATION RATE: 12 BRPM | SYSTOLIC BLOOD PRESSURE: 110 MMHG | TEMPERATURE: 96.6 F

## 2023-01-05 DIAGNOSIS — Z78.9 POLST (PHYSICIAN ORDERS FOR LIFE-SUSTAINING TREATMENT): ICD-10-CM

## 2023-01-05 DIAGNOSIS — E11.9 TYPE 2 DIABETES MELLITUS WITHOUT COMPLICATION, WITHOUT LONG-TERM CURRENT USE OF INSULIN (HCC): ICD-10-CM

## 2023-01-05 DIAGNOSIS — I48.0 HYPERCOAGULABLE STATE DUE TO PAROXYSMAL ATRIAL FIBRILLATION (HCC): ICD-10-CM

## 2023-01-05 DIAGNOSIS — I48.0 PAROXYSMAL ATRIAL FIBRILLATION (HCC): ICD-10-CM

## 2023-01-05 DIAGNOSIS — I10 ESSENTIAL (PRIMARY) HYPERTENSION: ICD-10-CM

## 2023-01-05 DIAGNOSIS — D68.69 HYPERCOAGULABLE STATE DUE TO PAROXYSMAL ATRIAL FIBRILLATION (HCC): ICD-10-CM

## 2023-01-05 LAB
HBA1C MFR BLD: 6.4 % (ref 0–5.6)
INT CON NEG: ABNORMAL
INT CON POS: ABNORMAL

## 2023-01-05 PROCEDURE — 83036 HEMOGLOBIN GLYCOSYLATED A1C: CPT | Performed by: INTERNAL MEDICINE

## 2023-01-05 PROCEDURE — 99214 OFFICE O/P EST MOD 30 MIN: CPT | Performed by: INTERNAL MEDICINE

## 2023-01-05 RX ORDER — LISINOPRIL 5 MG/1
5 TABLET ORAL DAILY
Qty: 100 TABLET | Refills: 3 | Status: SHIPPED | OUTPATIENT
Start: 2023-01-05 | End: 2024-03-18

## 2023-01-05 ASSESSMENT — FIBROSIS 4 INDEX: FIB4 SCORE: 1.24

## 2023-01-05 ASSESSMENT — PATIENT HEALTH QUESTIONNAIRE - PHQ9: CLINICAL INTERPRETATION OF PHQ2 SCORE: 0

## 2023-01-05 NOTE — ASSESSMENT & PLAN NOTE
Chronic and stable problem, blood pressure at goal on current regimen, continue lisinopril 5 mg daily and metoprolol tartrate 25 mg twice daily.

## 2023-01-05 NOTE — LETTER
[employer]  Temitope Bess D.O.  [address]  [place] [address]  Fax: [phone]   Authorization for Release/Disclosure of   Protected Health Information   Name: ALVARO ARTEAGA : 1950 SSN: [id number]   Address: [facility] [address] [place] [address] Phone:    [phone] (home)    I authorize the entity listed below to release/disclose the PHI below to:   [employer]/Temitope Bess D.O. and Temitope Bess D.O.   Provider or Entity Name:  [facility]   Address   City, [facility], [facility]   Phone:      Fax:     Reason for request: continuity of care   Information to be released:    [  ] LAST COLONOSCOPY,  including any PATH REPORT and follow-up  [  ] LAST FIT/COLOGUARD RESULT [  ] LAST DEXA  [  ] LAST MAMMOGRAM  [  ] LAST PAP  [  ] LAST LABS [  ] RETINA EXAM REPORT  [  ] IMMUNIZATION RECORDS  [ x ] Release all info      [ x ] Check here and initial the line next to each item to release ALL health information INCLUDING  _____ Care and treatment for drug and / or alcohol abuse  _____ HIV testing, infection status, or AIDS  _____ Genetic Testing    DATES OF SERVICE OR TIME PERIOD TO BE DISCLOSED: _____________  I understand and acknowledge that:  * This Authorization may be revoked at any time by you in writing, except if your health information has already been used or disclosed.  * Your health information that will be used or disclosed as a result of you signing this authorization could be re-disclosed by the recipient. If this occurs, your re-disclosed health information may no longer be protected by State or Federal laws.  * You may refuse to sign this Authorization. Your refusal will not affect your ability to obtain treatment.  * This Authorization becomes effective upon signing and will  on (date) __________.      If no date is indicated, this Authorization will  one (1) year from the signature date.    Name: Alvaro Arteaga    Signature:   Date:      1/5/2023       PLEASE FAX REQUESTED RECORDS BACK TO: [phone]

## 2023-01-05 NOTE — ASSESSMENT & PLAN NOTE
Chronic and stable problem, continue metoprolol tartrate 25 mg twice daily and dabigatran 150 mg daily. Follow up with cardiology as recommended.

## 2023-01-05 NOTE — ASSESSMENT & PLAN NOTE
New problem, POLST form completed in clinic, FULL CODE, names/numbers of mPOA on form and added to her chart.

## 2023-01-05 NOTE — PROGRESS NOTES
Subjective:   Chief Complaint/History of Present Illness:  Cristela Alan is a 72 y.o. female established patient who presents today to discuss medical problems as listed below. Cristela is accompanied by her , Cristino.    Problem   POLST- FULL CODE    POLST completed in clinic 1/5/2023, full code. Spouse Cristino, daughter Nikki, and eugene Posey are her medical power of attorneys.      Type 2 Diabetes Mellitus Without Complication, Without Long-Term Current Use of Insulin (Hcc)     Latest Reference Range & Units 01/05/23 12:00   Glycohemoglobin 0.0 - 5.6 % 6.4 !     She technically met criteria for type 2 diabetes as of May 2019.  She has never taken any glucose lowering medications.  She is on statin therapy for history of elevated cholesterol and TIA and she is also on ACE inhibitor for history of hypertension.  No known history of retinopathy or nephropathy. She has mild chronic neuropathy likely from a nondiabetic source.     Paroxysmal Atrial Fibrillation (Hcc)    She reports waking up one morning and experiencing palpitations.  Upon further evaluation she was found to be in atrial fibrillation.  This was in December 2012.  She was treated with cardioversion and since that time has maintained normal sinus rhythm.  She notes very occasionally she will feel palpitations, and thus it is labeled as paroxysmal atrial fibrillation.  She has a rate control approach with metoprolol tartrate 25 mg twice daily.  She is also on Pradaxa for systemic anticoagulation and stroke risk management.  She follows with cardiology, Dr. Avery, annually, and more recently Dr. Garcia with EP twice annually.    Regimen: metoprolol tartrate 25 mg twice daily and Pradaxa 150 mg daily       Hypercoagulable State Due to Paroxysmal Atrial Fibrillation (Hcc)    She has been using Pradaxa since 2012 due to paroxysmal atrial fibrillation.  No complications of bruising or bleeding at this time.     Essential (Primary) Hypertension    She has a  longstanding history of hypertension.  She is on dual therapy.  She has diagnosis of paroxysmal atrial fibrillation and diastolic dysfunction.  Most recent echocardiogram was stable and EKG has maintained normal sinus rhythm.  No chest pain or pressure, no dyspnea on exertion, no presyncope or syncope.  Electrolytes and kidney function are stable.  No signs or symptoms of orthostasis.    Blood pressure in clinic ranges 102-120/60-68    Current regimen: Lisinopril 5 mg daily and metoprolol tartrate 25 mg twice daily          Current Medications:  Current Outpatient Medications Ordered in Epic   Medication Sig Dispense Refill    lisinopril (PRINIVIL) 5 MG Tab Take 1 Tablet by mouth every day. 100 Tablet 3    metoprolol tartrate (LOPRESSOR) 25 MG Tab TAKE 1 TABLET BY MOUTH TWICE A  Tablet 3    atorvastatin (LIPITOR) 40 MG Tab Take 1 Tablet by mouth every evening. 100 Tablet 3    dabigatran (PRADAXA) 150 MG Cap capsule TAKE ONE CAPSULE BY MOUTH TWICE DAILY 180 Capsule 0    glucose blood strip 1 Strip by Other route as needed. 100 Strip 3    Lancets Lancets order: Lancets for Abbott Freestyle Lite meter. Sig: use once daily and prn ssx high or low sugar. #100 RF x 3 100 Each 3    Multiple Vitamins-Minerals (PRESERVISION AREDS PO) Take  by mouth.      Cholecalciferol (VITAMIN D HIGH POTENCY PO) Take 2,000 mg by mouth every day.      Polyethylene Glycol 400 (BLINK TEARS OP) by Ophthalmic route every day.      Calcium 600 MG TABS Take 1 Tab by mouth every morning. Taking 1000mg  Indications: per pt is taking 500 mg      multivitamin (THERAGRAN) TABS Take 1 Tablet by mouth every morning.        Polyethyl Glycol-Propyl Glycol (SYSTANE OP) Place 1 Drop in both eyes every bedtime. Indications: **OTC**       No current Epic-ordered facility-administered medications on file.          Objective:   Physical Exam:    Vitals: /60 (BP Location: Left arm, Patient Position: Sitting, BP Cuff Size: Adult)   Pulse 67   Temp  "35.9 °C (96.6 °F) (Temporal)   Resp 12   Ht 1.6 m (5' 3\")   Wt 78.9 kg (173 lb 14.4 oz)   SpO2 96%    BMI: Body mass index is 30.81 kg/m².  Physical Exam  Constitutional:       General: She is not in acute distress.     Appearance: Normal appearance. She is not ill-appearing.   HENT:      Right Ear: There is no impacted cerumen.      Left Ear: There is no impacted cerumen.      Mouth/Throat:      Comments: Normal phonation  Eyes:      General: No scleral icterus.     Conjunctiva/sclera: Conjunctivae normal.   Cardiovascular:      Rate and Rhythm: Normal rate and regular rhythm.      Pulses: Normal pulses.   Pulmonary:      Effort: Pulmonary effort is normal. No respiratory distress.      Breath sounds: No wheezing or rhonchi.   Abdominal:      General: Bowel sounds are normal.      Palpations: Abdomen is soft.   Skin:     General: Skin is warm and dry.      Findings: No rash.      Comments: Healing abrasion on right knee   Neurological:      Gait: Gait normal.   Psychiatric:         Mood and Affect: Mood normal.         Behavior: Behavior normal.         Thought Content: Thought content normal.         Judgment: Judgment normal.        Assessment and Plan:   Cristela is a 72 y.o. female with the following:  Problem List Items Addressed This Visit       Paroxysmal atrial fibrillation (HCC)     Chronic and stable problem, continue metoprolol tartrate 25 mg twice daily and dabigatran 150 mg daily. Follow up with cardiology as recommended.          Relevant Medications    lisinopril (PRINIVIL) 5 MG Tab    Hypercoagulable state due to paroxysmal atrial fibrillation (HCC)     Chronic and ongoing problem, Pradaxa will be transitioned to dabigatran due to going off patent, continue 150 mg daily for stroke prophylaxis.         Relevant Medications    lisinopril (PRINIVIL) 5 MG Tab    Essential (primary) hypertension     Chronic and stable problem, blood pressure at goal on current regimen, continue lisinopril 5 mg daily and " metoprolol tartrate 25 mg twice daily.         Relevant Medications    lisinopril (PRINIVIL) 5 MG Tab    Type 2 diabetes mellitus without complication, without long-term current use of insulin (HCC)     Chronic and stable problem, A1c in the prediabetic range with lifestyle approach, no indication for glucose lowering medications at this time. Request retinal eye exam records. No microalbuminuria. Next foot exam due after March 2023.         Relevant Orders    POCT  A1C (Completed)    CBC WITH DIFFERENTIAL    Comp Metabolic Panel    Lipid Profile    HEMOGLOBIN A1C    POLST- FULL CODE     New problem, POLST form completed in clinic, FULL CODE, names/numbers of mPOA on form and added to her chart.              RTC: Return in about 6 months (around 7/5/2023) for AWV.    I spent a total of 34 minutes with record review, exam, communication with the patient, communication with other providers, and documentation of this encounter.    PLEASE NOTE: This dictation was created using voice recognition software. I have made every reasonable attempt to correct obvious errors, but I expect that there are errors of grammar and possibly content that I did not discover before finalizing the note.      Temitope Bess, DO  Geriatric and Internal Medicine  [facility]

## 2023-01-05 NOTE — ASSESSMENT & PLAN NOTE
Chronic and stable problem, A1c in the prediabetic range with lifestyle approach, no indication for glucose lowering medications at this time. Request retinal eye exam records. No microalbuminuria. Next foot exam due after March 2023.

## 2023-01-05 NOTE — LETTER
[employer]  Temitope Bess D.O.  [address]  [place] [address]  Fax: [phone]   Authorization for Release/Disclosure of   Protected Health Information   Name: ALVARO ARTEAGA : 1950 SSN: [id number]   Address: [facility] [place] [address] Phone:    [phone] (home)    I authorize the entity listed below to release/disclose the PHI below to:   [employer]/Temitope Bess D.O. and Temitope Bess D.O.   Provider or Entity Name:  [facility]      Address   City, State, Zip   Phone:      Fax:     Reason for request: continuity of care   Information to be released:    [  ] LAST COLONOSCOPY,  including any PATH REPORT and follow-up  [  ] LAST FIT/COLOGUARD RESULT [  ] LAST DEXA  [  ] LAST MAMMOGRAM  [  ] LAST PAP  [  ] LAST LABS [  ] RETINA EXAM REPORT  [  ] IMMUNIZATION RECORDS  [  ] Release all info      [  ] Check here and initial the line next to each item to release ALL health information INCLUDING  _____ Care and treatment for drug and / or alcohol abuse  _____ HIV testing, infection status, or AIDS  _____ Genetic Testing    DATES OF SERVICE OR TIME PERIOD TO BE DISCLOSED: _____________  I understand and acknowledge that:  * This Authorization may be revoked at any time by you in writing, except if your health information has already been used or disclosed.  * Your health information that will be used or disclosed as a result of you signing this authorization could be re-disclosed by the recipient. If this occurs, your re-disclosed health information may no longer be protected by State or Federal laws.  * You may refuse to sign this Authorization. Your refusal will not affect your ability to obtain treatment.  * This Authorization becomes effective upon signing and will  on (date) __________.      If no date is indicated, this Authorization will  one (1) year from the signature date.    Name: Alvaro Arteaga    Signature:   Date:     2023        PLEASE FAX REQUESTED RECORDS BACK TO: [phone]

## 2023-01-05 NOTE — ASSESSMENT & PLAN NOTE
Chronic and ongoing problem, Pradaxa will be transitioned to dabigatran due to going off patent, continue 150 mg daily for stroke prophylaxis.

## 2023-02-17 DIAGNOSIS — I48.91 ATRIAL FIBRILLATION, UNSPECIFIED TYPE (HCC): ICD-10-CM

## 2023-02-17 RX ORDER — DABIGATRAN ETEXILATE 150 MG/1
CAPSULE ORAL
Qty: 200 CAPSULE | Refills: 1 | Status: SHIPPED | OUTPATIENT
Start: 2023-02-17 | End: 2023-03-14 | Stop reason: SDUPTHER

## 2023-02-21 ENCOUNTER — PATIENT MESSAGE (OUTPATIENT)
Dept: CARDIOLOGY | Facility: MEDICAL CENTER | Age: 73
End: 2023-02-21
Payer: MEDICARE

## 2023-02-23 ENCOUNTER — TELEPHONE (OUTPATIENT)
Dept: CARDIOLOGY | Facility: MEDICAL CENTER | Age: 73
End: 2023-02-23
Payer: MEDICARE

## 2023-02-23 NOTE — TELEPHONE ENCOUNTER
LYDIA Archibald Loges Key: [id number] help? Call us at [phone]  Status  New(Not sent to plan)  Drug  Pradaxa 150MG capsules  Form  MedImpact ePA Form 2017 NCPDP

## 2023-02-23 NOTE — TELEPHONE ENCOUNTER
PA unable to be completed through [facility], called [employer] and answered clinical questions. Original request was closed but we were able to start a new request. Spoke with Patty Archibald Loges Key: [id number] help? Call us at [phone]  Outcome  Additional Information Required  A prior authorization request for this medication is currently in-progress with [employer]. If you have any questions about this request, please contact [phone].  Drug  Pradaxa 150MG capsules  Form  MedImpact ePA Form 2017 NCPDP

## 2023-03-13 ENCOUNTER — TELEPHONE (OUTPATIENT)
Dept: CARDIOLOGY | Facility: MEDICAL CENTER | Age: 73
End: 2023-03-13

## 2023-03-13 NOTE — TELEPHONE ENCOUNTER
Caller: Cristela Reyna Loges     Medication Name and Dosage:  dabigatran (PRADAXA) 150 MG Cap capsule [[id number]    Medication amount left: 0    Preferred Pharmacy: [facility] not [facility] any [facility]. [facility]    Other questions (Topic): Pt is not sure how she is to get this medication. She is not able to find a Pharmacy that has it and she is completely out. Would like a call back to discuss    Callback Number (Will only call for issues): [phone] (home)       Thank You  Taty TAVAREZ

## 2023-03-14 ENCOUNTER — PHARMACY VISIT (OUTPATIENT)
Dept: PHARMACY | Facility: MEDICAL CENTER | Age: 73
End: 2023-03-14
Payer: COMMERCIAL

## 2023-03-14 DIAGNOSIS — I48.91 ATRIAL FIBRILLATION, UNSPECIFIED TYPE (HCC): ICD-10-CM

## 2023-03-14 PROCEDURE — RXMED WILLOW AMBULATORY MEDICATION CHARGE: Performed by: INTERNAL MEDICINE

## 2023-03-14 RX ORDER — DABIGATRAN ETEXILATE 150 MG/1
CAPSULE ORAL
Qty: 180 CAPSULE | Refills: 1 | Status: SHIPPED | OUTPATIENT
Start: 2023-03-14 | End: 2023-10-25

## 2023-03-27 ENCOUNTER — HOSPITAL ENCOUNTER (OUTPATIENT)
Dept: LAB | Facility: MEDICAL CENTER | Age: 73
End: 2023-03-27
Attending: INTERNAL MEDICINE
Payer: MEDICARE

## 2023-03-27 DIAGNOSIS — E11.9 TYPE 2 DIABETES MELLITUS WITHOUT COMPLICATION, WITHOUT LONG-TERM CURRENT USE OF INSULIN (HCC): ICD-10-CM

## 2023-03-27 LAB
ALBUMIN SERPL BCP-MCNC: 4 G/DL (ref 3.2–4.9)
ALBUMIN/GLOB SERPL: 1.5 G/DL
ALP SERPL-CCNC: 89 U/L (ref 30–99)
ALT SERPL-CCNC: 18 U/L (ref 2–50)
ANION GAP SERPL CALC-SCNC: 6 MMOL/L (ref 7–16)
AST SERPL-CCNC: 19 U/L (ref 12–45)
BASOPHILS # BLD AUTO: 0.9 % (ref 0–1.8)
BASOPHILS # BLD: 0.05 K/UL (ref 0–0.12)
BILIRUB SERPL-MCNC: 1 MG/DL (ref 0.1–1.5)
BUN SERPL-MCNC: 15 MG/DL (ref 8–22)
CALCIUM ALBUM COR SERPL-MCNC: 9.5 MG/DL (ref 8.5–10.5)
CALCIUM SERPL-MCNC: 9.5 MG/DL (ref 8.5–10.5)
CHLORIDE SERPL-SCNC: 104 MMOL/L (ref 96–112)
CHOLEST SERPL-MCNC: 144 MG/DL (ref 100–199)
CO2 SERPL-SCNC: 27 MMOL/L (ref 20–33)
CREAT SERPL-MCNC: 0.77 MG/DL (ref 0.5–1.4)
EOSINOPHIL # BLD AUTO: 0.1 K/UL (ref 0–0.51)
EOSINOPHIL NFR BLD: 1.9 % (ref 0–6.9)
ERYTHROCYTE [DISTWIDTH] IN BLOOD BY AUTOMATED COUNT: 47.5 FL (ref 35.9–50)
EST. AVERAGE GLUCOSE BLD GHB EST-MCNC: 143 MG/DL
GFR SERPLBLD CREATININE-BSD FMLA CKD-EPI: 81 ML/MIN/1.73 M 2
GLOBULIN SER CALC-MCNC: 2.6 G/DL (ref 1.9–3.5)
GLUCOSE SERPL-MCNC: 133 MG/DL (ref 65–99)
HBA1C MFR BLD: 6.6 % (ref 4–5.6)
HCT VFR BLD AUTO: 42.9 % (ref 37–47)
HDLC SERPL-MCNC: 63 MG/DL
HGB BLD-MCNC: 14.1 G/DL (ref 12–16)
IMM GRANULOCYTES # BLD AUTO: 0.02 K/UL (ref 0–0.11)
IMM GRANULOCYTES NFR BLD AUTO: 0.4 % (ref 0–0.9)
LDLC SERPL CALC-MCNC: 63 MG/DL
LYMPHOCYTES # BLD AUTO: 0.97 K/UL (ref 1–4.8)
LYMPHOCYTES NFR BLD: 18.3 % (ref 22–41)
MCH RBC QN AUTO: 30.5 PG (ref 27–33)
MCHC RBC AUTO-ENTMCNC: 32.9 G/DL (ref 33.6–35)
MCV RBC AUTO: 92.7 FL (ref 81.4–97.8)
MONOCYTES # BLD AUTO: 0.51 K/UL (ref 0–0.85)
MONOCYTES NFR BLD AUTO: 9.6 % (ref 0–13.4)
NEUTROPHILS # BLD AUTO: 3.66 K/UL (ref 2–7.15)
NEUTROPHILS NFR BLD: 68.9 % (ref 44–72)
NRBC # BLD AUTO: 0 K/UL
NRBC BLD-RTO: 0 /100 WBC
PLATELET # BLD AUTO: 271 K/UL (ref 164–446)
PMV BLD AUTO: 9.9 FL (ref 9–12.9)
POTASSIUM SERPL-SCNC: 5.1 MMOL/L (ref 3.6–5.5)
PROT SERPL-MCNC: 6.6 G/DL (ref 6–8.2)
RBC # BLD AUTO: 4.63 M/UL (ref 4.2–5.4)
SODIUM SERPL-SCNC: 137 MMOL/L (ref 135–145)
TRIGL SERPL-MCNC: 90 MG/DL (ref 0–149)
WBC # BLD AUTO: 5.3 K/UL (ref 4.8–10.8)

## 2023-03-27 PROCEDURE — 36415 COLL VENOUS BLD VENIPUNCTURE: CPT

## 2023-03-27 PROCEDURE — 80053 COMPREHEN METABOLIC PANEL: CPT

## 2023-03-27 PROCEDURE — 85025 COMPLETE CBC W/AUTO DIFF WBC: CPT

## 2023-03-27 PROCEDURE — 80061 LIPID PANEL: CPT

## 2023-03-27 PROCEDURE — 83036 HEMOGLOBIN GLYCOSYLATED A1C: CPT

## 2023-04-07 ENCOUNTER — OFFICE VISIT (OUTPATIENT)
Dept: CARDIOLOGY | Facility: MEDICAL CENTER | Age: 73
End: 2023-04-07
Payer: MEDICARE

## 2023-04-07 VITALS
HEART RATE: 70 BPM | WEIGHT: 170 LBS | RESPIRATION RATE: 17 BRPM | DIASTOLIC BLOOD PRESSURE: 68 MMHG | HEIGHT: 63 IN | OXYGEN SATURATION: 95 % | SYSTOLIC BLOOD PRESSURE: 122 MMHG | BODY MASS INDEX: 30.12 KG/M2

## 2023-04-07 DIAGNOSIS — I10 ESSENTIAL (PRIMARY) HYPERTENSION: ICD-10-CM

## 2023-04-07 DIAGNOSIS — I65.23 BILATERAL CAROTID ARTERY STENOSIS: ICD-10-CM

## 2023-04-07 DIAGNOSIS — Z79.01 CHRONIC ANTICOAGULATION: ICD-10-CM

## 2023-04-07 DIAGNOSIS — I48.0 PAROXYSMAL ATRIAL FIBRILLATION (HCC): ICD-10-CM

## 2023-04-07 DIAGNOSIS — E78.00 PURE HYPERCHOLESTEROLEMIA: ICD-10-CM

## 2023-04-07 PROCEDURE — 99214 OFFICE O/P EST MOD 30 MIN: CPT | Performed by: INTERNAL MEDICINE

## 2023-04-07 ASSESSMENT — ENCOUNTER SYMPTOMS
VOMITING: 0
BRUISES/BLEEDS EASILY: 0
CLAUDICATION: 0
WEAKNESS: 0
RESPIRATORY NEGATIVE: 1
FEVER: 0
DEPRESSION: 0
WEIGHT LOSS: 1
PALPITATIONS: 1
FOCAL WEAKNESS: 0
COUGH: 0
NEUROLOGICAL NEGATIVE: 1
NAUSEA: 0
BLURRED VISION: 0
GASTROINTESTINAL NEGATIVE: 1
DOUBLE VISION: 0
MYALGIAS: 0
SHORTNESS OF BREATH: 0
DIZZINESS: 0
EYES NEGATIVE: 1
NERVOUS/ANXIOUS: 0
CHILLS: 0
PSYCHIATRIC NEGATIVE: 1
ABDOMINAL PAIN: 0
MUSCULOSKELETAL NEGATIVE: 1
HEADACHES: 0

## 2023-04-07 ASSESSMENT — FIBROSIS 4 INDEX: FIB4 SCORE: 1.22

## 2023-04-07 NOTE — PROGRESS NOTES
"Chief Complaint   Patient presents with    Atrial Fibrillation     F/V DX: Chronic atrial fibrillation (HCC)        Subjective     Cristela Alan is a 72 y.o. female who presents today for follow up of atrial fibrillation on chronic anticoagulation therapy.    Since the patient's last visit on 11/17/20, she has been doing well clinically. She admits to occasional palpitations. She denies chest pain, shortness of breath, palpitations, nausea/vomiting or diaphoresis. She is under stress with her  currently admitted for respiratory issues. She has lost weight by increasing her activities.     Past Medical History:   Diagnosis Date    Allergic rhinitis     Anesthesia     hypotension    Atrial fibrillation (HCC)     Cancer (HCC) 2014    endometrial    CATARACT     bilateral lens implants    Cholesterol blood decreased     Dental disorder     dentures    Dermatitis, contact     Dyslipidemia 6/30/2016    Elevated alkaline phosphatase level 9/20/2021      Ref. Range 3/18/2021 08:22 9/3/2021 08:23  Alkaline Phosphatase Latest Ref Range: 30 - 99 U/L 85 104 (H)   New finding of mildly elevated alkaline phosphatase.  She has history of endometrial cancer.  She has osteopenia with elevated FRAX.  No known primary liver disease.    Encounter for general adult medical examination without abnormal findings 12/2/2014    Alexia for GI No flu shots- egg allergies  Declines all vaccines- had reaction to tdap    Endometrium cancer (HCC)     Hyperlipidemia     Hypertension     Impaired glucose tolerance     Macular degeneration     Obesity     Pneumonia     had PNU 35 years ago    Prolapsed uterus 8/2014    current issue    Pulmonary nodule 11/9/2016    No history of tobacco use CT 9/16, 12/16- no change-6/17- ? New nodule>>PET CT-10/17 neg     Stroke (HCC)     \"mini strokes\" TIA, no residual    TIA (transient ischemic attack)     Unspecified hemorrhagic conditions     NOSE BLEEDS/on Plavix    Vitamin D deficiency      Past " "Surgical History:   Procedure Laterality Date    HYSTERECTOMY ROBOTIC XI  8/28/2014    Performed by Pantera Wylie M.D. at SURGERY [facility] ORS    LYMPH NODE DISSECTION ROBOTIC XI  8/28/2014    Performed by Pantera Wylie M.D. at SURGERY [facility] ORS    CATARACT PHACO WITH IOL  8/19/2010    Performed by DAVE AGUILAR at SURGERY SAME DAY [facility] ORS    CATARACT PHACO WITH IOL  8/5/2010    Performed by DAVE AGUILAR at SURGERY SAME DAY [facility] ORS    HIP ARTHROSCOPY  5/12/08    Performed by ZINA HOLLIS at SURGERY [facility] ORS    ACETABULAR OSTEOTOMY  5/12/08    Performed by ZINA HOLLIS at SURGERY [facility]    ORTHOPEDIC OSTEOTOMY  5/12/08    Performed by ZINA HOLLIS at SURGERY [facility] ORS    NASAL POLYPECTOMY  1994    \"removal of papilloma\"    ABDOMINAL HYSTERECTOMY TOTAL      EYE SURGERY       Family History   Problem Relation Age of Onset    Hyperlipidemia Mother     Hypertension Mother     Cancer Mother     No Known Problems Father     Heart Disease Sister     Alcohol/Drug Sister     Hypertension Sister     Heart Attack Sister     Cancer Sister 66        ? breast cancer as well    Hypertension Sister     Hyperlipidemia Sister     Cancer Other     Hypertension Other     Diabetes Maternal Grandmother         elderly    Other Sister         suicide     Stroke Neg Hx      Social History     Socioeconomic History    Marital status:      Spouse name: Not on file    Number of children: Not on file    Years of education: Not on file    Highest education level: Not on file   Occupational History    Not on file   Tobacco Use    Smoking status: Never    Smokeless tobacco: Never    Tobacco comments:     continued abstinence   Vaping Use    Vaping Use: Never used   Substance and Sexual Activity    Alcohol use: No    Drug use: No    Sexual activity: Not Currently   Other Topics Concern    Not on file   Social History Narrative    She is  to Art for 49 years, they met at a " [place]. She has a daughter Nikki (43, [place]) and she has 2 kids. She retired in her late 60's. 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     Social Determinants of Health     Financial Resource Strain: Not on file   Food Insecurity: Not on file   Transportation Needs: Not on file   Physical Activity: Not on file   Stress: Not on file   Social Connections: Not on file   Intimate Partner Violence: Not on file   Housing Stability: Not on file     Allergies   Allergen Reactions    Fentanyl Anaphylaxis    Flu Virus Vaccine     Versed Shortness of Breath and Vomiting    Bee      And  spider--- tongue swelling    Codeine      Increased heart rate    Eggs Vomiting    Lidocaine Hcl      \"severe drop in BP\"    Other Drug      Novocaine hypotension ALL \"MELISA\"    Penicillins      Increased heart rate    Soap Rash     Laundry Tide soap, Dove and Ivory soap    Tape Rash     Paper tape OK    Xylocaine [Lidocaine Hcl, Local Anesth.]      All \"MELISA\" hypotension     (Medications reviewed.)  Outpatient Encounter Medications as of 4/7/2023   Medication Sig Dispense Refill    dabigatran (PRADAXA) 150 MG Cap capsule TAKE ONE CAPSULE BY MOUTH TWICE DAILY 180 Capsule 1    lisinopril (PRINIVIL) 5 MG Tab Take 1 Tablet by mouth every day. 100 Tablet 3    metoprolol tartrate (LOPRESSOR) 25 MG Tab TAKE 1 TABLET BY MOUTH TWICE A  Tablet 3    atorvastatin (LIPITOR) 40 MG Tab Take 1 Tablet by mouth every evening. 100 Tablet 3    glucose blood strip 1 Strip by Other route as needed. 100 Strip 3    Lancets Lancets order: Lancets for Abbott Freestyle Lite meter. Sig: use once daily and prn ssx high or low sugar. #100 RF x 3 100 Each 3    Multiple Vitamins-Minerals (PRESERVISION AREDS PO) Take  by mouth.      Cholecalciferol (VITAMIN D HIGH POTENCY PO) Take 2,000 mg by mouth every day.      Polyethylene Glycol 400 (BLINK TEARS OP) by Ophthalmic route every day.      Calcium 600 MG TABS Take 1 Tab by mouth every morning. " "Taking 1000mg  Indications: per pt is taking 500 mg      multivitamin (THERAGRAN) TABS Take 1 Tablet by mouth every morning.        Polyethyl Glycol-Propyl Glycol (SYSTANE OP) Place 1 Drop in both eyes every bedtime. Indications: **OTC**       No facility-administered encounter medications on file as of 4/7/2023.     Review of Systems   Constitutional:  Positive for weight loss. Negative for chills, fever and malaise/fatigue.   HENT: Negative.  Negative for hearing loss.    Eyes: Negative.  Negative for blurred vision and double vision.   Respiratory: Negative.  Negative for cough and shortness of breath.    Cardiovascular:  Positive for palpitations. Negative for chest pain, claudication and leg swelling.   Gastrointestinal: Negative.  Negative for abdominal pain, nausea and vomiting.   Genitourinary: Negative.  Negative for dysuria and urgency.   Musculoskeletal: Negative.  Negative for joint pain and myalgias.   Skin: Negative.  Negative for itching and rash.   Neurological: Negative.  Negative for dizziness, focal weakness, weakness and headaches.   Endo/Heme/Allergies: Negative.  Does not bruise/bleed easily.   Psychiatric/Behavioral: Negative.  Negative for depression. The patient is not nervous/anxious.             Objective     /68 (BP Location: Left arm, Patient Position: Sitting, BP Cuff Size: Adult)   Pulse 70   Resp 17   Ht 1.6 m (5' 3\")   Wt 77.1 kg (170 lb)   SpO2 95%   BMI 30.11 kg/m²     Physical Exam  Constitutional:       Appearance: Normal appearance. She is well-developed and normal weight.   HENT:      Head: Normocephalic and atraumatic.   Neck:      Vascular: No JVD.   Cardiovascular:      Rate and Rhythm: Normal rate and regular rhythm.      Heart sounds: Normal heart sounds.   Pulmonary:      Effort: Pulmonary effort is normal.      Breath sounds: Normal breath sounds.   Abdominal:      General: Bowel sounds are normal.      Palpations: Abdomen is soft.      Comments: No " hepatosplenomegaly.   Musculoskeletal:         General: Normal range of motion.   Lymphadenopathy:      Cervical: No cervical adenopathy.   Skin:     General: Skin is warm and dry.   Neurological:      Mental Status: She is alert and oriented to person, place, and time.          CARDIAC STUDIES/PROCEDURES:     CAROTID ULTRASOUND (06/18/18)  Mild bilateral internal carotid artery stenosis (<50%).     ECHOCARDIOGRAM CONCLUSIONS (06/18/18)  Compared to the images of the prior study.   Normal left ventricular systolic function.  Left ventricular ejection fraction is visually estimated to be 75%.  Grade II diastolic dysfunction.  No significant valve abnormalities.      ECHOCARDIOGRAM CONCLUSIONS (12/05/12)  Technically difficult study  Normal left ventricular size, thickness, systolic function, and diastolic function.  Left ventricular ejection fraction is 55% to 60%.  Structurally normal mitral valve.  No stenosis or regurgitation seen.  Structurally normal aortic valve.  No stenosis or regurgitation seen.  Structurally normal tricuspid valve.  Trace tricuspid regurgitation.      EKG performed on (08/24/14) EKG shows normal sinus rhythm.  EKG performed on (07/30/14) EKG shows normal sinus rhythm.  EKG performed on (12/06/12) EKG shows normal sinus rhythm.  EKG performed on (12/05/12) EKG shows atrial fibrillation.     Laboratory results of (03/27/23) were reviewed. Cholesterol profile of 144/90/63/63 mg/dL noted.  Laboratory results of (09/14/20) Cholesterol profile of 183/136/60/96 mg/dl noted.  Laboratory results of (05/30/19) Cholesterol profile of 179/132/56/97 noted.  Laboratory results of (06/12/17) Cholesterol profile of 169/137/54/88 noted.  Laboratory results of (09/14/16) Cholesterol profile of 151/103/55/75 noted.  Laboratory results of (06/01/15) Cholesterol profile of 190/150/54/106 noted.     MYOCARDIAL PERFUSION STUDY CONCLUSIONS (06/18/18)  No evidence of significant jeopardized viable myocardium or  prior myocardial infarction.  Normal left ventricular size, ejection fraction, and wall motion.  ECG INTERPRETATION  Negative stress ECG for ischemia.     MPI CONCLUSIONS (06/07/06)  NORMAL MYOCARDIAL PERFUSION IMAGES WITH NO EVIDENCE OF SIGNIFICANT   ISCHEMIA OR INFARCTION.    Assessment & Plan     1. Essential (primary) hypertension        2. Pure hypercholesterolemia        3. Bilateral carotid artery stenosis        4. Paroxysmal atrial fibrillation (HCC)        5. Chronic anticoagulation            Medical Decision Making: Today's Assessment/Status/Plan:        Hypertension: Blood pressure is well controlled. We will continue with metoprolol, lisinopril.  Hyperlipidemia: She is doing well on statin therapy without myalgia symptoms. (Managed by primary care physician)  Carotid artery stenosis: Clinically stable on above medical therapy.  Atrial fibrillation on anticoagulation therapy (Pradaxa): She is doing well on anticoagulation.  (Managed by Jose Garcia)  History of trans-ischemic attack (2000): She remains clinically stable without any new neurological symptoms.  Health maintenance: She will rediscuss surveillance for pulmonary nodules with her primary care physician. She underwent surgery for uterine cancer (08/27/14).    We will follow up the patient in one year.    CC Temitope Mccullough

## 2023-06-06 ENCOUNTER — HOSPITAL ENCOUNTER (OUTPATIENT)
Dept: RADIOLOGY | Facility: MEDICAL CENTER | Age: 73
End: 2023-06-06
Attending: INTERNAL MEDICINE
Payer: MEDICARE

## 2023-06-06 DIAGNOSIS — Z12.31 VISIT FOR SCREENING MAMMOGRAM: ICD-10-CM

## 2023-06-06 PROCEDURE — 77063 BREAST TOMOSYNTHESIS BI: CPT

## 2023-07-06 ENCOUNTER — OFFICE VISIT (OUTPATIENT)
Dept: MEDICAL GROUP | Facility: PHYSICIAN GROUP | Age: 73
End: 2023-07-06
Payer: MEDICARE

## 2023-07-06 VITALS
SYSTOLIC BLOOD PRESSURE: 136 MMHG | RESPIRATION RATE: 16 BRPM | BODY MASS INDEX: 30.21 KG/M2 | DIASTOLIC BLOOD PRESSURE: 64 MMHG | TEMPERATURE: 96.6 F | WEIGHT: 170.5 LBS | HEART RATE: 59 BPM | OXYGEN SATURATION: 97 % | HEIGHT: 63 IN

## 2023-07-06 DIAGNOSIS — E11.9 TYPE 2 DIABETES MELLITUS WITHOUT COMPLICATION, WITHOUT LONG-TERM CURRENT USE OF INSULIN (HCC): ICD-10-CM

## 2023-07-06 DIAGNOSIS — Z78.9 POLST (PHYSICIAN ORDERS FOR LIFE-SUSTAINING TREATMENT): ICD-10-CM

## 2023-07-06 DIAGNOSIS — Z00.00 ENCOUNTER FOR SUBSEQUENT ANNUAL WELLNESS VISIT (AWV) IN MEDICARE PATIENT: Primary | ICD-10-CM

## 2023-07-06 DIAGNOSIS — Z63.4 BEREAVEMENT: ICD-10-CM

## 2023-07-06 DIAGNOSIS — M85.852 OSTEOPENIA OF LEFT HIP: ICD-10-CM

## 2023-07-06 PROCEDURE — 3078F DIAST BP <80 MM HG: CPT | Performed by: INTERNAL MEDICINE

## 2023-07-06 PROCEDURE — 3075F SYST BP GE 130 - 139MM HG: CPT | Performed by: INTERNAL MEDICINE

## 2023-07-06 PROCEDURE — G0439 PPPS, SUBSEQ VISIT: HCPCS | Performed by: INTERNAL MEDICINE

## 2023-07-06 SDOH — SOCIAL STABILITY - SOCIAL INSECURITY: DISSAPEARANCE AND DEATH OF FAMILY MEMBER: Z63.4

## 2023-07-06 ASSESSMENT — ACTIVITIES OF DAILY LIVING (ADL): BATHING_REQUIRES_ASSISTANCE: 0

## 2023-07-06 ASSESSMENT — PATIENT HEALTH QUESTIONNAIRE - PHQ9: CLINICAL INTERPRETATION OF PHQ2 SCORE: 0

## 2023-07-06 ASSESSMENT — FIBROSIS 4 INDEX: FIB4 SCORE: 1.22

## 2023-07-06 ASSESSMENT — ENCOUNTER SYMPTOMS: GENERAL WELL-BEING: GOOD

## 2023-07-06 NOTE — PROGRESS NOTES
Chief Complaint   Patient presents with    Annual Exam     Annual   LOV 23  Labs 3/27/23  Shingles Monofilament and Bone Density due     passed away  and has other deaths from March to May this last couple of months   got a kitten Squeakers   Refills on lancets and strips        HPI:  Cristela is a 72 y.o. here for Medicare Annual Wellness Visit    Problem   Bereavement    She lost her  this spring, his name was Art.  They were  for 51.5 years.  She also has several other friends and family members who  around the same time so these past 6 months have been more of a struggle.  She is in good spirits today.     POLST- DNR/DNI    POLST completed in clinic 2023, DNR/DNI. Daughter Nikki, and eugene Posey are her medical power of attorneys. Recently lost her spouse in Spring 2023 so we updated her POLST as she now wants to be DNR/DNI.     Osteopenia of left hip with elevated FRAX    Bone Density (2020):  distal left forearm T score of -0.8  proximal left femur T score of -1.7 (-1.1 in )     When compared with the most recent study dated 2016, there has been a 9.0% decrease in the bone mineral density of the left femur.    10-year Probability of Fracture:  Major Osteoporotic     31.8%  Hip     12.4%    She has a history of osteopenia on prior bone density.  However, reviewing her FRAX she actually met criteria for initiation of antiresorptive therapy.  Patient understands that she meets the treatment threshold based on her FRAX but would like to hold off on starting pharmacotherapy at this time and would rather follow up DEXA in .  She denies any history of fragility fractures (fractures of wrists in 1970's and hip dislocation without fracture in ).  She is taking calcium 1200 mg daily and vitamin D 2,000 IU daily.      Type 2 Diabetes Mellitus Without Complication, Without Long-Term Current Use of Insulin ([facility])     Latest Reference Range & Units 23  12:00   Glycohemoglobin 0.0 - 5.6 % 6.4 !     She technically met criteria for type 2 diabetes as of May 2019.  She has never taken any glucose lowering medications.  She is on statin therapy for history of elevated cholesterol and TIA and she is also on ACE inhibitor for history of hypertension.  No known history of retinopathy or nephropathy. She has mild chronic neuropathy likely from a nondiabetic source.          Patient Active Problem List    Diagnosis Date Noted    Bereavement 07/06/2023    Chronic anticoagulation 04/07/2023    POLST- DNR/DNI 01/05/2023    Osteopenia of left hip with elevated FRAX 03/23/2020    Diastolic dysfunction 03/23/2020    Type 2 diabetes mellitus without complication, without long-term current use of insulin (HCC) 03/23/2020    Obesity (BMI 30-39.9) 03/23/2020    Carotid artery stenosis 12/14/2018    Macular degeneration 05/10/2016    Hernia of abdominal wall 05/10/2016    Family history of cancer 05/10/2016    Vitamin D deficiency 10/28/2015    Macular degeneration, bilateral 10/28/2015    History of endometrial cancer 08/28/2014    Allergic rhinitis 05/13/2014    Hyperlipidemia 05/13/2014    Unspecified contact dermatitis, unspecified cause 05/13/2014    Paroxysmal atrial fibrillation (HCC) 12/06/2012    Hypercoagulable state due to paroxysmal atrial fibrillation (HCC) 12/06/2012    Essential (primary) hypertension 12/06/2012    Transient ischemic attack 12/06/2012       Current Outpatient Medications   Medication Sig Dispense Refill    dabigatran (PRADAXA) 150 MG Cap capsule TAKE ONE CAPSULE BY MOUTH TWICE DAILY 180 Capsule 1    lisinopril (PRINIVIL) 5 MG Tab Take 1 Tablet by mouth every day. 100 Tablet 3    metoprolol tartrate (LOPRESSOR) 25 MG Tab TAKE 1 TABLET BY MOUTH TWICE A  Tablet 3    atorvastatin (LIPITOR) 40 MG Tab Take 1 Tablet by mouth every evening. 100 Tablet 3    glucose blood strip 1 Strip by Other route as needed. 100 Strip 3    Lancets Lancets order:  Lancets for Abbott Freestyle Lite meter. Sig: use once daily and prn ssx high or low sugar. #100 RF x 3 100 Each 3    Multiple Vitamins-Minerals (PRESERVISION AREDS PO) Take  by mouth.      Cholecalciferol (VITAMIN D HIGH POTENCY PO) Take 2,000 mg by mouth every day.      Polyethylene Glycol 400 (BLINK TEARS OP) by Ophthalmic route every day.      Calcium 600 MG TABS Take 1 Tab by mouth every morning. Taking 1000mg  Indications: per pt is taking 500 mg      multivitamin (THERAGRAN) TABS Take 1 Tablet by mouth every morning.        Polyethyl Glycol-Propyl Glycol (SYSTANE OP) Place 1 Drop in both eyes every bedtime. Indications: **OTC**       No current facility-administered medications for this visit.        Patient is taking medications as noted in medication list.  Current supplements as per medication list.     Allergies: Fentanyl; Flu virus vaccine; Versed; Bee; Codeine; Eggs; Lidocaine hcl; Other drug; Penicillins; Soap; Tape; and Xylocaine [lidocaine hcl, local anesth.]    Current social contact/activities: several losses in recent months. New kitten.    Is patient current with immunizations? No, due for COVID and SHINGRIX (Shingles). Patient is interested in receiving NONE today.    She  reports that she has never smoked. She has never used smokeless tobacco. She reports that she does not drink alcohol and does not use drugs.  Counseling given: Not Answered  Tobacco comments: continued abstinence      ROS:    Gait: Uses no assistive device   Ostomy: No   Other tubes: No   Amputations: No   Chronic oxygen use No   Last eye exam Jan 2023  Dr. [facility]   Wears hearing aids: No   : Denies any urinary leakage during the last 6 months    Screening:    Depression Screening  Little interest or pleasure in doing things?  0 - not at all  Feeling down, depressed, or hopeless? 0 - not at all  Trouble falling or staying asleep, or sleeping too much?     Feeling tired or having little energy?     Poor  appetite or overeating?     Feeling bad about yourself - or that you are a failure or have let yourself or your family down?    Trouble concentrating on things, such as reading the newspaper or watching television?    Moving or speaking so slowly that other people could have noticed.  Or the opposite - being so fidgety or restless that you have been moving around a lot more than usual?     Thoughts that you would be better off dead, or of hurting yourself?     Patient Health Questionnaire Score:      If depressive symptoms identified deferred to follow up visit unless specifically addressed in assessment and plan.    Interpretation of PHQ-9 Total Score   Score Severity   1-4 No Depression   5-9 Mild Depression   10-14 Moderate Depression   15-19 Moderately Severe Depression   20-27 Severe Depression    Screening for Cognitive Impairment  Three Minute Recall (Banana, Sunrise, Chair)  3/3    Draw clock face with all 12 numbers and set the hands to show 20 past 8.  Yes    If cognitive concerns identified, deferred for follow up unless specifically addressed in assessment and plan.    Fall Risk Assessment  Has the patient had two or more falls in the last year or any fall with injury in the last year?  No  If fall risk identified, deferred for follow up unless specifically addressed in assessment and plan.    Safety Assessment  Throw rugs on floor.  No  Handrails on all stairs.  Yes  Good lighting in all hallways.  Yes  Difficulty hearing.  No  Patient counseled about all safety risks that were identified.    Functional Assessment ADLs  Are there any barriers preventing you from cooking for yourself or meeting nutritional needs?  No.    Are there any barriers preventing you from driving safely or obtaining transportation?  No.    Are there any barriers preventing you from using a telephone or calling for help?  No.    Are there any barriers preventing you from shopping?  No.    Are there any barriers preventing you from  taking care of your own finances?  No.    Are there any barriers preventing you from managing your medications?  No.    Are there any barriers preventing you from showering, bathing or dressing yourself?  No.    Are you currently engaging in any exercise or physical activity?  Yes.  Gardening set of exercises three different times a day   What is your perception of your health?  Good.    Advance Care Planning  Do you have an Advance Directive, Living Will, Durable Power of , or POLST? Yes  Advance Directive            Health Maintenance Summary            Overdue - Annual Wellness Visit (Every 366 Days) Overdue since 10/30/2019      10/29/2018  Visit Dx: Encounter for Medicare annual wellness exam              Ordered - BONE DENSITY (Every 2 Years) Ordered on 7/6/2023 06/29/2020  DS-BONE DENSITY STUDY (DEXA)    05/26/2016  DS-BONE DENSITY STUDY (DEXA)    12/04/2009  DS-BONE DENSITY STUDY (DEXA)              Postponed - IMM ZOSTER VACCINES (1 of 2) Postponed until 12/6/2023      No completion history exists for this topic.              Postponed - COVID-19 Vaccine (3 - Pfizer series) Postponed until 1/5/2024 04/16/2021  Imm Admin: PFIZER PURPLE CAP SARS-COV-2 VACCINATION (12+)    03/24/2021  Imm Admin: PFIZER PURPLE CAP SARS-COV-2 VACCINATION (12+)              IMM INFLUENZA (1) Next due on 9/1/2023      No completion history exists for this topic.              Ordered - URINE ACR / MICROALBUMIN (Yearly) Ordered on 7/6/2023 09/09/2022  MICROALBUMIN CREAT RATIO URINE    03/11/2022  MICROALBUMIN CREAT RATIO URINE    10/01/2021  MICROALBUMIN CREAT RATIO URINE    09/14/2020  MICROALBUMIN CREAT RATIO URINE    06/12/2017  MICROALBUMIN CREAT RATIO URINE    Only the first 5 history entries have been loaded, but more history exists.              Ordered - A1C SCREENING (Every 6 Months) Ordered on 7/6/2023 03/27/2023  HEMOGLOBIN A1C    01/05/2023  POCT  A1C    09/09/2022  HEMOGLOBIN A1C     06/13/2022  POCT A1C    03/11/2022  HEMOGLOBIN A1C    Only the first 5 history entries have been loaded, but more history exists.              RETINAL SCREENING (Yearly) Next due on 12/1/2023 12/01/2022  RETINAL SCREENING RESULTS    08/22/2022  RETINAL SCREENING RESULTS    08/04/2022  RETINAL SCREENING RESULTS    05/17/2021  REFERRAL FOR RETINAL SCREENING EXAM    05/17/2021  RETINAL SCREENING RESULTS    Only the first 5 history entries have been loaded, but more history exists.              Ordered - FASTING LIPID PROFILE (Yearly) Ordered on 7/6/2023 03/27/2023  Lipid Profile    09/09/2022  Lipid Profile    03/11/2022  Lipid Profile    09/03/2021  Lipid Profile    03/18/2021  Lipid Profile    Only the first 5 history entries have been loaded, but more history exists.              Ordered - SERUM CREATININE (Yearly) Ordered on 7/6/2023 03/27/2023  Comp Metabolic Panel    09/09/2022  Comp Metabolic Panel    03/11/2022  Comp Metabolic Panel    09/03/2021  Comp Metabolic Panel    03/18/2021  Comp Metabolic Panel    Only the first 5 history entries have been loaded, but more history exists.              DIABETES MONOFILAMENT / LE EXAM (Yearly) Next due on 7/6/2024 07/06/2023  Diabetic Monofilament Lower Extremity Exam    03/21/2022  Diabetic Monofilament Lower Extremity Exam    03/22/2021  Diabetic Monofilament Lower Extremity Exam              COLORECTAL CANCER SCREENING (COLONOSCOPY - Every 10 Years) Next due on 3/4/2025      03/04/2015  COLONOSCOPY (Done)    03/04/2015  REFERRAL TO GI FOR COLONOSCOPY              MAMMOGRAM (Every 2 Years) Next due on 6/6/2025 06/06/2023  MA-SCREENING MAMMO BILAT W/TOMOSYNTHESIS W/CAD    01/13/2022  MA-SCREENING MAMMO BILAT W/TOMOSYNTHESIS W/CAD    11/21/2020  MA-SCREENING MAMMO BILAT W/TOMOSYNTHESIS W/CAD    11/15/2019  MA-SCREENING MAMMO BILAT W/TOMOSYNTHESIS W/CAD    10/09/2018  MA-SCREENING MAMMO BILAT W/TOMOSYNTHESIS W/CAD    Only the first 5 history  entries have been loaded, but more history exists.              IMM DTaP/Tdap/Td Vaccine (2 - Td or Tdap) Next due on 7/28/2027 07/28/2017  Imm Admin: Tdap Vaccine              IMM PNEUMOCOCCAL VACCINE: 65+ Years (Series Information) Completed      11/09/2016  Imm Admin: Pneumococcal Conjugate Vaccine (Prevnar/PCV-13)    05/19/2015  Imm Admin: Pneumococcal polysaccharide vaccine (PPSV-23)              HEPATITIS C SCREENING  Completed      06/12/2017  Hepatitis C Antibody component of HEP C VIRUS ANTIBODY    02/23/2013  Hepatitis C Antibody component of HEP C VIRUS ANTIBODY              HPV Vaccines (Series Information) Aged Out      No completion history exists for this topic.              IMM MENINGOCOCCAL ACWY VACCINE (Series Information) Aged Out      No completion history exists for this topic.              Discontinued - CERVICAL CANCER SCREENING  Discontinued        Frequency changed to Never automatically (Topic No Longer Applies)    09/13/2022  PAP ONLY    09/14/2021  PAP ONLY    01/07/2019  THINPREP PAP WITH HPV    01/07/2019  PATHOLOGY GYN SPECIMEN    Only the first 5 history entries have been loaded, but more history exists.              Discontinued - IMM HEP B VACCINE  Discontinued      No completion history exists for this topic.                    Patient Care Team:  Temitope Bess D.O. as PCP - General (Internal Medicine)  Temitope Bess D.O. as PCP - [facility] Paneled  Demetris Avery M.D. as Consulting Physician (Cardiovascular Disease (Cardiology))  Pantera Wylie M.D. as Consulting Physician (Gynecologic Oncology)  Nico De Jesus M.D. as Consulting Physician (Ophthalmology)  Sil Singh [facility]     Social History     Tobacco Use    Smoking status: Never    Smokeless tobacco: Never    Tobacco comments:     continued abstinence   Vaping Use    Vaping Use: Never used   Substance Use Topics    Alcohol use: No    Drug use: No     Family History  "  Problem Relation Age of Onset    Hyperlipidemia Mother     Hypertension Mother     Cancer Mother     No Known Problems Father     Heart Disease Sister     Alcohol/Drug Sister     Hypertension Sister     Heart Attack Sister     Cancer Sister 66        ? breast cancer as well    Hypertension Sister     Hyperlipidemia Sister     Cancer Other     Hypertension Other     Diabetes Maternal Grandmother         elderly    Other Sister         suicide     Stroke Neg Hx      She  has a past medical history of Allergic rhinitis, Anesthesia, Atrial fibrillation (HCC), Cancer (HCC) (2014), CATARACT, Cholesterol blood decreased, Dental disorder, Dermatitis, contact, Dyslipidemia (6/30/2016), Elevated alkaline phosphatase level (9/20/2021), Encounter for general adult medical examination without abnormal findings (12/2/2014), Endometrium cancer (HCC), Hyperlipidemia, Hypertension, Impaired glucose tolerance, Macular degeneration, Obesity, Pneumonia, Prolapsed uterus (8/2014), Pulmonary nodule (11/9/2016), Stroke (HCC), TIA (transient ischemic attack), Unspecified hemorrhagic conditions, and Vitamin D deficiency.   Past Surgical History:   Procedure Laterality Date    HYSTERECTOMY ROBOTIC XI  8/28/2014    Performed by Pantera Wylie M.D. at SURGERY [facility]S    LYMPH NODE DISSECTION ROBOTIC XI  8/28/2014    Performed by Pantera Wylie M.D. at SURGERY [facility]    CATARACT PHACO WITH IOL  8/19/2010    Performed by DAVE AGUILAR at SURGERY SAME DAY [facility] ORS    CATARACT PHACO WITH IOL  8/5/2010    Performed by DAVE AGUILAR at SURGERY SAME DAY [facility] ORS    HIP ARTHROSCOPY  5/12/08    Performed by ZINA HOLLIS at SURGERY [facility] ORS    ACETABULAR OSTEOTOMY  5/12/08    Performed by ZINA HOLLIS at SURGERY [facility] ORS    ORTHOPEDIC OSTEOTOMY  5/12/08    Performed by ZINA HOLLIS at SURGERY [facility] ORS    NASAL POLYPECTOMY  1994    \"removal of papilloma\"    ABDOMINAL HYSTERECTOMY TOTAL      " "EYE SURGERY         Exam:   /64 (BP Location: Right arm, Patient Position: Sitting, BP Cuff Size: Adult)   Pulse (!) 59   Temp 35.9 °C (96.6 °F) (Temporal)   Resp 16   Ht 1.6 m (5' 3\")   Wt 77.3 kg (170 lb 8 oz)   SpO2 97%  Body mass index is 30.2 kg/m².    Hearing good.    Dentition good  Alert, oriented in no acute distress  Eye contact is good, speech goal directed, affect calm      Assessment and Plan. The following treatment and monitoring plan is recommended:      Problem List Items Addressed This Visit       Bereavement     New problem, fortunately she has lost several people close to her in recent time including her spouse.  Provided active listening, she appears well-groomed and calm, likely has not had much time to process yet because of the back to back events in recent time.  We will check in with her again in a few months to make sure her mood is doing well.         Osteopenia of left hip with elevated FRAX     Chronic ongoing problem, due for repeat bone density, will give order today and can review at our next follow-up visit.         Relevant Orders    DS-BONE DENSITY STUDY (DEXA)    POLST- DNR/DNI     Chronic ongoing problem, recently lost her spouse and several other close friends and family members.  She like to change her CODE STATUS from full code to DNR/DNI.  POLST form was updated in clinic today and will be scanned into the chart.  Now her daughter Nikki and eugene Psoey are her primary and secondary medical buenrostro of .  Her spouse Art passed so he is no longer healthcare contact.         Type 2 diabetes mellitus without complication, without long-term current use of insulin (HCC)     Chronic stable problem.  She is appropriately on ACEI and statin.  We will update her lab work before our next visit.  Next retinal exam will be due in December 2023.  Next urine microalbumin will be due as of September 2023, order given today.  Continue with observation, no indication at " pharmacotherapy at this time.         Relevant Orders    Diabetic Monofilament Lower Extremity Exam (Completed)    FREE THYROXINE    TSH    VITAMIN B12    VITAMIN D,25 HYDROXY (DEFICIENCY)    Lipid Profile    Comp Metabolic Panel    CBC WITH DIFFERENTIAL    MICROALBUMIN CREAT RATIO URINE    HEMOGLOBIN A1C     Other Visit Diagnoses       Encounter for subsequent annual wellness visit (AWV) in Medicare patient    -  Primary              Services suggested: No services needed at this time  Health Care Screening recommendations as per orders if indicated.  Referrals offered: PT/OT/Nutrition counseling/Behavioral Health/Smoking cessation as per orders if indicated.    Discussion today about general wellness and lifestyle habits:    Prevent falls and reduce trip hazards; Cautioned about securing or removing rugs.  Have a working fire alarm and carbon monoxide detector;   Engage in regular physical activity and social activities.     Follow-up: Return in about 4 months (around 11/6/2023).       I spent a total of 32 minutes with record review, exam, communication with the patient, communication with other providers, and documentation of this encounter.       PLEASE NOTE: This dictation was created using voice recognition software. I have made every reasonable attempt to correct obvious errors, but I expect that there are errors of grammar and possibly content that I did not discover before finalizing the note.      Temitope Bess, DO  Geriatric and Internal Medicine  [facility]

## 2023-07-06 NOTE — ASSESSMENT & PLAN NOTE
Chronic stable problem.  She is appropriately on ACEI and statin.  We will update her lab work before our next visit.  Next retinal exam will be due in December 2023.  Next urine microalbumin will be due as of September 2023, order given today.  Continue with observation, no indication at pharmacotherapy at this time.

## 2023-07-06 NOTE — ASSESSMENT & PLAN NOTE
Chronic ongoing problem, due for repeat bone density, will give order today and can review at our next follow-up visit.

## 2023-07-06 NOTE — ASSESSMENT & PLAN NOTE
Chronic ongoing problem, recently lost her spouse and several other close friends and family members.  She like to change her CODE STATUS from full code to DNR/DNI.  POLST form was updated in clinic today and will be scanned into the chart.  Now her daughter Nikki and eugene Posey are her primary and secondary medical buenrostro of .  Her spouse Art passed so he is no longer healthcare contact.

## 2023-07-06 NOTE — ASSESSMENT & PLAN NOTE
New problem, fortunately she has lost several people close to her in recent time including her spouse.  Provided active listening, she appears well-groomed and calm, likely has not had much time to process yet because of the back to back events in recent time.  We will check in with her again in a few months to make sure her mood is doing well.

## 2023-07-30 DIAGNOSIS — E11.9 TYPE 2 DIABETES MELLITUS WITHOUT COMPLICATION, WITHOUT LONG-TERM CURRENT USE OF INSULIN (HCC): ICD-10-CM

## 2023-07-31 RX ORDER — LANCETS 30 GAUGE
EACH MISCELLANEOUS
Qty: 100 EACH | Refills: 3 | Status: SHIPPED | OUTPATIENT
Start: 2023-07-31

## 2023-08-17 ENCOUNTER — HOSPITAL ENCOUNTER (OUTPATIENT)
Dept: RADIOLOGY | Facility: MEDICAL CENTER | Age: 73
End: 2023-08-17
Attending: INTERNAL MEDICINE
Payer: MEDICARE

## 2023-08-17 DIAGNOSIS — M85.852 OSTEOPENIA OF LEFT HIP: ICD-10-CM

## 2023-08-17 PROCEDURE — 77080 DXA BONE DENSITY AXIAL: CPT

## 2023-09-25 ENCOUNTER — HOSPITAL ENCOUNTER (OUTPATIENT)
Dept: LAB | Facility: MEDICAL CENTER | Age: 73
End: 2023-09-25
Attending: INTERNAL MEDICINE
Payer: MEDICARE

## 2023-09-25 ENCOUNTER — HOSPITAL ENCOUNTER (OUTPATIENT)
Dept: RADIOLOGY | Facility: MEDICAL CENTER | Age: 73
End: 2023-09-25
Attending: INTERNAL MEDICINE
Payer: MEDICARE

## 2023-09-25 DIAGNOSIS — E11.9 TYPE 2 DIABETES MELLITUS WITHOUT COMPLICATION, WITHOUT LONG-TERM CURRENT USE OF INSULIN (HCC): ICD-10-CM

## 2023-09-25 DIAGNOSIS — C54.9 MALIGNANT NEOPLASM OF CORPUS UTERI, EXCEPT ISTHMUS (HCC): ICD-10-CM

## 2023-09-25 DIAGNOSIS — C54.1 MALIGNANT NEOPLASM OF ENDOMETRIUM (HCC): ICD-10-CM

## 2023-09-25 LAB
25(OH)D3 SERPL-MCNC: 57 NG/ML (ref 30–100)
ALBUMIN SERPL BCP-MCNC: 4 G/DL (ref 3.2–4.9)
ALBUMIN/GLOB SERPL: 1.7 G/DL
ALP SERPL-CCNC: 89 U/L (ref 30–99)
ALT SERPL-CCNC: 15 U/L (ref 2–50)
ANION GAP SERPL CALC-SCNC: 8 MMOL/L (ref 7–16)
AST SERPL-CCNC: 17 U/L (ref 12–45)
BASOPHILS # BLD AUTO: 1.2 % (ref 0–1.8)
BASOPHILS # BLD: 0.06 K/UL (ref 0–0.12)
BILIRUB SERPL-MCNC: 0.9 MG/DL (ref 0.1–1.5)
BUN SERPL-MCNC: 16 MG/DL (ref 8–22)
CALCIUM ALBUM COR SERPL-MCNC: 9.3 MG/DL (ref 8.5–10.5)
CALCIUM SERPL-MCNC: 9.3 MG/DL (ref 8.5–10.5)
CHLORIDE SERPL-SCNC: 105 MMOL/L (ref 96–112)
CHOLEST SERPL-MCNC: 146 MG/DL (ref 100–199)
CO2 SERPL-SCNC: 27 MMOL/L (ref 20–33)
CREAT SERPL-MCNC: 0.85 MG/DL (ref 0.5–1.4)
CREAT UR-MCNC: 56.8 MG/DL
EOSINOPHIL # BLD AUTO: 0.13 K/UL (ref 0–0.51)
EOSINOPHIL NFR BLD: 2.6 % (ref 0–6.9)
ERYTHROCYTE [DISTWIDTH] IN BLOOD BY AUTOMATED COUNT: 49.1 FL (ref 35.9–50)
EST. AVERAGE GLUCOSE BLD GHB EST-MCNC: 131 MG/DL
GFR SERPLBLD CREATININE-BSD FMLA CKD-EPI: 71 ML/MIN/1.73 M 2
GLOBULIN SER CALC-MCNC: 2.4 G/DL (ref 1.9–3.5)
GLUCOSE SERPL-MCNC: 130 MG/DL (ref 65–99)
HBA1C MFR BLD: 6.2 % (ref 4–5.6)
HCT VFR BLD AUTO: 44.1 % (ref 37–47)
HDLC SERPL-MCNC: 61 MG/DL
HGB BLD-MCNC: 13.9 G/DL (ref 12–16)
IMM GRANULOCYTES # BLD AUTO: 0.01 K/UL (ref 0–0.11)
IMM GRANULOCYTES NFR BLD AUTO: 0.2 % (ref 0–0.9)
LDLC SERPL CALC-MCNC: 68 MG/DL
LYMPHOCYTES # BLD AUTO: 1 K/UL (ref 1–4.8)
LYMPHOCYTES NFR BLD: 20.3 % (ref 22–41)
MCH RBC QN AUTO: 29.7 PG (ref 27–33)
MCHC RBC AUTO-ENTMCNC: 31.5 G/DL (ref 32.2–35.5)
MCV RBC AUTO: 94.2 FL (ref 81.4–97.8)
MICROALBUMIN UR-MCNC: <1.2 MG/DL
MICROALBUMIN/CREAT UR: NORMAL MG/G (ref 0–30)
MONOCYTES # BLD AUTO: 0.57 K/UL (ref 0–0.85)
MONOCYTES NFR BLD AUTO: 11.6 % (ref 0–13.4)
NEUTROPHILS # BLD AUTO: 3.15 K/UL (ref 1.82–7.42)
NEUTROPHILS NFR BLD: 64.1 % (ref 44–72)
NRBC # BLD AUTO: 0 K/UL
NRBC BLD-RTO: 0 /100 WBC (ref 0–0.2)
PLATELET # BLD AUTO: 287 K/UL (ref 164–446)
PMV BLD AUTO: 10 FL (ref 9–12.9)
POTASSIUM SERPL-SCNC: 5 MMOL/L (ref 3.6–5.5)
PROT SERPL-MCNC: 6.4 G/DL (ref 6–8.2)
RBC # BLD AUTO: 4.68 M/UL (ref 4.2–5.4)
SODIUM SERPL-SCNC: 140 MMOL/L (ref 135–145)
T4 FREE SERPL-MCNC: 1.13 NG/DL (ref 0.93–1.7)
TRIGL SERPL-MCNC: 87 MG/DL (ref 0–149)
TSH SERPL DL<=0.005 MIU/L-ACNC: 1.41 UIU/ML (ref 0.38–5.33)
VIT B12 SERPL-MCNC: 725 PG/ML (ref 211–911)
WBC # BLD AUTO: 4.9 K/UL (ref 4.8–10.8)

## 2023-09-25 PROCEDURE — 84439 ASSAY OF FREE THYROXINE: CPT

## 2023-09-25 PROCEDURE — 71046 X-RAY EXAM CHEST 2 VIEWS: CPT

## 2023-09-25 PROCEDURE — 82607 VITAMIN B-12: CPT

## 2023-09-25 PROCEDURE — 36415 COLL VENOUS BLD VENIPUNCTURE: CPT

## 2023-09-25 PROCEDURE — 82570 ASSAY OF URINE CREATININE: CPT

## 2023-09-25 PROCEDURE — 80053 COMPREHEN METABOLIC PANEL: CPT

## 2023-09-25 PROCEDURE — 82306 VITAMIN D 25 HYDROXY: CPT

## 2023-09-25 PROCEDURE — 80061 LIPID PANEL: CPT

## 2023-09-25 PROCEDURE — 83036 HEMOGLOBIN GLYCOSYLATED A1C: CPT

## 2023-09-25 PROCEDURE — 82043 UR ALBUMIN QUANTITATIVE: CPT

## 2023-09-25 PROCEDURE — 84443 ASSAY THYROID STIM HORMONE: CPT

## 2023-09-25 PROCEDURE — 85025 COMPLETE CBC W/AUTO DIFF WBC: CPT

## 2023-10-19 ENCOUNTER — HOSPITAL ENCOUNTER (OUTPATIENT)
Dept: RADIOLOGY | Facility: MEDICAL CENTER | Age: 73
End: 2023-10-19
Attending: STUDENT IN AN ORGANIZED HEALTH CARE EDUCATION/TRAINING PROGRAM
Payer: MEDICARE

## 2023-10-19 DIAGNOSIS — C54.9 MALIGNANT NEOPLASM OF CORPUS UTERI, UNSPECIFIED (HCC): ICD-10-CM

## 2023-10-19 DIAGNOSIS — C54.1 MALIGNANT NEOPLASM OF ENDOMETRIUM (HCC): ICD-10-CM

## 2023-10-19 PROCEDURE — 71260 CT THORAX DX C+: CPT

## 2023-10-19 PROCEDURE — 700117 HCHG RX CONTRAST REV CODE 255: Performed by: STUDENT IN AN ORGANIZED HEALTH CARE EDUCATION/TRAINING PROGRAM

## 2023-10-19 RX ADMIN — IOHEXOL 75 ML: 350 INJECTION, SOLUTION INTRAVENOUS at 13:31

## 2023-10-24 DIAGNOSIS — I48.91 ATRIAL FIBRILLATION, UNSPECIFIED TYPE (HCC): ICD-10-CM

## 2023-10-25 RX ORDER — DABIGATRAN ETEXILATE 150 MG/1
CAPSULE ORAL
Qty: 200 CAPSULE | Refills: 1 | Status: SHIPPED | OUTPATIENT
Start: 2023-10-25 | End: 2024-01-24 | Stop reason: SDUPTHER

## 2023-11-06 ENCOUNTER — OFFICE VISIT (OUTPATIENT)
Dept: MEDICAL GROUP | Facility: PHYSICIAN GROUP | Age: 73
End: 2023-11-06
Payer: MEDICARE

## 2023-11-06 VITALS
BODY MASS INDEX: 30.16 KG/M2 | HEIGHT: 63 IN | RESPIRATION RATE: 12 BRPM | OXYGEN SATURATION: 98 % | TEMPERATURE: 96.7 F | HEART RATE: 65 BPM | WEIGHT: 170.2 LBS | DIASTOLIC BLOOD PRESSURE: 70 MMHG | SYSTOLIC BLOOD PRESSURE: 138 MMHG

## 2023-11-06 DIAGNOSIS — E11.9 TYPE 2 DIABETES MELLITUS WITHOUT COMPLICATION, WITHOUT LONG-TERM CURRENT USE OF INSULIN (HCC): ICD-10-CM

## 2023-11-06 DIAGNOSIS — M85.852 OSTEOPENIA OF LEFT HIP: ICD-10-CM

## 2023-11-06 DIAGNOSIS — R91.8 LUNG MASS: ICD-10-CM

## 2023-11-06 DIAGNOSIS — E55.9 VITAMIN D DEFICIENCY: ICD-10-CM

## 2023-11-06 DIAGNOSIS — E78.00 PURE HYPERCHOLESTEROLEMIA: ICD-10-CM

## 2023-11-06 PROBLEM — Z79.01 CHRONIC ANTICOAGULATION: Status: RESOLVED | Noted: 2023-04-07 | Resolved: 2023-11-06

## 2023-11-06 LAB — RETINAL SCREEN: NEGATIVE

## 2023-11-06 PROCEDURE — 3078F DIAST BP <80 MM HG: CPT | Performed by: INTERNAL MEDICINE

## 2023-11-06 PROCEDURE — 99214 OFFICE O/P EST MOD 30 MIN: CPT | Performed by: INTERNAL MEDICINE

## 2023-11-06 PROCEDURE — 92250 FUNDUS PHOTOGRAPHY W/I&R: CPT | Mod: TC | Performed by: INTERNAL MEDICINE

## 2023-11-06 PROCEDURE — 3075F SYST BP GE 130 - 139MM HG: CPT | Performed by: INTERNAL MEDICINE

## 2023-11-06 ASSESSMENT — FIBROSIS 4 INDEX: FIB4 SCORE: 1.15

## 2023-11-06 NOTE — ASSESSMENT & PLAN NOTE
Chronic improved problem, A1c back down in the prediabetic range, not currently on any pharmacotherapy.  She is appropriately on statin and ACEI.  Retinal exam updated in clinic today.  No urine microalbuminuria.  She has mild chronic neuropathy but this predates her diabetes diagnosis.  Continue with regular monitoring to ensure stability.  No indication to add more medicine at this time.

## 2023-11-06 NOTE — PROGRESS NOTES
Subjective:   Chief Complaint/History of Present Illness:  Cristela Alan is a 73 y.o. female established patient who presents today to discuss medical problems as listed below. Cristela is unaccompanied for today's visit.    Problem   Osteopenia of left hip with elevated FRAX    Bone Density (8/2023):  Lumbar spine T score of -0.1   proximal left femur T score of -1.6 (-1.7 in 2020, -1.1 in 2015)     When compared with the most recent study dated 6/29/2020, there has been a 2.8% increase in the bone mineral density of the proximal left femur and when compared with the study dated 5/26/2016 there has been a 6.1% decrease in the bone mineral density of   the lumbar spine.    She has a history of osteopenia on prior bone density.  However, reviewing her FRAX she actually met criteria for initiation of antiresorptive therapy.  Patient understands that she meets the treatment threshold based on her FRAX but would like to hold off on starting pharmacotherapy at this time and would rather follow up DEXA regularly.  She denies any history of fragility fractures (fractures of wrists in 1970's and hip dislocation without fracture in 2004).  She is taking calcium 1200 mg daily and vitamin D 2,000 IU daily.      Type 2 Diabetes Mellitus Without Complication, Without Long-Term Current Use of Insulin ([facility])     Latest Reference Range & Units 09/25/23 08:50   Glycohemoglobin 4.0 - 5.6 % 6.2 (H)   Estim. Avg Glu mg/dL 131     She technically met criteria for type 2 diabetes as of May 2019.  She has never taken any glucose lowering medications.  She is on statin therapy for history of elevated cholesterol and TIA and she is also on ACE inhibitor for history of hypertension.  No known history of retinopathy or nephropathy. She has mild chronic neuropathy likely from a nondiabetic source.     Lung nodules, enlarging    No history of tobacco use  CT 9/16, 12/16- no change-6/17- ? New nodule>>PET CT-10/17 neg     CT chest (2023): There is  diffuse slightly increased bilateral lung nodules since 2017. Largest nodule measures 13 mm and previously measured 9 mm. Therefore, suspicious for slow growing metastases. Benign nodules also possible.     Hyperlipidemia     Latest Reference Range & Units 09/25/23 08:50   Cholesterol,Tot 100 - 199 mg/dL 146   Triglycerides 0 - 149 mg/dL 87   HDL >=40 mg/dL 61   LDL <100 mg/dL 68     She has history of elevated cholesterol in the past.  She is taking statin which will be an ongoing recommendation due to history of mini strokes, atrial fibrillation, and impaired fasting glucose.  No myalgias or GI upset on this medication.    Current regimen: Atorvastatin 40 mg nightly     Chronic Anticoagulation (Resolved)        Current Medications:  Current Outpatient Medications Ordered in Epic   Medication Sig Dispense Refill    dabigatran (PRADAXA) 150 MG Cap capsule TAKE 1 CAPSULE BY MOUTH TWICE A DAY* Capsule 1    glucose blood strip 1 Strip by Other route every day. 100 Strip 3    Lancets Lancets order: Lancets for Abbott Freestyle Lite meter. Sig: use once daily and prn ssx high or low sugar. #100 RF x 3 100 Each 3    lisinopril (PRINIVIL) 5 MG Tab Take 1 Tablet by mouth every day. 100 Tablet 3    metoprolol tartrate (LOPRESSOR) 25 MG Tab TAKE 1 TABLET BY MOUTH TWICE A  Tablet 3    atorvastatin (LIPITOR) 40 MG Tab Take 1 Tablet by mouth every evening. 100 Tablet 3    Multiple Vitamins-Minerals (PRESERVISION AREDS PO) Take  by mouth.      Cholecalciferol (VITAMIN D HIGH POTENCY PO) Take 2,000 mg by mouth every day.      Polyethylene Glycol 400 (BLINK TEARS OP) by Ophthalmic route every day.      Calcium 600 MG TABS Take 1 Tab by mouth every morning. Taking 1000mg  Indications: per pt is taking 500 mg      multivitamin (THERAGRAN) TABS Take 1 Tablet by mouth every morning.        Polyethyl Glycol-Propyl Glycol (SYSTANE OP) Place 1 Drop in both eyes every bedtime. Indications: **OTC**       No current Epic-ordered  "facility-administered medications on file.          Objective:   Physical Exam:    Vitals: /70 (BP Location: Left arm, Patient Position: Sitting, BP Cuff Size: Adult)   Pulse 65   Temp 35.9 °C (96.7 °F) (Temporal)   Resp 12   Ht 1.6 m (5' 3\")   Wt 77.2 kg (170 lb 3.2 oz)   SpO2 98%    BMI: Body mass index is 30.15 kg/m².  Physical Exam  Constitutional:       General: She is not in acute distress.     Appearance: Normal appearance. She is not ill-appearing.   HENT:      Right Ear: Ear canal and external ear normal. There is no impacted cerumen.      Left Ear: Ear canal and external ear normal. There is no impacted cerumen.   Eyes:      General: No scleral icterus.     Conjunctiva/sclera: Conjunctivae normal.   Cardiovascular:      Rate and Rhythm: Normal rate and regular rhythm.      Pulses: Normal pulses.   Pulmonary:      Effort: Pulmonary effort is normal. No respiratory distress.      Breath sounds: No wheezing or rhonchi.   Abdominal:      General: Bowel sounds are normal.      Palpations: Abdomen is soft.   Musculoskeletal:      Right lower leg: No edema.      Left lower leg: No edema.   Lymphadenopathy:      Cervical: No cervical adenopathy.   Skin:     General: Skin is warm and dry.      Findings: No rash.   Psychiatric:         Mood and Affect: Mood normal.         Behavior: Behavior normal.         Thought Content: Thought content normal.         Judgment: Judgment normal.               Assessment and Plan:   Cristela is a 73 y.o. female with the following:  Problem List Items Addressed This Visit       Hyperlipidemia     Chronic ongoing problem, lipids are well controlled on current medical therapy, continue atorvastatin 40 mg daily.         Relevant Orders    FREE THYROXINE    TSH    VITAMIN D,25 HYDROXY (DEFICIENCY)    Lipid Profile    Lung nodules, enlarging     Chronic worsening problem.  She had repeat chest x-ray per gynecologic oncologic surgery, this showed enlarging nodule so she was " recommended to have CT chest.  There is a large 13 mm nodule which previously measured 9 mm as well as other nodules bilaterally concerning for metastasis.  CT-guided biopsy was ordered by radiologist said due to her allergies it was not recommended at this time.  Patient has not followed up with Gyn Onc surgery since that decision was made a few weeks ago.  She would really prefer to observe and not do additional testing or biopsies at this time especially if she is asymptomatic.  She is open to updating imaging again in a year.  Discussed signs and symptoms of lung malignancy including hemoptysis, dyspnea, chest wall pain, and postobstructive pneumonia.  She will monitor closely for the development of any new or worsening pulmonary symptoms.  We will message the PA who ordered her imaging the letter noted the biopsy was canceled, would be worthwhile to consider updating her PET/CT to confirm whether this is a slow-growing metastasis versus enlargement of benign nodules.         Osteopenia of left hip with elevated FRAX     Chronic ongoing problem, as long as she maintains in the osteopenia range with her T-scores she would like to forego any prescription grade therapy.  Continue calcium and vitamin D.  Next bone density due in 2025.         Type 2 diabetes mellitus without complication, without long-term current use of insulin (HCC)     Chronic improved problem, A1c back down in the prediabetic range, not currently on any pharmacotherapy.  She is appropriately on statin and ACEI.  Retinal exam updated in clinic today.  No urine microalbuminuria.  She has mild chronic neuropathy but this predates her diabetes diagnosis.  Continue with regular monitoring to ensure stability.  No indication to add more medicine at this time.         Relevant Orders    POCT Retinal Eye Exam    VITAMIN B12    HEMOGLOBIN A1C    MICROALBUMIN CREAT RATIO URINE    Comp Metabolic Panel    CBC WITH DIFFERENTIAL    Vitamin D deficiency     Relevant Orders    VITAMIN D,25 HYDROXY (DEFICIENCY)          RTC: Return in about 6 months (around 5/6/2024).    I spent a total of 32 minutes with record review, exam, communication with the patient, communication with other providers, and documentation of this encounter.    PLEASE NOTE: This dictation was created using voice recognition software. I have made every reasonable attempt to correct obvious errors, but I expect that there are errors of grammar and possibly content that I did not discover before finalizing the note.      Temitope Bess, DO  Geriatric and Internal Medicine  [facility]

## 2023-11-06 NOTE — ASSESSMENT & PLAN NOTE
Chronic ongoing problem, lipids are well controlled on current medical therapy, continue atorvastatin 40 mg daily.

## 2023-11-06 NOTE — ASSESSMENT & PLAN NOTE
Chronic ongoing problem, as long as she maintains in the osteopenia range with her T-scores she would like to forego any prescription grade therapy.  Continue calcium and vitamin D.  Next bone density due in 2025.

## 2023-11-06 NOTE — ASSESSMENT & PLAN NOTE
Chronic worsening problem.  She had repeat chest x-ray per gynecologic oncologic surgery, this showed enlarging nodule so she was recommended to have CT chest.  There is a large 13 mm nodule which previously measured 9 mm as well as other nodules bilaterally concerning for metastasis.  CT-guided biopsy was ordered by radiologist said due to her allergies it was not recommended at this time.  Patient has not followed up with Gyn Onc surgery since that decision was made a few weeks ago.  She would really prefer to observe and not do additional testing or biopsies at this time especially if she is asymptomatic.  She is open to updating imaging again in a year.  Discussed signs and symptoms of lung malignancy including hemoptysis, dyspnea, chest wall pain, and postobstructive pneumonia.  She will monitor closely for the development of any new or worsening pulmonary symptoms.  We will message the PA who ordered her imaging the letter noted the biopsy was canceled, would be worthwhile to consider updating her PET/CT to confirm whether this is a slow-growing metastasis versus enlargement of benign nodules.

## 2023-11-07 ENCOUNTER — OFFICE VISIT (OUTPATIENT)
Dept: CARDIOLOGY | Facility: MEDICAL CENTER | Age: 73
End: 2023-11-07
Attending: INTERNAL MEDICINE
Payer: MEDICARE

## 2023-11-07 VITALS
SYSTOLIC BLOOD PRESSURE: 112 MMHG | DIASTOLIC BLOOD PRESSURE: 72 MMHG | RESPIRATION RATE: 14 BRPM | HEART RATE: 64 BPM | OXYGEN SATURATION: 97 % | HEIGHT: 63 IN | BODY MASS INDEX: 30.12 KG/M2 | WEIGHT: 170 LBS

## 2023-11-07 DIAGNOSIS — I48.0 PAROXYSMAL ATRIAL FIBRILLATION (HCC): ICD-10-CM

## 2023-11-07 PROCEDURE — 3074F SYST BP LT 130 MM HG: CPT | Performed by: INTERNAL MEDICINE

## 2023-11-07 PROCEDURE — 3078F DIAST BP <80 MM HG: CPT | Performed by: INTERNAL MEDICINE

## 2023-11-07 PROCEDURE — 93005 ELECTROCARDIOGRAM TRACING: CPT | Performed by: INTERNAL MEDICINE

## 2023-11-07 PROCEDURE — 99213 OFFICE O/P EST LOW 20 MIN: CPT | Performed by: INTERNAL MEDICINE

## 2023-11-07 PROCEDURE — 99214 OFFICE O/P EST MOD 30 MIN: CPT | Performed by: INTERNAL MEDICINE

## 2023-11-07 ASSESSMENT — FIBROSIS 4 INDEX: FIB4 SCORE: 1.15

## 2023-11-07 NOTE — PROGRESS NOTES
Arrhythmia Clinic Note (Established patient)    DOS: 11/7/2023    Chief complaint/Reason for consult: Afib    Interval History: 72 y/o F with low burden pAF on Pradaxa and metoprolol. Tough year for her, lost her  of 51 years. She is in reasonable spirits however. Having palpitations, wondering if she can take extra metoprolol PRN.    ROS (+ highlighted in bold):  Constitutional: Fevers/chills/fatigue/weightloss  HEENT: Blurry vision/eye pain/sore throat/hearing loss  Respiratory: Shortness of breath/cough  Cardiovascular: Chest pain/palpitations/edema/orthopnea/syncope  GI: Nausea/vomitting/diarrhea  MSK: Arthralgias/myagias/muscle weakness  Skin: Rash/sores  Neurological: Numbness/tremors/vertigo  Endocrine: Excessive thirst/polyuria/cold intolerance/heat intolerance  Psych: Depression/anxiety    Past Medical History:   Diagnosis Date    Allergic rhinitis     Anesthesia     hypotension    Atrial fibrillation (HCC)     Cancer (HCC) 2014    endometrial    CATARACT     bilateral lens implants    Cholesterol blood decreased     Dental disorder     dentures    Dermatitis, contact     Dyslipidemia 6/30/2016    Elevated alkaline phosphatase level 9/20/2021      Ref. Range 3/18/2021 08:22 9/3/2021 08:23  Alkaline Phosphatase Latest Ref Range: 30 - 99 U/L 85 104 (H)   New finding of mildly elevated alkaline phosphatase.  She has history of endometrial cancer.  She has osteopenia with elevated FRAX.  No known primary liver disease.    Encounter for general adult medical examination without abnormal findings 12/2/2014    Alexia for GI No flu shots- egg allergies  Declines all vaccines- had reaction to tdap    Endometrium cancer (HCC)     Hyperlipidemia     Hypertension     Impaired glucose tolerance     Macular degeneration     Obesity     Pneumonia     had PNU 35 years ago    Prolapsed uterus 8/2014    current issue    Pulmonary nodule 11/9/2016    No history of tobacco use CT 9/16, 12/16- no change-6/17- ? New  "nodule>>PET CT-10/17 neg     Stroke (HCC)     \"mini strokes\" TIA, no residual    TIA (transient ischemic attack)     Unspecified hemorrhagic conditions     NOSE BLEEDS/on Plavix    Vitamin D deficiency        Past Surgical History:   Procedure Laterality Date    HYSTERECTOMY ROBOTIC XI  8/28/2014    Performed by Pantera Wylie M.D. at SURGERY [facility] ORS    LYMPH NODE DISSECTION ROBOTIC XI  8/28/2014    Performed by Pantera Wylie M.D. at SURGERY [facility] ORS    CATARACT PHACO WITH IOL  8/19/2010    Performed by DAVE AGUILAR at SURGERY SAME DAY [facility] ORS    CATARACT PHACO WITH IOL  8/5/2010    Performed by DAVE AGUILAR at SURGERY SAME DAY [facility] ORS    HIP ARTHROSCOPY  5/12/08    Performed by ZINA HOLLIS at SURGERY [facility] ORS    ACETABULAR OSTEOTOMY  5/12/08    Performed by ZINA HOLLIS at SURGERY [facility] ORS    ORTHOPEDIC OSTEOTOMY  5/12/08    Performed by ZINA HOLLIS at SURGERY [facility] ORS    NASAL POLYPECTOMY  1994    \"removal of papilloma\"    ABDOMINAL HYSTERECTOMY TOTAL      EYE SURGERY         Social History     Socioeconomic History    Marital status:      Spouse name: Not on file    Number of children: Not on file    Years of education: Not on file    Highest education level: Not on file   Occupational History    Not on file   Tobacco Use    Smoking status: Never    Smokeless tobacco: Never    Tobacco comments:     continued abstinence   Vaping Use    Vaping Use: Never used   Substance and Sexual Activity    Alcohol use: No    Drug use: No    Sexual activity: Not Currently   Other Topics Concern    Not on file   Social History Narrative    She is  to Art for 49 years, they met at a [id number] [place]. She has a daughter Nikki (43, Tommy) and she has 2 kids. She retired in her late 60's. 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     Social Determinants of Health     Financial Resource Strain: Not on file   Food Insecurity: Not on file " "  Transportation Needs: Not on file   Physical Activity: Not on file   Stress: Not on file   Social Connections: Not on file   Intimate Partner Violence: Not on file   Housing Stability: Not on file       Family History   Problem Relation Age of Onset    Hyperlipidemia Mother     Hypertension Mother     Cancer Mother     No Known Problems Father     Heart Disease Sister     Alcohol/Drug Sister     Hypertension Sister     Heart Attack Sister     Cancer Sister 66        ? breast cancer as well    Hypertension Sister     Hyperlipidemia Sister     Cancer Other     Hypertension Other     Diabetes Maternal Grandmother         elderly    Other Sister         suicide     Stroke Neg Hx        Allergies   Allergen Reactions    Fentanyl Anaphylaxis    Flu Virus Vaccine     Versed Shortness of Breath and Vomiting    Bee      And  spider--- tongue swelling    Codeine      Increased heart rate    Eggs Vomiting    Lidocaine Hcl      \"severe drop in BP\"    Other Drug      Novocaine hypotension ALL \"MELISA\"    Penicillins      Increased heart rate    Soap Rash     Laundry Tide soap, Dove and Ivory soap    Tape Rash     Paper tape OK    Xylocaine [Lidocaine Hcl, Local Anesth.]      All \"MELISA\" hypotension       Current Outpatient Medications   Medication Sig Dispense Refill    dabigatran (PRADAXA) 150 MG Cap capsule TAKE 1 CAPSULE BY MOUTH TWICE A DAY* Capsule 1    glucose blood strip 1 Strip by Other route every day. 100 Strip 3    Lancets Lancets order: Lancets for Abbott Freestyle Lite meter. Sig: use once daily and prn ssx high or low sugar. #100 RF x 3 100 Each 3    lisinopril (PRINIVIL) 5 MG Tab Take 1 Tablet by mouth every day. 100 Tablet 3    metoprolol tartrate (LOPRESSOR) 25 MG Tab TAKE 1 TABLET BY MOUTH TWICE A  Tablet 3    atorvastatin (LIPITOR) 40 MG Tab Take 1 Tablet by mouth every evening. 100 Tablet 3    Multiple Vitamins-Minerals (PRESERVISION AREDS PO) Take  by mouth.      Cholecalciferol (VITAMIN D " "HIGH POTENCY PO) Take 2,000 mg by mouth every day.      Polyethylene Glycol 400 (BLINK TEARS OP) by Ophthalmic route every day.      Calcium 600 MG TABS Take 1 Tab by mouth every morning. Taking 1000mg  Indications: per pt is taking 500 mg      multivitamin (THERAGRAN) TABS Take 1 Tablet by mouth every morning.        Polyethyl Glycol-Propyl Glycol (SYSTANE OP) Place 1 Drop in both eyes every bedtime. Indications: **OTC**       No current facility-administered medications for this visit.       Physical Exam:  Vitals:    11/07/23 1050   BP: 112/72   BP Location: Left arm   Patient Position: Sitting   BP Cuff Size: Adult   Pulse: 64   Resp: 14   SpO2: 97%   Weight: 77.1 kg (170 lb)   Height: 1.6 m (5' 3\")     General appearance: NAD, conversant   Eyes: anicteric sclerae, moist conjunctivae; no lid-lag; PERRLA  HENT: Atraumatic; oropharynx clear with moist mucous membranes and no mucosal ulcerations; normal hard and soft palate  Neck: Trachea midline; FROM, supple, no thyromegaly or lymphadenopathy  Lungs: CTA, with normal respiratory effort and no intercostal retractions  CV: RRR, no MRGs, no JVD  Abdomen: Soft, non-tender; no masses or HSM  Extremities: No peripheral edema or extremity lymphadenopathy  Skin: Normal temperature, turgor and texture; no rash, ulcers or subcutaneous nodules  Psych: Appropriate affect, alert and oriented to person, place and time    Data:  Lipids:   Lab Results   Component Value Date/Time    CHOLSTRLTOT 146 09/25/2023 08:50 AM    TRIGLYCERIDE 87 09/25/2023 08:50 AM    HDL 61 09/25/2023 08:50 AM    LDL 68 09/25/2023 08:50 AM        BMP:  Lab Results   Component Value Date/Time    SODIUM 140 09/25/2023 0850    POTASSIUM 5.0 09/25/2023 0850    CHLORIDE 105 09/25/2023 0850    CO2 27 09/25/2023 0850    GLUCOSE 130 (H) 09/25/2023 0850    BUN 16 09/25/2023 0850    CREATININE 0.85 09/25/2023 0850    CALCIUM 9.3 09/25/2023 0850    ANION 8.0 09/25/2023 0850        TSH:   Lab Results   Component " "Value Date/Time    TSHULTRASEN 1.410 09/25/2023 0850        THYROXINE (T4):   No results found for: \"FREEDIR\"     CBC:   Lab Results   Component Value Date/Time    WBC 4.9 09/25/2023 08:50 AM    RBC 4.68 09/25/2023 08:50 AM    HEMOGLOBIN 13.9 09/25/2023 08:50 AM    HEMATOCRIT 44.1 09/25/2023 08:50 AM    MCV 94.2 09/25/2023 08:50 AM    MCH 29.7 09/25/2023 08:50 AM    MCHC 31.5 (L) 09/25/2023 08:50 AM    RDW 49.1 09/25/2023 08:50 AM    PLATELETCT 287 09/25/2023 08:50 AM    MPV 10.0 09/25/2023 08:50 AM    NEUTSPOLYS 64.10 09/25/2023 08:50 AM    LYMPHOCYTES 20.30 (L) 09/25/2023 08:50 AM    MONOCYTES 11.60 09/25/2023 08:50 AM    EOSINOPHILS 2.60 09/25/2023 08:50 AM    BASOPHILS 1.20 09/25/2023 08:50 AM    IMMGRAN 0.20 09/25/2023 08:50 AM    NRBC 0.00 09/25/2023 08:50 AM    NEUTS 3.15 09/25/2023 08:50 AM    LYMPHS 1.00 09/25/2023 08:50 AM    MONOS 0.57 09/25/2023 08:50 AM    EOS 0.13 09/25/2023 08:50 AM    BASO 0.06 09/25/2023 08:50 AM    IMMGRANAB 0.01 09/25/2023 08:50 AM    NRBCAB 0.00 09/25/2023 08:50 AM        CBC w/o DIFF  Lab Results   Component Value Date/Time    WBC 4.9 09/25/2023 08:50 AM    RBC 4.68 09/25/2023 08:50 AM    HEMOGLOBIN 13.9 09/25/2023 08:50 AM    MCV 94.2 09/25/2023 08:50 AM    MCH 29.7 09/25/2023 08:50 AM    MCHC 31.5 (L) 09/25/2023 08:50 AM    RDW 49.1 09/25/2023 08:50 AM    MPV 10.0 09/25/2023 08:50 AM       Prior echo/stress reviewed: Normal EF no ischemia      EKG interpreted by me: NSR    Impression/Plan:  1. Paroxysmal atrial fibrillation (HCC)  EKG        pAF  Hypercoagulable state due to afib    - Will continue metoprolol scheduled + PRN for now  - Consider AAD if worsening afib  - She does not do well with sedation  - Continue Pradaxa labs reviewed    Annual [facility]    Darrian Garcia MD  Cardiac Electrophysiology    "

## 2023-11-14 ENCOUNTER — APPOINTMENT (OUTPATIENT)
Dept: SLEEP MEDICINE | Facility: MEDICAL CENTER | Age: 73
End: 2023-11-14
Attending: FAMILY MEDICINE
Payer: MEDICARE

## 2023-11-20 LAB — EKG IMPRESSION: NORMAL

## 2023-11-20 PROCEDURE — 93010 ELECTROCARDIOGRAM REPORT: CPT | Performed by: INTERNAL MEDICINE

## 2023-11-26 ASSESSMENT — ENCOUNTER SYMPTOMS
SHORTNESS OF BREATH: 0
HEMOPTYSIS: 0
WHEEZING: 0
CHEST TIGHTNESS: 0
DYSPNEA AT REST: 0

## 2023-11-29 ENCOUNTER — OFFICE VISIT (OUTPATIENT)
Dept: SLEEP MEDICINE | Facility: MEDICAL CENTER | Age: 73
End: 2023-11-29
Attending: INTERNAL MEDICINE
Payer: MEDICARE

## 2023-11-29 VITALS
SYSTOLIC BLOOD PRESSURE: 126 MMHG | HEIGHT: 63 IN | DIASTOLIC BLOOD PRESSURE: 72 MMHG | WEIGHT: 172 LBS | HEART RATE: 66 BPM | OXYGEN SATURATION: 98 % | BODY MASS INDEX: 30.48 KG/M2

## 2023-11-29 DIAGNOSIS — R91.8 LUNG MASS: ICD-10-CM

## 2023-11-29 PROCEDURE — 3074F SYST BP LT 130 MM HG: CPT | Performed by: INTERNAL MEDICINE

## 2023-11-29 PROCEDURE — 3078F DIAST BP <80 MM HG: CPT | Performed by: INTERNAL MEDICINE

## 2023-11-29 PROCEDURE — 99212 OFFICE O/P EST SF 10 MIN: CPT | Performed by: INTERNAL MEDICINE

## 2023-11-29 PROCEDURE — 99203 OFFICE O/P NEW LOW 30 MIN: CPT | Performed by: INTERNAL MEDICINE

## 2023-11-29 ASSESSMENT — ENCOUNTER SYMPTOMS
CONSTITUTIONAL NEGATIVE: 1
EYES NEGATIVE: 1
GASTROINTESTINAL NEGATIVE: 1
RESPIRATORY NEGATIVE: 1
MUSCULOSKELETAL NEGATIVE: 1
NEUROLOGICAL NEGATIVE: 1
CARDIOVASCULAR NEGATIVE: 1
PSYCHIATRIC NEGATIVE: 1
SHORTNESS OF BREATH: 0
WHEEZING: 0
HEMOPTYSIS: 0

## 2023-11-29 ASSESSMENT — FIBROSIS 4 INDEX: FIB4 SCORE: 1.15
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
## 2023-12-01 NOTE — ASSESSMENT & PLAN NOTE
Enlarging from 2017 slowly  With the smooth borders and location very suggestive of carcinoid  Pt really does not want to pursue bx due to her multiple allergies  We could pursue a carcinoid specific scan if positive we could refer her to DR. Zarco our thoracic oncologists for recommendations without a bx  Pt in agreement

## 2023-12-07 DIAGNOSIS — E78.5 HYPERLIPIDEMIA, UNSPECIFIED HYPERLIPIDEMIA TYPE: ICD-10-CM

## 2023-12-07 DIAGNOSIS — I10 ESSENTIAL HYPERTENSION: ICD-10-CM

## 2023-12-08 RX ORDER — ATORVASTATIN CALCIUM 40 MG/1
40 TABLET, FILM COATED ORAL EVERY EVENING
Qty: 100 TABLET | Refills: 3 | Status: SHIPPED | OUTPATIENT
Start: 2023-12-08

## 2024-01-22 ENCOUNTER — PATIENT MESSAGE (OUTPATIENT)
Dept: CARDIOLOGY | Facility: MEDICAL CENTER | Age: 74
End: 2024-01-22
Payer: MEDICARE

## 2024-01-22 ENCOUNTER — PATIENT MESSAGE (OUTPATIENT)
Dept: SLEEP MEDICINE | Facility: MEDICAL CENTER | Age: 74
End: 2024-01-22

## 2024-01-22 DIAGNOSIS — I48.91 ATRIAL FIBRILLATION, UNSPECIFIED TYPE (HCC): ICD-10-CM

## 2024-01-22 NOTE — TELEPHONE ENCOUNTER
RX Coordinators- Can you help this patient with his pradaxa and sending in prior auth?  Thank you!

## 2024-01-24 RX ORDER — DABIGATRAN ETEXILATE 150 MG/1
CAPSULE ORAL
Qty: 200 CAPSULE | Refills: 1 | Status: SHIPPED | OUTPATIENT
Start: 2024-01-24

## 2024-01-24 NOTE — TELEPHONE ENCOUNTER
When I process this at [facility] it goes through just fine with no authorization. Generic pays for $30.00 for a 90 day supply. However we are not able to obtain medication as its a national  backorder.

## 2024-01-24 NOTE — TELEPHONE ENCOUNTER
"Spoke with patient, updated her on the backorder status of Dabigatran (Pradaxa) and what to expect going forward with the medication availability. I let her know that currently, the only local pharmacy that has the medication is [facility]. However, [facility] will need to order the medication once they receive the prescription. She consented to having her prescription sent to [facility], and I advised her that she does not need to have a [facility] membership to use their pharmacy. I provided her with the phone number for [facility] pharmacy, and ran a test claim for 30 and 90 days to see what her co-pays are currently. $12 / 30 days and $30 / 90 days. I let her know that I would have the prescription sent over to [facility] and that I would call [facility] to verify they received the medication, provide her insurance and set up her profile with them so that she receives a phone call when the prescription has been completed and ready for .     Going forward, the patient is aware to give me a call about a week or two before she runs out of her 90 day supply, so we can check the status of the backorder and if available, she will want the prescription at her local [facility] and knows if it is not and is still available at [facility], she will continue there until it is available again. She does not wish to change medications at this time, as she doesn't want to change when this has been working for her unless she has to.    Can we please send a new prescription for Dabigatran 90 day supply and include \"[facility]\" in the pharmacy notes on RX to ensure it is routed to the appropriate pharmacy?    Thank you!!    Allie BUNCH  Rx Coordinator  "

## 2024-01-24 NOTE — TELEPHONE ENCOUNTER
Allie- Can we check to see if this patient would be able to get an RX through [facility]? Has 15 days left. Thank you!!!

## 2024-03-05 DIAGNOSIS — R91.1 PULMONARY NODULE: ICD-10-CM

## 2024-03-18 DIAGNOSIS — I10 ESSENTIAL (PRIMARY) HYPERTENSION: ICD-10-CM

## 2024-03-18 RX ORDER — LISINOPRIL 5 MG/1
5 TABLET ORAL DAILY
Qty: 100 TABLET | Refills: 3 | Status: SHIPPED | OUTPATIENT
Start: 2024-03-18

## 2024-03-28 ENCOUNTER — HOSPITAL ENCOUNTER (OUTPATIENT)
Dept: RADIOLOGY | Facility: MEDICAL CENTER | Age: 74
End: 2024-03-28
Attending: INTERNAL MEDICINE
Payer: MEDICARE

## 2024-03-28 DIAGNOSIS — R91.1 PULMONARY NODULE: ICD-10-CM

## 2024-03-28 PROCEDURE — A9592 CT-PETCT-NEUROENDOCRINE SKULL BASE TO MID-THIGH: HCPCS

## 2024-04-05 ENCOUNTER — PATIENT MESSAGE (OUTPATIENT)
Dept: SLEEP MEDICINE | Facility: MEDICAL CENTER | Age: 74
End: 2024-04-05
Payer: MEDICARE

## 2024-04-26 ENCOUNTER — TELEPHONE (OUTPATIENT)
Dept: SLEEP MEDICINE | Facility: MEDICAL CENTER | Age: 74
End: 2024-04-26
Payer: MEDICARE

## 2024-04-26 NOTE — TELEPHONE ENCOUNTER
Caller:  Cristela    Topic/issue: Cristela would like a call from Dr. Salinas, to go over her CT results, as she cannot get an appt with her.     Callback Number: [phone]    Thank you,   Layne MONTES

## 2024-04-29 ENCOUNTER — OFFICE VISIT (OUTPATIENT)
Dept: CARDIOLOGY | Facility: MEDICAL CENTER | Age: 74
End: 2024-04-29
Attending: INTERNAL MEDICINE
Payer: MEDICARE

## 2024-04-29 VITALS
OXYGEN SATURATION: 97 % | DIASTOLIC BLOOD PRESSURE: 66 MMHG | WEIGHT: 175 LBS | BODY MASS INDEX: 31.01 KG/M2 | HEIGHT: 63 IN | SYSTOLIC BLOOD PRESSURE: 126 MMHG | HEART RATE: 65 BPM | RESPIRATION RATE: 14 BRPM

## 2024-04-29 DIAGNOSIS — I48.0 PAROXYSMAL ATRIAL FIBRILLATION (HCC): ICD-10-CM

## 2024-04-29 DIAGNOSIS — I48.0 HYPERCOAGULABLE STATE DUE TO PAROXYSMAL ATRIAL FIBRILLATION (HCC): ICD-10-CM

## 2024-04-29 DIAGNOSIS — E78.00 PURE HYPERCHOLESTEROLEMIA: ICD-10-CM

## 2024-04-29 DIAGNOSIS — E11.9 TYPE 2 DIABETES MELLITUS WITHOUT COMPLICATION, WITHOUT LONG-TERM CURRENT USE OF INSULIN (HCC): ICD-10-CM

## 2024-04-29 DIAGNOSIS — D68.69 HYPERCOAGULABLE STATE DUE TO PAROXYSMAL ATRIAL FIBRILLATION (HCC): ICD-10-CM

## 2024-04-29 DIAGNOSIS — I48.91 ATRIAL FIBRILLATION, UNSPECIFIED TYPE (HCC): ICD-10-CM

## 2024-04-29 DIAGNOSIS — Z79.01 CHRONIC ANTICOAGULATION: ICD-10-CM

## 2024-04-29 DIAGNOSIS — R00.2 PALPITATIONS: ICD-10-CM

## 2024-04-29 DIAGNOSIS — I10 ESSENTIAL (PRIMARY) HYPERTENSION: ICD-10-CM

## 2024-04-29 PROCEDURE — 99213 OFFICE O/P EST LOW 20 MIN: CPT | Performed by: INTERNAL MEDICINE

## 2024-04-29 RX ORDER — DABIGATRAN ETEXILATE 150 MG/1
CAPSULE ORAL
Qty: 200 CAPSULE | Refills: 3 | Status: SHIPPED | OUTPATIENT
Start: 2024-04-29

## 2024-04-29 ASSESSMENT — ENCOUNTER SYMPTOMS
CONSTITUTIONAL NEGATIVE: 1
PALPITATIONS: 1
DEPRESSION: 0
NERVOUS/ANXIOUS: 0
SHORTNESS OF BREATH: 0
DOUBLE VISION: 0
CHILLS: 0
MUSCULOSKELETAL NEGATIVE: 1
PSYCHIATRIC NEGATIVE: 1
VOMITING: 0
FOCAL WEAKNESS: 0
EYES NEGATIVE: 1
ABDOMINAL PAIN: 0
BLURRED VISION: 0
CLAUDICATION: 0
FEVER: 0
MYALGIAS: 0
BRUISES/BLEEDS EASILY: 0
RESPIRATORY NEGATIVE: 1
NAUSEA: 0
COUGH: 0
GASTROINTESTINAL NEGATIVE: 1
DIZZINESS: 0
WEIGHT LOSS: 0
HEADACHES: 0
NEUROLOGICAL NEGATIVE: 1
WEAKNESS: 0

## 2024-04-29 ASSESSMENT — FIBROSIS 4 INDEX: FIB4 SCORE: 1.15

## 2024-04-29 NOTE — PROGRESS NOTES
"Chief Complaint   Patient presents with    Atrial Fibrillation     F/v dx: Paroxysmal atrial fibrillation (HCC)    Hypertension    Hyperlipidemia       Subjective     Cristela Alan is a 73 y.o. female who presents today for annual follow up of atrial fibrillation on chronic anticoagulation therapy.    Since the patient's last visit on 04/07/23, she has been doing well clinically. She admits to palpitations. She denies chest pain, shortness of breath, nausea/vomiting or diaphoresis. She is being evaluated for lung mass.    Past Medical History:   Diagnosis Date    Allergic rhinitis     Anesthesia     hypotension    Atrial fibrillation (HCC)     Cancer (HCC) 2014    endometrial    CATARACT     bilateral lens implants    Cholesterol blood decreased     Dental disorder     dentures    Dermatitis, contact     Dyslipidemia 06/30/2016    Elevated alkaline phosphatase level 09/20/2021      Ref. Range 3/18/2021 08:22 9/3/2021 08:23  Alkaline Phosphatase Latest Ref Range: 30 - 99 U/L 85 104 (H)   New finding of mildly elevated alkaline phosphatase.  She has history of endometrial cancer.  She has osteopenia with elevated FRAX.  No known primary liver disease.    Encounter for general adult medical examination without abnormal findings 12/02/2014    [facility] for GI No flu shots- egg allergies  Declines all vaccines- had reaction to tdap    Endometrium cancer (HCC)     Hyperlipidemia     Hypertension     Impaired glucose tolerance     Macular degeneration     Obesity     Pneumonia     had PNU 35 years ago    Prolapsed uterus 08/2014    current issue    Pulmonary nodule 11/09/2016    No history of tobacco use CT 9/16, 12/16- no change-6/17- ? New nodule>>PET CT-10/17 neg     Stroke (HCC)     \"mini strokes\" TIA, no residual    TIA (transient ischemic attack)     Unspecified hemorrhagic conditions     NOSE BLEEDS/on Plavix    Vitamin D deficiency      Past Surgical History:   Procedure Laterality Date    HYSTERECTOMY ROBOTIC XI  " "8/28/2014    Performed by Pantera Wylie M.D. at SURGERY [facility] ORS    LYMPH NODE DISSECTION ROBOTIC XI  8/28/2014    Performed by Pantera Wylie M.D. at SURGERY [facility] ORS    CATARACT PHACO WITH IOL  8/19/2010    Performed by DAVE AGUILAR at SURGERY SAME DAY [facility] ORS    CATARACT PHACO WITH IOL  8/5/2010    Performed by DAVE AGUILAR at SURGERY SAME DAY [facility] ORS    HIP ARTHROSCOPY  5/12/08    Performed by ZINA HOLLIS at SURGERY [facility] ORS    ACETABULAR OSTEOTOMY  5/12/08    Performed by ZINA HOLLIS at SURGERY [facility]    ORTHOPEDIC OSTEOTOMY  5/12/08    Performed by ZINA HOLLIS at SURGERY [facility] ORS    NASAL POLYPECTOMY  1994    \"removal of papilloma\"    ABDOMINAL HYSTERECTOMY TOTAL      EYE SURGERY       Family History   Problem Relation Age of Onset    Hyperlipidemia Mother     Hypertension Mother     Cancer Mother     No Known Problems Father     Heart Disease Sister     Alcohol/Drug Sister     Hypertension Sister     Heart Attack Sister     Cancer Sister 66        ? breast cancer as well    Hypertension Sister     Hyperlipidemia Sister     Cancer Other     Hypertension Other     Diabetes Maternal Grandmother         elderly    Other Sister         suicide     Stroke Neg Hx      Social History     Socioeconomic History    Marital status:      Spouse name: Not on file    Number of children: Not on file    Years of education: Not on file    Highest education level: Not on file   Occupational History    Not on file   Tobacco Use    Smoking status: Never    Smokeless tobacco: Never    Tobacco comments:     continued abstinence   Vaping Use    Vaping Use: Never used   Substance and Sexual Activity    Alcohol use: No    Drug use: No    Sexual activity: Not Currently   Other Topics Concern    Not on file   Social History Narrative    She is  to Art for 49 years, they met at a [employer]. She has a daughter Nikki (43, [place]) and she has 2 kids. She " "retired in her late 60's. 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     Social Determinants of Health     Financial Resource Strain: Not on file   Food Insecurity: Not on file   Transportation Needs: Not on file   Physical Activity: Not on file   Stress: Not on file   Social Connections: Not on file   Intimate Partner Violence: Not on file   Housing Stability: Not on file     Allergies   Allergen Reactions    Fentanyl Anaphylaxis    Flu Virus Vaccine     Versed Shortness of Breath and Vomiting    Bee      And  spider--- tongue swelling    Codeine      Increased heart rate    Eggs Vomiting    Lidocaine Hcl      \"severe drop in BP\"    Other Drug      Novocaine hypotension ALL \"MELISA\"    Penicillins      Increased heart rate    Soap Rash     Laundry Tide soap, Dove and Ivory soap    Tape Rash     Paper tape OK    Xylocaine [Lidocaine Hcl, Local Anesth.]      All \"MELISA\" hypotension     (Medications reviewed.)  Outpatient Encounter Medications as of 4/29/2024   Medication Sig Dispense Refill    dabigatran (PRADAXA) 150 MG Cap capsule TAKE 1 CAPSULE BY MOUTH TWICE A DAY* Capsule 3    lisinopril (PRINIVIL) 5 MG Tab TAKE 1 TABLET BY MOUTH EVERY  Tablet 3    atorvastatin (LIPITOR) 40 MG Tab TAKE 1 TABLET BY MOUTH EVERY DAY IN THE EVENING 100 Tablet 3    metoprolol tartrate (LOPRESSOR) 25 MG Tab TAKE 1 TABLET BY MOUTH TWICE A  Tablet 3    glucose blood strip 1 Strip by Other route every day. 100 Strip 3    Lancets Lancets order: Lancets for Abbott Freestyle Lite meter. Sig: use once daily and prn ssx high or low sugar. #100 RF x 3 100 Each 3    Multiple Vitamins-Minerals (PRESERVISION AREDS PO) Take  by mouth.      Cholecalciferol (VITAMIN D HIGH POTENCY PO) Take 2,000 mg by mouth every day.      Polyethylene Glycol 400 (BLINK TEARS OP) by Ophthalmic route every day.      Calcium 600 MG TABS Take 1 Tablet by mouth every morning. Taking 2000mg  Indications: per pt is taking 500 mg      " "multivitamin (THERAGRAN) TABS Take 1 Tablet by mouth every morning.        Polyethyl Glycol-Propyl Glycol (SYSTANE OP) Place 1 Drop in both eyes every bedtime. Indications: **OTC**      [DISCONTINUED] dabigatran (PRADAXA) 150 MG Cap capsule TAKE 1 CAPSULE BY MOUTH TWICE A DAY* Capsule 1     No facility-administered encounter medications on file as of 4/29/2024.     Review of Systems   Constitutional: Negative.  Negative for chills, fever, malaise/fatigue and weight loss.   HENT: Negative.  Negative for hearing loss.    Eyes: Negative.  Negative for blurred vision and double vision.   Respiratory: Negative.  Negative for cough and shortness of breath.    Cardiovascular:  Positive for palpitations. Negative for chest pain, claudication and leg swelling.   Gastrointestinal: Negative.  Negative for abdominal pain, nausea and vomiting.   Genitourinary: Negative.  Negative for dysuria and urgency.   Musculoskeletal: Negative.  Negative for joint pain and myalgias.   Skin: Negative.  Negative for itching and rash.   Neurological: Negative.  Negative for dizziness, focal weakness, weakness and headaches.   Endo/Heme/Allergies: Negative.  Does not bruise/bleed easily.   Psychiatric/Behavioral: Negative.  Negative for depression. The patient is not nervous/anxious.               Objective     /66 (BP Location: Left arm, Patient Position: Sitting, BP Cuff Size: Adult)   Pulse 65   Resp 14   Ht 1.6 m (5' 3\")   Wt 79.4 kg (175 lb)   SpO2 97%   BMI 31.00 kg/m²     Physical Exam  Constitutional:       Appearance: Normal appearance. She is well-developed and normal weight.   HENT:      Head: Normocephalic and atraumatic.   Neck:      Vascular: No JVD.   Cardiovascular:      Rate and Rhythm: Normal rate and regular rhythm.      Heart sounds: Normal heart sounds.   Pulmonary:      Effort: Pulmonary effort is normal.      Breath sounds: Normal breath sounds.   Abdominal:      General: Bowel sounds are normal.      " Palpations: Abdomen is soft.      Comments: No hepatosplenomegaly.   Musculoskeletal:         General: Normal range of motion.   Lymphadenopathy:      Cervical: No cervical adenopathy.   Skin:     General: Skin is warm and dry.   Neurological:      Mental Status: She is alert and oriented to person, place, and time.            CARDIAC STUDIES/PROCEDURES:     CAROTID ULTRASOUND (06/18/18)  Mild bilateral internal carotid artery stenosis (<50%).     ECHOCARDIOGRAM CONCLUSIONS (06/18/18)  Compared to the images of the prior study.   Normal left ventricular systolic function.  Left ventricular ejection fraction is visually estimated to be 75%.  Grade II diastolic dysfunction.  No significant valve abnormalities.      ECHOCARDIOGRAM CONCLUSIONS (12/05/12)  Technically difficult study  Normal left ventricular size, thickness, systolic function, and diastolic function.  Left ventricular ejection fraction is 55% to 60%.  Structurally normal mitral valve.  No stenosis or regurgitation seen.  Structurally normal aortic valve.  No stenosis or regurgitation seen.  Structurally normal tricuspid valve.  Trace tricuspid regurgitation.      EKG performed on (08/24/14) EKG shows normal sinus rhythm.  EKG performed on (07/30/14) EKG shows normal sinus rhythm.  EKG performed on (12/06/12) EKG shows normal sinus rhythm.  EKG performed on (12/05/12) EKG shows atrial fibrillation.     Laboratory results of (09/25/23) were reviewed. Cholesterol profile of 146/87/61/68 mg/dL noted.   Laboratory results of (03/27/23) Cholesterol profile of 144/90/63/63 mg/dL noted.  Laboratory results of (09/14/20) Cholesterol profile of 183/136/60/96 mg/dl noted.  Laboratory results of (05/30/19) Cholesterol profile of 179/132/56/97 noted.  Laboratory results of (06/12/17) Cholesterol profile of 169/137/54/88 noted.  Laboratory results of (09/14/16) Cholesterol profile of 151/103/55/75 noted.  Laboratory results of (06/01/15) Cholesterol profile of  190/150/54/106 noted.     MYOCARDIAL PERFUSION STUDY CONCLUSIONS (06/18/18)  No evidence of significant jeopardized viable myocardium or prior myocardial infarction.  Normal left ventricular size, ejection fraction, and wall motion.  ECG INTERPRETATION  Negative stress ECG for ischemia.     MPI CONCLUSIONS (06/07/06)  NORMAL MYOCARDIAL PERFUSION IMAGES WITH NO EVIDENCE OF SIGNIFICANT   ISCHEMIA OR INFARCTION.    Assessment & Plan     1. Essential (primary) hypertension        2. Pure hypercholesterolemia        3. Paroxysmal atrial fibrillation (HCC)        4. Hypercoagulable state due to paroxysmal atrial fibrillation ([facility])        5. Chronic anticoagulation        6. Type 2 diabetes mellitus without complication, without long-term current use of insulin ([facility])        7. Atrial fibrillation, unspecified type ([facility])  dabigatran (PRADAXA) 150 MG Cap capsule          Medical Decision Making: Today's Assessment/Status/Plan:        Hypertension: Blood pressure is well controlled. We will continue with metoprolol, lisinopril.  Hyperlipidemia: She is doing well on statin therapy without myalgia symptoms. (Managed by primary care physician)  Atrial fibrillation on anticoagulation therapy (Pradaxa), palpitations: She is experiencing palpitations. We will perform Zio Patch, repeat echocardiogram, myocardial perfusion imaging study. (Previously managed by Jose Garcia)    History of trans-ischemic attack (2000): She remains clinically stable without any new neurological symptoms.  Health maintenance: She underwent surgery for uterine cancer (08/27/14).    We will follow up in 3 months.    CC Temitope Mccullough

## 2024-04-29 NOTE — TELEPHONE ENCOUNTER
Called and spoke with pt. Scheduled pt an appt on 05/21/24 with Dr Salinas to go over PET scan results.

## 2024-05-07 ENCOUNTER — APPOINTMENT (OUTPATIENT)
Dept: MEDICAL GROUP | Facility: PHYSICIAN GROUP | Age: 74
End: 2024-05-07
Payer: MEDICARE

## 2024-05-07 VITALS
BODY MASS INDEX: 31.38 KG/M2 | HEIGHT: 63 IN | HEART RATE: 67 BPM | RESPIRATION RATE: 16 BRPM | WEIGHT: 177.1 LBS | OXYGEN SATURATION: 97 % | DIASTOLIC BLOOD PRESSURE: 68 MMHG | TEMPERATURE: 96.7 F | SYSTOLIC BLOOD PRESSURE: 122 MMHG

## 2024-05-07 DIAGNOSIS — R94.2 ABNORMAL PET SCAN OF LUNG: ICD-10-CM

## 2024-05-07 DIAGNOSIS — I48.0 HYPERCOAGULABLE STATE DUE TO PAROXYSMAL ATRIAL FIBRILLATION (HCC): ICD-10-CM

## 2024-05-07 DIAGNOSIS — I10 ESSENTIAL (PRIMARY) HYPERTENSION: ICD-10-CM

## 2024-05-07 DIAGNOSIS — C79.51 CANCER, METASTATIC TO BONE (HCC): ICD-10-CM

## 2024-05-07 DIAGNOSIS — D68.69 HYPERCOAGULABLE STATE DUE TO PAROXYSMAL ATRIAL FIBRILLATION (HCC): ICD-10-CM

## 2024-05-07 DIAGNOSIS — D3A.8 NEUROENDOCRINE TUMOR: ICD-10-CM

## 2024-05-07 PROCEDURE — 3074F SYST BP LT 130 MM HG: CPT | Performed by: INTERNAL MEDICINE

## 2024-05-07 PROCEDURE — 3078F DIAST BP <80 MM HG: CPT | Performed by: INTERNAL MEDICINE

## 2024-05-07 PROCEDURE — 99214 OFFICE O/P EST MOD 30 MIN: CPT | Performed by: INTERNAL MEDICINE

## 2024-05-07 ASSESSMENT — FIBROSIS 4 INDEX: FIB4 SCORE: 1.15

## 2024-05-07 ASSESSMENT — PATIENT HEALTH QUESTIONNAIRE - PHQ9: CLINICAL INTERPRETATION OF PHQ2 SCORE: 0

## 2024-05-07 NOTE — ASSESSMENT & PLAN NOTE
Stable problem, continue current medical therapy with lisinopril 5 mg daily metoprolol titrate 25 mg twice daily and she will be updating all of her cardiac testing per her cardiologist in the coming weeks in case she would require surgery or treatment for new diagnosis of carcinoid.

## 2024-05-07 NOTE — PROGRESS NOTES
Subjective:   Chief Complaint/History of Present Illness:  Cristela Alan is a 73 y.o. female established patient who presents today to discuss medical problems as listed below. Cristela is unaccompanied for today's visit.    Problem   Neuroendocrine Tumor    Pet CT Neuroendocrine (3/2024):   CHEST:  Focal uptake in the RIGHT upper lung corresponding to the RIGHT upper lobe nodule, Max SUV 9.01.    She had enlarging pulmonary nodule noted on chest xray and CT chest ordered by gyn onc surgery prompting referral to pulmonary. Due to significant side effects and reactions to topical anesthetic and conscious sedation she was trying to avoid biopsy which prompted the Pet CT neuroendocrine which suggests RUL neuroendocrine tumor. Also there was focal uptake in T11 concerning for metastasis and she has focal tenderness on exam in that area.     T11 uptake on PET CT concerning for bony metastasis    She had focal T11 uptake on recent PET/CT evaluating enlarging right upper lobe mass which was concerning for neuroendocrine tumor.  She was not aware she had tenderness around that area of her spine until I palpate in clinic today.  She has not yet been able to go through her PET/CT results with the pulmonologist who ordered in March 2024 to delays in getting an appointment is agreeable for me to reach out to Dr. Zarco and get the referral started for medical oncology.     Hypercoagulable State Due to Paroxysmal Atrial Fibrillation (Hcc)    She has been using Pradaxa since 2012 due to paroxysmal atrial fibrillation.  No complications of bruising or bleeding at this time.     Essential (Primary) Hypertension    She has a longstanding history of hypertension.  She is on dual therapy.  She has diagnosis of paroxysmal atrial fibrillation and diastolic dysfunction.  Most recent echocardiogram was stable and EKG has maintained normal sinus rhythm.  No chest pain or pressure, no dyspnea on exertion, no presyncope or syncope.   "Electrolytes and kidney function are stable.  No signs or symptoms of orthostasis.    Blood pressure in clinic ranges 102-120/60-68    Current regimen: Lisinopril 5 mg daily and metoprolol tartrate 25 mg twice daily          Current Medications:  Current Outpatient Medications Ordered in Epic   Medication Sig Dispense Refill    dabigatran (PRADAXA) 150 MG Cap capsule TAKE 1 CAPSULE BY MOUTH TWICE A DAY* Capsule 3    lisinopril (PRINIVIL) 5 MG Tab TAKE 1 TABLET BY MOUTH EVERY  Tablet 3    atorvastatin (LIPITOR) 40 MG Tab TAKE 1 TABLET BY MOUTH EVERY DAY IN THE EVENING 100 Tablet 3    metoprolol tartrate (LOPRESSOR) 25 MG Tab TAKE 1 TABLET BY MOUTH TWICE A  Tablet 3    glucose blood strip 1 Strip by Other route every day. 100 Strip 3    Lancets Lancets order: Lancets for Abbott Freestyle Lite meter. Sig: use once daily and prn ssx high or low sugar. #100 RF x 3 100 Each 3    Multiple Vitamins-Minerals (PRESERVISION AREDS PO) Take  by mouth.      Cholecalciferol (VITAMIN D HIGH POTENCY PO) Take 2,000 mg by mouth every day.      Polyethylene Glycol 400 (BLINK TEARS OP) by Ophthalmic route every day.      Calcium 600 MG TABS Take 1 Tablet by mouth every morning. Taking 2000mg  Indications: per pt is taking 500 mg      Polyethyl Glycol-Propyl Glycol (SYSTANE OP) Place 1 Drop in both eyes every bedtime. Indications: **OTC**       No current Epic-ordered facility-administered medications on file.          Objective:   Physical Exam:    Vitals: /68 (BP Location: Left arm, Patient Position: Sitting, BP Cuff Size: Adult)   Pulse 67   Temp 35.9 °C (96.7 °F) (Temporal)   Resp 16   Ht 1.6 m (5' 3\")   Wt 80.3 kg (177 lb 1.6 oz)   SpO2 97%    BMI: Body mass index is 31.37 kg/m².  Physical Exam  Constitutional:       General: She is not in acute distress.     Appearance: Normal appearance. She is not ill-appearing.   HENT:      Right Ear: External ear normal.      Left Ear: External ear normal. "   Eyes:      General: No scleral icterus.     Conjunctiva/sclera: Conjunctivae normal.   Cardiovascular:      Rate and Rhythm: Normal rate and regular rhythm.   Pulmonary:      Effort: Pulmonary effort is normal. No respiratory distress.      Breath sounds: No wheezing.   Abdominal:      General: There is no distension.      Palpations: Abdomen is soft.   Musculoskeletal:         General: Tenderness present.      Comments: Lower thoracic spine with tenderness on palpation on spinous process, no tenderness at paravertebral musculature.   Skin:     General: Skin is warm and dry.      Findings: No rash.   Psychiatric:         Mood and Affect: Mood normal.         Behavior: Behavior normal.         Thought Content: Thought content normal.         Judgment: Judgment normal.               Assessment and Plan:   Cristela is a 73 y.o. female with the following:  Problem List Items Addressed This Visit       Essential (primary) hypertension     Stable problem, continue current medical therapy with lisinopril 5 mg daily metoprolol titrate 25 mg twice daily and she will be updating all of her cardiac testing per her cardiologist in the coming weeks in case she would require surgery or treatment for new diagnosis of carcinoid.         Hypercoagulable state due to paroxysmal atrial fibrillation (HCC)     Chronic stable problem, continue dabigatran 150 mg twice daily for stroke prophylaxis related to paroxysmal atrial fibrillation.         Neuroendocrine tumor  Abnormal Pet CT     New finding, dilation prompted because of slowly enlarging lung nodule per her preference to avoid biopsy as noted above she had PET/CT neuroendocrine subtype.  This confirmed likely neuroendocrine in the right upper lung however it also noted focal uptake at T11 concerning for metastasis and she does have tenderness on palpation of the spinous process around T11 on exam today.  Has not yet seen pulmonary to review PET/CT results from March, scheduled in 2  more weeks.  Agreeable for me to start the referral to medical oncology and I will reach out to Dr. Zarco to see if she would like any additional imaging or lab work to further evaluate the T11 abnormality.         Relevant Orders    Referral to Hematology Oncology    T11 uptake on PET CT concerning for bony metastasis     Problem, referral placed to medical oncology to establish with Dr. Zarco, will reach out to see if she would like me to order bone scan or MRI to further evaluate the T11 area and also if there are any specific labs she would like completed before their visit.  Patient is open to discussing all treatment options that she would be eligible for.  She has been worried due to the delay in the timing of her PET/CT and not yet knowing what the next steps are.         Relevant Orders    Referral to Hematology Oncology       RTC: Return in about 2 months (around 7/7/2024).    I spent a total of 37 minutes with record review, exam, communication with the patient, communication with other providers, and documentation of this encounter.    PLEASE NOTE: This dictation was created using voice recognition software. I have made every reasonable attempt to correct obvious errors, but I expect that there are errors of grammar and possibly content that I did not discover before finalizing the note.      Temitope Bess, DO  Geriatric and Internal Medicine  [facility]

## 2024-05-07 NOTE — ASSESSMENT & PLAN NOTE
Problem, referral placed to medical oncology to establish with Dr. Zarco, will reach out to see if she would like me to order bone scan or MRI to further evaluate the T11 area and also if there are any specific labs she would like completed before their visit.  Patient is open to discussing all treatment options that she would be eligible for.  She has been worried due to the delay in the timing of her PET/CT and not yet knowing what the next steps are.

## 2024-05-07 NOTE — ASSESSMENT & PLAN NOTE
New finding, dilation prompted because of slowly enlarging lung nodule per her preference to avoid biopsy as noted above she had PET/CT neuroendocrine subtype.  This confirmed likely neuroendocrine in the right upper lung however it also noted focal uptake at T11 concerning for metastasis and she does have tenderness on palpation of the spinous process around T11 on exam today.  Has not yet seen pulmonary to review PET/CT results from March, scheduled in 2 more weeks.  Agreeable for me to start the referral to medical oncology and I will reach out to Dr. Zarco to see if she would like any additional imaging or lab work to further evaluate the T11 abnormality.

## 2024-05-07 NOTE — ASSESSMENT & PLAN NOTE
Chronic stable problem, continue dabigatran 150 mg twice daily for stroke prophylaxis related to paroxysmal atrial fibrillation.

## 2024-05-13 ENCOUNTER — HOSPITAL ENCOUNTER (OUTPATIENT)
Dept: RADIOLOGY | Facility: MEDICAL CENTER | Age: 74
End: 2024-05-13
Attending: INTERNAL MEDICINE
Payer: MEDICARE

## 2024-05-13 ENCOUNTER — HOSPITAL ENCOUNTER (OUTPATIENT)
Dept: LAB | Facility: MEDICAL CENTER | Age: 74
End: 2024-05-13
Attending: INTERNAL MEDICINE
Payer: MEDICARE

## 2024-05-13 DIAGNOSIS — I48.0 PAROXYSMAL ATRIAL FIBRILLATION (HCC): ICD-10-CM

## 2024-05-13 DIAGNOSIS — R00.2 PALPITATIONS: ICD-10-CM

## 2024-05-13 DIAGNOSIS — E11.9 TYPE 2 DIABETES MELLITUS WITHOUT COMPLICATION, WITHOUT LONG-TERM CURRENT USE OF INSULIN (HCC): ICD-10-CM

## 2024-05-13 DIAGNOSIS — E78.00 PURE HYPERCHOLESTEROLEMIA: ICD-10-CM

## 2024-05-13 DIAGNOSIS — I10 ESSENTIAL (PRIMARY) HYPERTENSION: ICD-10-CM

## 2024-05-13 DIAGNOSIS — E55.9 VITAMIN D DEFICIENCY: ICD-10-CM

## 2024-05-13 LAB
25(OH)D3 SERPL-MCNC: 54 NG/ML (ref 30–100)
ALBUMIN SERPL BCP-MCNC: 4.1 G/DL (ref 3.2–4.9)
ALBUMIN/GLOB SERPL: 1.9 G/DL
ALP SERPL-CCNC: 92 U/L (ref 30–99)
ALT SERPL-CCNC: 12 U/L (ref 2–50)
ANION GAP SERPL CALC-SCNC: 10 MMOL/L (ref 7–16)
AST SERPL-CCNC: 18 U/L (ref 12–45)
BASOPHILS # BLD AUTO: 1.3 % (ref 0–1.8)
BASOPHILS # BLD: 0.06 K/UL (ref 0–0.12)
BILIRUB SERPL-MCNC: 0.8 MG/DL (ref 0.1–1.5)
BUN SERPL-MCNC: 14 MG/DL (ref 8–22)
CALCIUM ALBUM COR SERPL-MCNC: 9.2 MG/DL (ref 8.5–10.5)
CALCIUM SERPL-MCNC: 9.3 MG/DL (ref 8.5–10.5)
CHLORIDE SERPL-SCNC: 107 MMOL/L (ref 96–112)
CHOLEST SERPL-MCNC: 141 MG/DL (ref 100–199)
CO2 SERPL-SCNC: 25 MMOL/L (ref 20–33)
CREAT SERPL-MCNC: 0.69 MG/DL (ref 0.5–1.4)
CREAT UR-MCNC: 62.56 MG/DL
EOSINOPHIL # BLD AUTO: 0.15 K/UL (ref 0–0.51)
EOSINOPHIL NFR BLD: 3.2 % (ref 0–6.9)
ERYTHROCYTE [DISTWIDTH] IN BLOOD BY AUTOMATED COUNT: 47.9 FL (ref 35.9–50)
EST. AVERAGE GLUCOSE BLD GHB EST-MCNC: 143 MG/DL
FASTING STATUS PATIENT QL REPORTED: NORMAL
GFR SERPLBLD CREATININE-BSD FMLA CKD-EPI: 90 ML/MIN/1.73 M 2
GLOBULIN SER CALC-MCNC: 2.2 G/DL (ref 1.9–3.5)
GLUCOSE SERPL-MCNC: 123 MG/DL (ref 65–99)
HBA1C MFR BLD: 6.6 % (ref 4–5.6)
HCT VFR BLD AUTO: 43.3 % (ref 37–47)
HDLC SERPL-MCNC: 66 MG/DL
HGB BLD-MCNC: 13.8 G/DL (ref 12–16)
IMM GRANULOCYTES # BLD AUTO: 0.03 K/UL (ref 0–0.11)
IMM GRANULOCYTES NFR BLD AUTO: 0.6 % (ref 0–0.9)
LDLC SERPL CALC-MCNC: 62 MG/DL
LYMPHOCYTES # BLD AUTO: 1 K/UL (ref 1–4.8)
LYMPHOCYTES NFR BLD: 21.5 % (ref 22–41)
MCH RBC QN AUTO: 29.9 PG (ref 27–33)
MCHC RBC AUTO-ENTMCNC: 31.9 G/DL (ref 32.2–35.5)
MCV RBC AUTO: 93.9 FL (ref 81.4–97.8)
MICROALBUMIN UR-MCNC: <1.2 MG/DL
MICROALBUMIN/CREAT UR: NORMAL MG/G (ref 0–30)
MONOCYTES # BLD AUTO: 0.59 K/UL (ref 0–0.85)
MONOCYTES NFR BLD AUTO: 12.7 % (ref 0–13.4)
NEUTROPHILS # BLD AUTO: 2.82 K/UL (ref 1.82–7.42)
NEUTROPHILS NFR BLD: 60.7 % (ref 44–72)
NRBC # BLD AUTO: 0 K/UL
NRBC BLD-RTO: 0 /100 WBC (ref 0–0.2)
PLATELET # BLD AUTO: 266 K/UL (ref 164–446)
PMV BLD AUTO: 9.9 FL (ref 9–12.9)
POTASSIUM SERPL-SCNC: 4.6 MMOL/L (ref 3.6–5.5)
PROT SERPL-MCNC: 6.3 G/DL (ref 6–8.2)
RBC # BLD AUTO: 4.61 M/UL (ref 4.2–5.4)
SODIUM SERPL-SCNC: 142 MMOL/L (ref 135–145)
T4 FREE SERPL-MCNC: 1.14 NG/DL (ref 0.93–1.7)
TRIGL SERPL-MCNC: 66 MG/DL (ref 0–149)
TSH SERPL DL<=0.005 MIU/L-ACNC: 1.58 UIU/ML (ref 0.38–5.33)
VIT B12 SERPL-MCNC: 614 PG/ML (ref 211–911)
WBC # BLD AUTO: 4.7 K/UL (ref 4.8–10.8)

## 2024-05-13 PROCEDURE — 78452 HT MUSCLE IMAGE SPECT MULT: CPT | Mod: 26 | Performed by: INTERNAL MEDICINE

## 2024-05-13 PROCEDURE — 93018 CV STRESS TEST I&R ONLY: CPT | Performed by: INTERNAL MEDICINE

## 2024-05-13 RX ORDER — AMINOPHYLLINE 25 MG/ML
100 INJECTION, SOLUTION INTRAVENOUS
Status: DISCONTINUED | OUTPATIENT
Start: 2024-05-13 | End: 2024-05-14 | Stop reason: HOSPADM

## 2024-05-13 RX ORDER — REGADENOSON 0.08 MG/ML
INJECTION, SOLUTION INTRAVENOUS
Status: COMPLETED
Start: 2024-05-13 | End: 2024-05-13

## 2024-05-13 RX ORDER — REGADENOSON 0.08 MG/ML
0.4 INJECTION, SOLUTION INTRAVENOUS ONCE
Status: COMPLETED | OUTPATIENT
Start: 2024-05-13 | End: 2024-05-13

## 2024-05-13 RX ADMIN — REGADENOSON 0.4 MG: 0.08 INJECTION, SOLUTION INTRAVENOUS at 10:44

## 2024-05-14 ENCOUNTER — NON-PROVIDER VISIT (OUTPATIENT)
Dept: CARDIOLOGY | Facility: MEDICAL CENTER | Age: 74
End: 2024-05-14
Attending: INTERNAL MEDICINE
Payer: MEDICARE

## 2024-05-14 DIAGNOSIS — R00.2 PALPITATIONS: ICD-10-CM

## 2024-05-14 NOTE — PROGRESS NOTES
Patient enrolled in the 14 day o Holter monitoring program per Demetris Avery MD.  >Office hook-up, serial # [id number].  >Currently pending EOS.

## 2024-05-15 ENCOUNTER — PATIENT OUTREACH (OUTPATIENT)
Dept: ONCOLOGY | Facility: MEDICAL CENTER | Age: 74
End: 2024-05-15
Payer: MEDICARE

## 2024-05-15 NOTE — PROGRESS NOTES
Referral received, call placed to patient.  No answer and automated message saying no voice messaging has been set up yet.

## 2024-05-17 ENCOUNTER — OFFICE VISIT (OUTPATIENT)
Dept: SLEEP MEDICINE | Facility: MEDICAL CENTER | Age: 74
End: 2024-05-17
Attending: INTERNAL MEDICINE
Payer: MEDICARE

## 2024-05-17 VITALS
DIASTOLIC BLOOD PRESSURE: 66 MMHG | OXYGEN SATURATION: 96 % | HEART RATE: 65 BPM | HEIGHT: 63 IN | BODY MASS INDEX: 31.18 KG/M2 | SYSTOLIC BLOOD PRESSURE: 102 MMHG | WEIGHT: 176 LBS

## 2024-05-17 DIAGNOSIS — D3A.090 BENIGN CARCINOID TUMOR OF LUNG: ICD-10-CM

## 2024-05-17 DIAGNOSIS — R06.09 DYSPNEA ON EXERTION: ICD-10-CM

## 2024-05-17 DIAGNOSIS — E66.9 OBESITY (BMI 30-39.9): ICD-10-CM

## 2024-05-17 PROCEDURE — 94761 N-INVAS EAR/PLS OXIMETRY MLT: CPT | Performed by: INTERNAL MEDICINE

## 2024-05-17 PROCEDURE — 3074F SYST BP LT 130 MM HG: CPT | Performed by: INTERNAL MEDICINE

## 2024-05-17 PROCEDURE — 99215 OFFICE O/P EST HI 40 MIN: CPT | Mod: 25 | Performed by: INTERNAL MEDICINE

## 2024-05-17 PROCEDURE — 3078F DIAST BP <80 MM HG: CPT | Performed by: INTERNAL MEDICINE

## 2024-05-17 ASSESSMENT — ENCOUNTER SYMPTOMS: SHORTNESS OF BREATH: 1

## 2024-05-17 ASSESSMENT — FIBROSIS 4 INDEX: FIB4 SCORE: 1.47

## 2024-05-17 NOTE — PROCEDURES
Multi-Ox Readings  Multi Ox #1 Room air   O2 sat % at rest 96   O2 sat % on exertion 94   O2 sat average on exertion     Multi Ox #2     O2 sat % at rest     O2 sat % on exertion     O2 sat average on exertion       Oxygen Use     Oxygen Frequency     Duration of need     Is the patient mobile within the home?     CPAP Use?     BIPAP Use?     Servo Titration        No desaturations on multi oximetry test on room air today in clinic.     I, Ashish Gibbons M.D. am the author of this note (walk test interpretation).  __________  Ashish Gibbons MD  Pulmonary and Critical Care Medicine  [facility]

## 2024-05-17 NOTE — PROGRESS NOTES
Pulmonary Clinic Note    Chief Complaint:  Chief Complaint   Patient presents with    Follow-Up     Last seen 11/29/23 w/ Dr. Salinas for Lung Nodules Enlarging     Results     CT-PET 3/28/24     HPI:   Cristela Alan is a very pleasant 73 y.o. female returns to the pulmonary clinic for follow-up of multiple pulmonary nodules.  Last seen for initial consultation with Dr. Salinas in 11/2023.  She has a past medical history of paroxysmal atrial fibrillation on Pradaxa, osteopenia, type 2 diabetes, carotid artery stenosis, history of TIA in 2000, history of endometrial cancer.  She has a very slowly growing nodule in the right upper lobe, with interval growth since 2017 from 9 to 13 mm.  She is asymptomatic.  On her last visit with Dr. Salinas, patient stated she did not want to pursue biopsy due to multiple core abilities/allergies.  A dotatate PET scan was performed which showed avidity in this region consistent with neuroendocrine tumor.  There is also focal uptake in the T11 vertebral body.  Imaging was reviewed at tumor board conference on 5/16, vertebral body uptake was felt to represent chronic inflammation, considering patient's values/preferences and slow growth of this lesion, the collective recommendation of the board was radiographic surveillance.    Subjectively, Cristela reports she continues to be very active.  She is constantly cleaning the house, did a huge amount of yard work this spring independently.  She does have some dyspnea on exertion which she just recently noticed.  She does have some tenderness in her lower lumbar area, no tenderness over area of concern noted on dotatate PET.  Weight stable.          Past Medical History:   Diagnosis Date    Allergic rhinitis     Anesthesia     hypotension    Atrial fibrillation (HCC)     Cancer (HCC) 2014    endometrial    CATARACT     bilateral lens implants    Cholesterol blood decreased     Cough     Dental disorder     dentures    Dermatitis, contact      "Dyslipidemia 06/30/2016    Elevated alkaline phosphatase level 09/20/2021      Ref. Range 3/18/2021 08:22 9/3/2021 08:23  Alkaline Phosphatase Latest Ref Range: 30 - 99 U/L 85 104 (H)   New finding of mildly elevated alkaline phosphatase.  She has history of endometrial cancer.  She has osteopenia with elevated FRAX.  No known primary liver disease.    Encounter for general adult medical examination without abnormal findings 12/02/2014    [facility]ia for GI No flu shots- egg allergies  Declines all vaccines- had reaction to tdap    Endometrium cancer (HCC)     Hyperlipidemia     Hypertension     Impaired glucose tolerance     Macular degeneration     Obesity     Pneumonia     had PNU 35 years ago    Prolapsed uterus 08/2014    current issue    Pulmonary nodule 11/09/2016    No history of tobacco use CT 9/16, 12/16- no change-6/17- ? New nodule>>PET CT-10/17 neg     Shortness of breath     Stroke (HCC)     \"mini strokes\" TIA, no residual    TIA (transient ischemic attack)     Unspecified hemorrhagic conditions     NOSE BLEEDS/on Plavix    Vitamin D deficiency     Wheezing        Past Surgical History:   Procedure Laterality Date    HYSTERECTOMY ROBOTIC XI  8/28/2014    Performed by Pantera Wylie M.D. at SURGERY [facility]    LYMPH NODE DISSECTION ROBOTIC XI  8/28/2014    Performed by Pantera Wylie M.D. at SURGERY [facility]    CATARACT PHACO WITH IOL  8/19/2010    Performed by DAVE AGUILAR at SURGERY SAME DAY [facility]    CATARACT PHACO WITH IOL  8/5/2010    Performed by DAVE AGUILAR at SURGERY SAME DAY [facility]    HIP ARTHROSCOPY  5/12/08    Performed by ZINA HOLLIS at SURGERY [facility]    ACETABULAR OSTEOTOMY  5/12/08    Performed by ZINA HOLLIS at SURGERY [facility]    ORTHOPEDIC OSTEOTOMY  5/12/08    Performed by ZINA HOLLIS at SURGERY [facility]    NASAL POLYPECTOMY  1994    \"removal of papilloma\"    ABDOMINAL HYSTERECTOMY TOTAL      EYE SURGERY         Social " History     Socioeconomic History    Marital status:      Spouse name: Not on file    Number of children: Not on file    Years of education: Not on file    Highest education level: Not on file   Occupational History    Not on file   Tobacco Use    Smoking status: Never    Smokeless tobacco: Never    Tobacco comments:     continued abstinence   Vaping Use    Vaping status: Never Used   Substance and Sexual Activity    Alcohol use: No    Drug use: No    Sexual activity: Not Currently   Other Topics Concern    Not on file   Social History Narrative    She is  to Art for 49 years, they met at a [employer]. She has a daughter Nikki (43, [place]) and she has 2 kids. She retired in her late 60's. 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     Social Determinants of Health     Financial Resource Strain: Not on file   Food Insecurity: Not on file   Transportation Needs: Not on file   Physical Activity: Not on file   Stress: Not on file   Social Connections: Not on file   Intimate Partner Violence: Not on file   Housing Stability: Not on file          Family History   Problem Relation Age of Onset    Hyperlipidemia Mother     Hypertension Mother     Cancer Mother     No Known Problems Father     Heart Disease Sister     Alcohol/Drug Sister     Hypertension Sister     Heart Attack Sister     Cancer Sister 66        ? breast cancer as well    Hypertension Sister     Hyperlipidemia Sister     Cancer Other     Hypertension Other     Diabetes Maternal Grandmother         elderly    Other Sister         suicide     Stroke Neg Hx        Current Outpatient Medications on File Prior to Visit   Medication Sig Dispense Refill    dabigatran (PRADAXA) 150 MG Cap capsule TAKE 1 CAPSULE BY MOUTH TWICE A DAY* Capsule 3    lisinopril (PRINIVIL) 5 MG Tab TAKE 1 TABLET BY MOUTH EVERY  Tablet 3    atorvastatin (LIPITOR) 40 MG Tab TAKE 1 TABLET BY MOUTH EVERY DAY IN THE EVENING 100 Tablet 3    metoprolol  "tartrate (LOPRESSOR) 25 MG Tab TAKE 1 TABLET BY MOUTH TWICE A  Tablet 3    glucose blood strip 1 Strip by Other route every day. 100 Strip 3    Lancets Lancets order: Lancets for Abbott Freestyle Lite meter. Sig: use once daily and prn ssx high or low sugar. #100 RF x 3 100 Each 3    Multiple Vitamins-Minerals (PRESERVISION AREDS PO) Take  by mouth.      Cholecalciferol (VITAMIN D HIGH POTENCY PO) Take 2,000 mg by mouth every day.      Polyethylene Glycol 400 (BLINK TEARS OP) by Ophthalmic route every day.      Calcium 600 MG TABS Take 1 Tablet by mouth every morning. Taking 2000mg  Indications: per pt is taking 500 mg      Polyethyl Glycol-Propyl Glycol (SYSTANE OP) Place 1 Drop in both eyes every bedtime. Indications: **OTC**       No current facility-administered medications on file prior to visit.       Allergies: Fentanyl; Flu virus vaccine; Versed; Bee; Codeine; Eggs; Lidocaine hcl; Other drug; Penicillins; Soap; Tape; and Xylocaine [lidocaine hcl, local anesth.]      ROS:   Review of Systems   Respiratory:  Positive for shortness of breath (with exertin).    All other systems reviewed and are negative.    Vitals:  /66 (BP Location: Left arm, Patient Position: Sitting, BP Cuff Size: Adult)   Pulse 65   Ht 1.6 m (5' 3\")   Wt 79.8 kg (176 lb)   SpO2 96%     Physical Exam:  Physical Exam  Vitals and nursing note reviewed.   Constitutional:       General: She is not in acute distress.     Appearance: Normal appearance. She is well-developed. She is not diaphoretic.      Comments: Very pleasant   Eyes:      General: No scleral icterus.        Right eye: No discharge.         Left eye: No discharge.      Conjunctiva/sclera: Conjunctivae normal.      Pupils: Pupils are equal, round, and reactive to light.   Neck:      Thyroid: No thyromegaly.      Vascular: No JVD.   Cardiovascular:      Rate and Rhythm: Normal rate and regular rhythm.      Heart sounds: Normal heart sounds. No murmur heard.     No " gallop.   Pulmonary:      Effort: Pulmonary effort is normal. No respiratory distress.      Breath sounds: Normal breath sounds. No wheezing or rales.   Abdominal:      Palpations: Abdomen is soft.   Musculoskeletal:      Thoracic back: No tenderness.      Lumbar back: Tenderness (L4-:5) present.   Lymphadenopathy:      Cervical: No cervical adenopathy.   Skin:     General: Skin is warm.      Capillary Refill: Capillary refill takes less than 2 seconds.      Findings: No erythema or rash.   Neurological:      Mental Status: She is alert and oriented to person, place, and time.      Cranial Nerves: No cranial nerve deficit.      Sensory: No sensory deficit.      Motor: No abnormal muscle tone.   Psychiatric:         Behavior: Behavior normal.       Laboratory Data:  PFTs as reviewed by me personally show:  See HPI    Imaging as reviewed by me personally show:    See HPI    Assessment/Plan:    1. Benign carcinoid tumor of lung  CT-CHEST (THORAX) W/O      2. Dyspnea on exertion  Multiple Oximetry        Imaging was reviewed at tumor board conference on 5/16, imaging and behavior of nodule consistent with carcinoid. Very slow growth, < 1cm in 8 years, is reassuring for benign behavior. Vertebral body uptake was felt to represent chronic inflammation and she is asymptomatic. Collective recommendation of the board was radiographic surveillance and no oncology involvement needed at this point. Plan for repeat CT chest 1 year.  No desaturations on multi oximetry test today in clinic.  No prior smoking.  Continues to be very active with yard work, housework, mMRC 0/1.  Continue to monitor.  Return in about 1 year (around 5/17/2025).     This note was generated using voice recognition software which has a chance of producing errors of grammar and possibly content.  I have made every reasonable attempt to find and correct any obvious errors, but it should be expected that some may not be found prior to finalization of this  note.    Time spent in record review prior to patient arrival, reviewing results, and in face-to-face encounter totaled 42 min, excluding any procedures if performed.  __________  Ashish Gibbons MD  Pulmonary and Critical Care Medicine  [facility]

## 2024-05-20 NOTE — PROGRESS NOTES
Pt presented in tumor board and followed up with Dr Gibbons.  Benign lung plan for surveillance with follow up CT in 1 year.  No need for navigation at this time.   [facility] may also call Recovery Room (PACU) 24/7 @ [phone]/[facility]

## 2024-05-21 ENCOUNTER — APPOINTMENT (OUTPATIENT)
Dept: SLEEP MEDICINE | Facility: MEDICAL CENTER | Age: 74
End: 2024-05-21
Attending: INTERNAL MEDICINE
Payer: MEDICARE

## 2024-05-28 ENCOUNTER — APPOINTMENT (OUTPATIENT)
Dept: HEMATOLOGY ONCOLOGY | Facility: MEDICAL CENTER | Age: 74
End: 2024-05-28
Payer: MEDICARE

## 2024-05-31 ENCOUNTER — TELEPHONE (OUTPATIENT)
Dept: CARDIOLOGY | Facility: MEDICAL CENTER | Age: 74
End: 2024-05-31
Payer: MEDICARE

## 2024-05-31 ENCOUNTER — PATIENT MESSAGE (OUTPATIENT)
Dept: CARDIOLOGY | Facility: MEDICAL CENTER | Age: 74
End: 2024-05-31
Payer: MEDICARE

## 2024-05-31 DIAGNOSIS — R00.2 PALPITATIONS: ICD-10-CM

## 2024-05-31 DIAGNOSIS — I10 ESSENTIAL HYPERTENSION: ICD-10-CM

## 2024-05-31 RX ORDER — METOPROLOL TARTRATE 50 MG/1
50 TABLET, FILM COATED ORAL 2 TIMES DAILY
Qty: 200 TABLET | Refills: 2 | Status: SHIPPED | OUTPATIENT
Start: 2024-05-31

## 2024-05-31 NOTE — TELEPHONE ENCOUNTER
Phone Number Called: [phone]    Call outcome: Did not leave a detailed message. Requested patient to call back.    Message: Called to discuss JIs recommendations. Pt active on MyChart, will send [employer] message for pt to review and respond to or call with questions.     Medication updated.

## 2024-05-31 NOTE — TELEPHONE ENCOUNTER
Please notify the patient of Zio Patch showing premature atrial contractions and atrial tachycardia, however, no evidence of atrial fibrillation. Please increase her metoprolol to 50 mg BID for her palpitations.  Thank you.  CESAR

## 2024-05-31 NOTE — TELEPHONE ENCOUNTER
SHAYNA EOS to LINDSEY's nurse, Genesis, on 5/31/2024  Symptomatic SVT  Preliminary monitor findings:  53 runs of SVT,  with an avg rate of 123bpm  Sinus rhythm,  with an avg rate of 68bpm  6 patient events corresponding to the following:  SVT   SR 62-80  Ventricular bigeminy  SVE(s)  VE(s)

## 2024-07-02 ENCOUNTER — HOSPITAL ENCOUNTER (OUTPATIENT)
Dept: RADIOLOGY | Facility: MEDICAL CENTER | Age: 74
End: 2024-07-02
Attending: INTERNAL MEDICINE
Payer: MEDICARE

## 2024-07-02 DIAGNOSIS — Z12.31 VISIT FOR SCREENING MAMMOGRAM: ICD-10-CM

## 2024-07-02 PROCEDURE — 77063 BREAST TOMOSYNTHESIS BI: CPT

## 2024-07-08 ENCOUNTER — APPOINTMENT (OUTPATIENT)
Dept: MEDICAL GROUP | Facility: PHYSICIAN GROUP | Age: 74
End: 2024-07-08
Payer: MEDICARE

## 2024-07-08 VITALS
HEART RATE: 66 BPM | OXYGEN SATURATION: 94 % | TEMPERATURE: 98 F | WEIGHT: 177.8 LBS | SYSTOLIC BLOOD PRESSURE: 118 MMHG | HEIGHT: 63 IN | DIASTOLIC BLOOD PRESSURE: 62 MMHG | BODY MASS INDEX: 31.5 KG/M2

## 2024-07-08 DIAGNOSIS — D3A.8 NEUROENDOCRINE TUMOR: ICD-10-CM

## 2024-07-08 DIAGNOSIS — I47.10 PAROXYSMAL SVT (SUPRAVENTRICULAR TACHYCARDIA) (HCC): ICD-10-CM

## 2024-07-08 DIAGNOSIS — E78.00 PURE HYPERCHOLESTEROLEMIA: ICD-10-CM

## 2024-07-08 DIAGNOSIS — I10 ESSENTIAL (PRIMARY) HYPERTENSION: ICD-10-CM

## 2024-07-08 DIAGNOSIS — E11.9 TYPE 2 DIABETES MELLITUS WITHOUT COMPLICATION, WITHOUT LONG-TERM CURRENT USE OF INSULIN (HCC): ICD-10-CM

## 2024-07-08 PROCEDURE — G2211 COMPLEX E/M VISIT ADD ON: HCPCS | Performed by: INTERNAL MEDICINE

## 2024-07-08 PROCEDURE — 3074F SYST BP LT 130 MM HG: CPT | Performed by: INTERNAL MEDICINE

## 2024-07-08 PROCEDURE — 99214 OFFICE O/P EST MOD 30 MIN: CPT | Performed by: INTERNAL MEDICINE

## 2024-07-08 PROCEDURE — 3078F DIAST BP <80 MM HG: CPT | Performed by: INTERNAL MEDICINE

## 2024-07-08 RX ORDER — LANCETS 30 GAUGE
EACH MISCELLANEOUS
Qty: 100 EACH | Refills: 3 | Status: SHIPPED | OUTPATIENT
Start: 2024-07-08

## 2024-07-08 ASSESSMENT — FIBROSIS 4 INDEX: FIB4 SCORE: 1.47

## 2024-07-10 ENCOUNTER — HOSPITAL ENCOUNTER (OUTPATIENT)
Dept: CARDIOLOGY | Facility: MEDICAL CENTER | Age: 74
End: 2024-07-10
Attending: INTERNAL MEDICINE
Payer: MEDICARE

## 2024-07-10 DIAGNOSIS — R00.2 PALPITATIONS: ICD-10-CM

## 2024-07-10 DIAGNOSIS — I48.0 PAROXYSMAL ATRIAL FIBRILLATION (HCC): ICD-10-CM

## 2024-07-10 DIAGNOSIS — I10 ESSENTIAL (PRIMARY) HYPERTENSION: ICD-10-CM

## 2024-07-10 PROCEDURE — 93306 TTE W/DOPPLER COMPLETE: CPT

## 2024-07-15 ENCOUNTER — PATIENT MESSAGE (OUTPATIENT)
Dept: CARDIOLOGY | Facility: MEDICAL CENTER | Age: 74
End: 2024-07-15
Payer: MEDICARE

## 2024-07-21 LAB
LV EJECT FRACT  99904: 60
LV EJECT FRACT MOD 2C 99903: 63.03
LV EJECT FRACT MOD 4C 99902: 53.33
LV EJECT FRACT MOD BP 99901: 59.51

## 2024-07-21 PROCEDURE — 93306 TTE W/DOPPLER COMPLETE: CPT | Mod: 26 | Performed by: INTERNAL MEDICINE

## 2024-08-07 DIAGNOSIS — E11.9 TYPE 2 DIABETES MELLITUS WITHOUT COMPLICATION, WITHOUT LONG-TERM CURRENT USE OF INSULIN (HCC): ICD-10-CM

## 2024-08-07 RX ORDER — BLOOD-GLUCOSE METER
KIT MISCELLANEOUS
Qty: 100 STRIP | Refills: 3 | Status: SHIPPED | OUTPATIENT
Start: 2024-08-07

## 2024-08-07 RX ORDER — BLOOD-GLUCOSE METER
KIT MISCELLANEOUS
Qty: 100 STRIP | Refills: 3 | Status: SHIPPED | OUTPATIENT
Start: 2024-08-07 | End: 2024-08-07

## 2024-08-08 NOTE — TELEPHONE ENCOUNTER
Received request via: Pharmacy    Was the patient seen in the last year in this department? Yes    Does the patient have an active prescription (recently filled or refills available) for medication(s) requested? No    Pharmacy Name: [facility]    Does the patient have residential Plus and need 100-day supply? (This applies to ALL medications) Yes, quantity updated to 100 days

## 2024-08-12 DIAGNOSIS — E11.9 TYPE 2 DIABETES MELLITUS WITHOUT COMPLICATION, WITHOUT LONG-TERM CURRENT USE OF INSULIN (HCC): ICD-10-CM

## 2024-08-12 RX ORDER — LANCETS 28 GAUGE
EACH MISCELLANEOUS
Qty: 100 EACH | Refills: 3 | Status: SHIPPED | OUTPATIENT
Start: 2024-08-12

## 2024-08-12 NOTE — TELEPHONE ENCOUNTER
Received request via: Pharmacy    Was the patient seen in the last year in this department? Yes    Does the patient have an active prescription (recently filled or refills available) for medication(s) requested? No    Pharm[facility] Name[facility]    Does the patient have USP Plus and need 100-day supply? (This applies to ALL medications) Yes, quantity updated to 100 days

## 2024-08-23 ENCOUNTER — OFFICE VISIT (OUTPATIENT)
Dept: CARDIOLOGY | Facility: MEDICAL CENTER | Age: 74
End: 2024-08-23
Attending: INTERNAL MEDICINE
Payer: MEDICARE

## 2024-08-23 VITALS
WEIGHT: 174 LBS | DIASTOLIC BLOOD PRESSURE: 62 MMHG | HEART RATE: 71 BPM | HEIGHT: 63 IN | RESPIRATION RATE: 16 BRPM | BODY MASS INDEX: 30.83 KG/M2 | OXYGEN SATURATION: 96 % | SYSTOLIC BLOOD PRESSURE: 106 MMHG

## 2024-08-23 DIAGNOSIS — E78.00 PURE HYPERCHOLESTEROLEMIA: ICD-10-CM

## 2024-08-23 DIAGNOSIS — I10 ESSENTIAL (PRIMARY) HYPERTENSION: ICD-10-CM

## 2024-08-23 DIAGNOSIS — Z79.01 CHRONIC ANTICOAGULATION: ICD-10-CM

## 2024-08-23 DIAGNOSIS — I48.0 PAROXYSMAL ATRIAL FIBRILLATION (HCC): ICD-10-CM

## 2024-08-23 DIAGNOSIS — I47.19 ATRIAL TACHYCARDIA (HCC): ICD-10-CM

## 2024-08-23 PROCEDURE — 99213 OFFICE O/P EST LOW 20 MIN: CPT | Performed by: INTERNAL MEDICINE

## 2024-08-23 ASSESSMENT — ENCOUNTER SYMPTOMS
NERVOUS/ANXIOUS: 0
BLURRED VISION: 0
EYES NEGATIVE: 1
CHILLS: 0
GASTROINTESTINAL NEGATIVE: 1
CONSTITUTIONAL NEGATIVE: 1
DEPRESSION: 0
HEADACHES: 0
PALPITATIONS: 0
CLAUDICATION: 0
DIZZINESS: 0
DOUBLE VISION: 0
ABDOMINAL PAIN: 0
NEUROLOGICAL NEGATIVE: 1
CARDIOVASCULAR NEGATIVE: 1
SHORTNESS OF BREATH: 0
VOMITING: 0
FEVER: 0
BRUISES/BLEEDS EASILY: 0
NAUSEA: 0
COUGH: 0
MUSCULOSKELETAL NEGATIVE: 1
FOCAL WEAKNESS: 0
PSYCHIATRIC NEGATIVE: 1
WEAKNESS: 0
MYALGIAS: 0
WEIGHT LOSS: 0
RESPIRATORY NEGATIVE: 1

## 2024-08-23 ASSESSMENT — FIBROSIS 4 INDEX: FIB4 SCORE: 1.47

## 2024-08-23 NOTE — PROGRESS NOTES
Chief Complaint   Patient presents with    Hypertension     F/V Dx: Essential (primary) hypertension    Atrial Fibrillation     F/V Dx: Paroxysmal atrial fibrillation (HCC)    Hyperlipidemia       Subjective     Cristela Alan is a 73 y.o. female who presents today for follow up of hypertension and hyperlipidemia, atrial tachycardia, atrial fibrillation on chronic anticoagulation therapy, prior transient ischemic attack.    Since the patient's last visit on 04/29/24, she has been doing well clinically from cardiac standpoint. She admit to improvement of palpitations with increase of beta blockade therapy. She denies fatigue, chest pain, shortness of breath, palpitations, lower extremity edema, dizziness or syncope. She was diagnosed with neuroendocrine tumor. She is active gardening.     Past Medical History:   Diagnosis Date    Allergic rhinitis     Anesthesia     hypotension    Atrial fibrillation (HCC)     Cancer (HCC) 2014    endometrial    CATARACT     bilateral lens implants    Cholesterol blood decreased     Cough     Dental disorder     dentures    Dermatitis, contact     Dyslipidemia 06/30/2016    Elevated alkaline phosphatase level 09/20/2021      Ref. Range 3/18/2021 08:22 9/3/2021 08:23  Alkaline Phosphatase Latest Ref Range: 30 - 99 U/L 85 104 (H)   New finding of mildly elevated alkaline phosphatase.  She has history of endometrial cancer.  She has osteopenia with elevated FRAX.  No known primary liver disease.    Encounter for general adult medical examination without abnormal findings 12/02/2014    Alexia for GI No flu shots- egg allergies  Declines all vaccines- had reaction to tdap    Endometrium cancer (HCC)     Hyperlipidemia     Hypertension     Impaired glucose tolerance     Macular degeneration     Obesity     Pneumonia     had PNU 35 years ago    Prolapsed uterus 08/2014    current issue    Pulmonary nodule 11/09/2016    No history of tobacco use CT 9/16, 12/16- no change-6/17- ? New  "nodule>>PET CT-10/17 neg     Shortness of breath     Stroke (HCC)     \"mini strokes\" TIA, no residual    TIA (transient ischemic attack)     Unspecified hemorrhagic conditions     NOSE BLEEDS/on Plavix    Vitamin D deficiency     Wheezing      Past Surgical History:   Procedure Laterality Date    HYSTERECTOMY ROBOTIC XI  8/28/2014    Performed by Pantera Wylie M.D. at SURGERY [facility] ORS    LYMPH NODE DISSECTION ROBOTIC XI  8/28/2014    Performed by Pantera Wylie M.D. at SURGERY [facility] ORS    CATARACT PHACO WITH IOL  8/19/2010    Performed by DAVE AGUILAR at SURGERY SAME DAY [facility] ORS    CATARACT PHACO WITH IOL  8/5/2010    Performed by DAVE AGUILAR at SURGERY SAME DAY [facility] ORS    HIP ARTHROSCOPY  5/12/08    Performed by ZINA HOLLIS at SURGERY [facility] ORS    ACETABULAR OSTEOTOMY  5/12/08    Performed by ZINA HOLLIS at SURGERY [facility] ORS    ORTHOPEDIC OSTEOTOMY  5/12/08    Performed by ZINA HOLLIS at SURGERY [facility] ORS    NASAL POLYPECTOMY  1994    \"removal of papilloma\"    ABDOMINAL HYSTERECTOMY TOTAL      EYE SURGERY       Family History   Problem Relation Age of Onset    Hyperlipidemia Mother     Hypertension Mother     Cancer Mother     No Known Problems Father     Heart Disease Sister     Alcohol/Drug Sister     Hypertension Sister     Heart Attack Sister     Cancer Sister 66        ? breast cancer as well    Hypertension Sister     Hyperlipidemia Sister     Cancer Other     Hypertension Other     Diabetes Maternal Grandmother         elderly    Other Sister         suicide     Stroke Neg Hx      Social History     Socioeconomic History    Marital status:      Spouse name: Not on file    Number of children: Not on file    Years of education: Not on file    Highest education level: Not on file   Occupational History    Not on file   Tobacco Use    Smoking status: Never    Smokeless tobacco: Never    Tobacco comments:     continued abstinence   Vaping Use " "   Vaping status: Never Used   Substance and Sexual Activity    Alcohol use: No    Drug use: No    Sexual activity: Not Currently   Other Topics Concern    Not on file   Social History Narrative    She is  to Art for 49 years, they met at a [employer] r[employer]. She has a daughter Nikki (43, Tommy) and she has 2 kids. She retired in her late 60's. 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     Social Determinants of Health     Financial Resource Strain: Not on file   Food Insecurity: Not on file   Transportation Needs: Not on file   Physical Activity: Not on file   Stress: Not on file   Social Connections: Not on file   Intimate Partner Violence: Not on file   Housing Stability: Not on file     Allergies   Allergen Reactions    Fentanyl Anaphylaxis    Flu Virus Vaccine     Versed Shortness of Breath and Vomiting    Bee      And  spider--- tongue swelling    Codeine      Increased heart rate    Eggs Vomiting    Lidocaine Hcl      \"severe drop in BP\"    Other Drug      Novocaine hypotension ALL \"MELISA\"    Penicillins      Increased heart rate    Soap Rash     Laundry Tide soap, Dove and Ivory soap    Tape Rash     Paper tape OK    Xylocaine [Lidocaine Hcl, Local Anesth.]      All \"MELISA\" hypotension     (Medications reviewed.)  Outpatient Encounter Medications as of 8/23/2024   Medication Sig Dispense Refill    FreeStyle Lancets Misc USE ONCE DAILY AND AS NEEDED FOR SYMTOMS OF HIGH OR LOW SUGAR 100 Each 3    FREESTYLE LITE strip USE 1 STRIP TO TEST BLOOD SUGARS EVERY  Strip 3    Lancets Lancets order: Lancets for Abbott Freestyle Lite meter. Sig: use one daily and prn ssx high or low sugar. #100 RF x 3 100 Each 3    metoprolol tartrate (LOPRESSOR) 50 MG Tab Take 1 Tablet by mouth 2 times a day. 200 Tablet 2    dabigatran (PRADAXA) 150 MG Cap capsule TAKE 1 CAPSULE BY MOUTH TWICE A DAY* Capsule 3    lisinopril (PRINIVIL) 5 MG Tab TAKE 1 TABLET BY MOUTH EVERY  Tablet 3    atorvastatin " "(LIPITOR) 40 MG Tab TAKE 1 TABLET BY MOUTH EVERY DAY IN THE EVENING 100 Tablet 3    Multiple Vitamins-Minerals (PRESERVISION AREDS PO) Take  by mouth.      Cholecalciferol (VITAMIN D HIGH POTENCY PO) Take 2,000 mg by mouth every day.      Polyethylene Glycol 400 (BLINK TEARS OP) by Ophthalmic route every day.      Calcium 600 MG TABS Take 1 Tablet by mouth every morning. Taking 2000mg  Indications: per pt is taking 500 mg      Polyethyl Glycol-Propyl Glycol (SYSTANE OP) Place 1 Drop in both eyes every bedtime. Indications: **OTC**       No facility-administered encounter medications on file as of 8/23/2024.     Review of Systems   Constitutional: Negative.  Negative for chills, fever, malaise/fatigue and weight loss.   HENT: Negative.  Negative for hearing loss.    Eyes: Negative.  Negative for blurred vision and double vision.   Respiratory: Negative.  Negative for cough and shortness of breath.    Cardiovascular: Negative.  Negative for chest pain, palpitations, claudication and leg swelling.   Gastrointestinal: Negative.  Negative for abdominal pain, nausea and vomiting.   Genitourinary: Negative.  Negative for dysuria and urgency.   Musculoskeletal: Negative.  Negative for joint pain and myalgias.   Skin: Negative.  Negative for itching and rash.   Neurological: Negative.  Negative for dizziness, focal weakness, weakness and headaches.   Endo/Heme/Allergies: Negative.  Does not bruise/bleed easily.   Psychiatric/Behavioral: Negative.  Negative for depression. The patient is not nervous/anxious.               Objective     /62 (BP Location: Left arm, Patient Position: Sitting, BP Cuff Size: Adult)   Pulse 71   Resp 16   Ht 1.6 m (5' 3\")   Wt 78.9 kg (174 lb)   SpO2 96%   BMI 30.82 kg/m²     Physical Exam  Constitutional:       Appearance: Normal appearance. She is well-developed and normal weight.   HENT:      Head: Normocephalic and atraumatic.   Neck:      Vascular: No JVD.   Cardiovascular:      " Rate and Rhythm: Normal rate and regular rhythm.      Heart sounds: Normal heart sounds.   Pulmonary:      Effort: Pulmonary effort is normal.      Breath sounds: Normal breath sounds.   Abdominal:      General: Bowel sounds are normal.      Palpations: Abdomen is soft.      Comments: No hepatosplenomegaly.   Musculoskeletal:         General: Normal range of motion.   Lymphadenopathy:      Cervical: No cervical adenopathy.   Skin:     General: Skin is warm and dry.   Neurological:      Mental Status: She is alert and oriented to person, place, and time.            CARDIAC STUDIES/PROCEDURES:     CAROTID ULTRASOUND (06/18/18)  Mild bilateral internal carotid artery stenosis (<50%).    ECHOCARDIOGRAM CONCLUSIONS (07/10/24)  Normal transthoracic echocardiogram   (study result reviewed)      ECHOCARDIOGRAM CONCLUSIONS (06/18/18)  Compared to the images of the prior study.   Normal left ventricular systolic function.  Left ventricular ejection fraction is visually estimated to be 75%.  Grade II diastolic dysfunction.  No significant valve abnormalities.      ECHOCARDIOGRAM CONCLUSIONS (12/05/12)  Technically difficult study  Normal left ventricular size, thickness, systolic function, and diastolic function.  Left ventricular ejection fraction is 55% to 60%.  Structurally normal mitral valve.  No stenosis or regurgitation seen.  Structurally normal aortic valve.  No stenosis or regurgitation seen.  Structurally normal tricuspid valve.  Trace tricuspid regurgitation.      EKG performed on (08/24/14) EKG shows normal sinus rhythm.  EKG performed on (07/30/14) EKG shows normal sinus rhythm.  EKG performed on (12/06/12) EKG shows normal sinus rhythm.  EKG performed on (12/05/12) EKG shows atrial fibrillation.     Laboratory results of (05/13/24) were reviewed. Cholesterol profile of 141/66/66/62 mg/dL noted.  Laboratory results of (09/25/23) Cholesterol profile of 146/87/61/68 mg/dL noted.   Laboratory results of (03/27/23)  Cholesterol profile of 144/90/63/63 mg/dL noted.  Laboratory results of (09/14/20) Cholesterol profile of 183/136/60/96 mg/dl noted.  Laboratory results of (05/30/19) Cholesterol profile of 179/132/56/97 noted.  Laboratory results of (06/12/17) Cholesterol profile of 169/137/54/88 noted.  Laboratory results of (09/14/16) Cholesterol profile of 151/103/55/75 noted.  Laboratory results of (06/01/15) Cholesterol profile of 190/150/54/106 noted.     MYOCARDIAL PERFUSION STUDY CONCLUSIONS (05/13/24)  NUCLEAR IMAGING INTERPRETATION  Normal Lexiscan myocardial perfusion study.  No evidence of ischemia or infarct.  SDS 0.  LVEF 81%.  No ischemic changes with Regadenoson.  No arrhythmias.  No chest pain.  ECG INTERPRETATION  Negative stress ECG for ischemia.  (study result reviewed)    MYOCARDIAL PERFUSION STUDY CONCLUSIONS (06/18/18)  No evidence of significant jeopardized viable myocardium or prior myocardial infarction.  Normal left ventricular size, ejection fraction, and wall motion.  ECG INTERPRETATION  Negative stress ECG for ischemia.     MPI CONCLUSIONS (06/07/06)  NORMAL MYOCARDIAL PERFUSION IMAGES WITH NO EVIDENCE OF SIGNIFICANT   ISCHEMIA OR INFARCTION.    ZIO REPORT (05/14/24-05/28/24)   Zio study showing predominately sinus rhythm with maximum rate of 179 bpm, minimum rate of 47 bpm, average of 68 bpm.   Atrial fibrillation: None.   Supraventricular tachycardia: 53 episodes of supraventricular tachycardia/atrial tachycardia with fastest interval lasting 7 beats with a max rate of 179 bpm with longest lasting 18 beats with average rate of 113 bpm noted.   Pauses: None.   Heart block: None.   Ventricular tachycardia: None.   Rare <1% burden of premature atrial contractions, rare couplets and triplets.   Rare <1% burden of premature ventricular contractions, rare couplets.    6 Patient events correlated with  premature atrial contractions and atrial tachycardia.     Assessment & Plan     1. Essential (primary)  hypertension        2. Pure hypercholesterolemia        3. Paroxysmal atrial fibrillation (HCC)        4. Chronic anticoagulation        5. Atrial tachycardia (HCC)            Medical Decision Making: Today's Assessment/Status/Plan:        Hypertension: She is a 73 year old female with hypertension and hyperlipidemia, atrial tachycardia, atrial fibrillation on chronic anticoagulation therapy, prior transient ischemic attack. Blood pressure is well controlled. We will continue with metoprolol, lisinopril.  Hyperlipidemia: She is doing well on statin therapy without myalgia symptoms. (Managed by primary care physician)  Atrial fibrillation on anticoagulation therapy (Pradaxa), atrial tachycardia: She is doing well on anticoagulation without palpitations. She was previously followed by Jose Garcia. She was planned to be referred to [facility], however, she wishes to wait 6 months.  History of trans-ischemic attack (2000): She remains clinically stable without any new neurological symptoms.  Health maintenance: She underwent surgery for uterine cancer (08/27/14). She is being evaluated for lung mass.     We will follow up in 6 months.    CC Temitope Mccullough

## 2024-08-30 ENCOUNTER — TELEPHONE (OUTPATIENT)
Dept: CARDIOLOGY | Facility: MEDICAL CENTER | Age: 74
End: 2024-08-30
Payer: MEDICARE

## 2024-08-30 DIAGNOSIS — I48.91 ATRIAL FIBRILLATION, UNSPECIFIED TYPE (HCC): ICD-10-CM

## 2024-08-30 RX ORDER — DABIGATRAN ETEXILATE 150 MG/1
150 CAPSULE ORAL 2 TIMES DAILY
Qty: 200 CAPSULE | Refills: 3 | Status: SHIPPED | OUTPATIENT
Start: 2024-08-30

## 2024-08-30 NOTE — TELEPHONE ENCOUNTER
Received ERX in Cardiology [facility], ran test claim and rejected for Prior Authorization is required. Prior Authorization for Dabigatran 150mg caps (Quantity: 180, Days: 90) has been submitted via Cover My Meds: Key ([id number])    Insurance: [employer] / [employer] D    Will follow up in 48-72 business hours.    18

## 2024-09-02 ENCOUNTER — PATIENT MESSAGE (OUTPATIENT)
Dept: CARDIOLOGY | Facility: MEDICAL CENTER | Age: 74
End: 2024-09-02
Payer: MEDICARE

## 2024-09-04 ENCOUNTER — TELEPHONE (OUTPATIENT)
Dept: CARDIOLOGY | Facility: MEDICAL CENTER | Age: 74
End: 2024-09-04
Payer: MEDICARE

## 2024-09-04 DIAGNOSIS — Z86.73 HISTORY OF TIA (TRANSIENT ISCHEMIC ATTACK): ICD-10-CM

## 2024-09-04 DIAGNOSIS — I48.0 HYPERCOAGULABLE STATE DUE TO PAROXYSMAL ATRIAL FIBRILLATION (HCC): ICD-10-CM

## 2024-09-04 DIAGNOSIS — D68.69 HYPERCOAGULABLE STATE DUE TO PAROXYSMAL ATRIAL FIBRILLATION (HCC): ICD-10-CM

## 2024-09-04 DIAGNOSIS — Z86.73 HISTORY OF CVA (CEREBROVASCULAR ACCIDENT): ICD-10-CM

## 2024-09-04 DIAGNOSIS — I48.91 ATRIAL FIBRILLATION, UNSPECIFIED TYPE (HCC): ICD-10-CM

## 2024-09-04 PROCEDURE — RXMED WILLOW AMBULATORY MEDICATION CHARGE: Performed by: INTERNAL MEDICINE

## 2024-09-04 NOTE — TELEPHONE ENCOUNTER
Prior Authorization for Dabigatran has been denied at this time due to patient not having tried / failed Eliquis or Xarelto. Discussed this with patient, Charge Nurse, and Provider, Provider would prefer Eliquis for this patient's anti-coagulation therapy for the foreseeable future.      Requested for new prescription for Eliquis to be delivered to the patient through our [facility] tomorrow morning, 09/05/24, to ensure there is no further gap in therapy.      Discussed cost with patient for Eliquis therapy with [facility]. Advised patient that she gets a discounted co-pay for her medications for 90 day supplies and her Eliquis would be $94 for 3 months delivered to her home, or $47 monthly if preferred. Patient stated that at this time, she would only be able to afford the monthly co-pay, as any increase from $47 she would not be able to afford. She stated that she will let me know if it becomes unaffordable for her at any point going forward, and we will discuss from there how we will proceed.      Due to patient filling her medications with our [facility], I will be able to keep track of her costs for each fill and we can address any barriers that come up going forward if / when they do.      Patient also has my direct phone number in case she has any questions / concerns.

## 2024-09-04 NOTE — TELEPHONE ENCOUNTER
I just spoke with Cristela regarding the status of her Pradaxa currently. Cristela stated that she has had issues with medications previously and does not want to change because she has a history of sensitivity to medications.    Cristela stated that JI can attest to this, and requested that she stay on Pradaxa since she has not had any reactions to the Pradaxa therapy since starting it in 2014.    Thank you!!

## 2024-09-04 NOTE — TELEPHONE ENCOUNTER
Discussed patient concerns with Dr Avery. He would like the patient trial Eliquis as this is the preferred medication. We will place order for Eliquis 5 MG BID per JI instructions. Allie to reach out to patient for cost discussion.

## 2024-09-04 NOTE — TELEPHONE ENCOUNTER
Nick Jin,    This patient is out of medication as of this morning, and I called her insurance to address the denial of her Pradaxa coverage.    Her insurance requires her trial either Xarelto and/or Eliquis in order for her to continue with Pradaxa therapy.    I have advised her insurance that Cristela has successfully been on Pradaxa therapy since 06/2014. I let the insurance know that changing/trialing her on something else could be unsuccessful, and the risk of changing her from something that has proven successful for her for 10 years seems to be more risk than beneficial for her clinically.    However, her insurance has sent a staff message to Dr. Garcia to advise of the requirement or to see if there is any knowledge of this patient trialing the formulary alternatives (Xarelto / Eliquis) because there is no documentation on the patients chart stating either of the alternatives have been tried / failed prior to the Pradaxa therapy.    I let the insurance know that Dr. Garcia is no longer with our clinic, and the Provider is Dr. Avery, as submitted with the PA / Appeal request and chart notes.     However, if there is additional information that Dr. Avery can provide that would indicate why the patient should stay on Pradaxa therapy, that would be greatly appreciated.    Thank you!!    Allie BUNCH  Rx Coordinator

## 2024-09-05 ENCOUNTER — PHARMACY VISIT (OUTPATIENT)
Dept: PHARMACY | Facility: MEDICAL CENTER | Age: 74
End: 2024-09-05
Payer: COMMERCIAL

## 2024-09-09 PROCEDURE — RXMED WILLOW AMBULATORY MEDICATION CHARGE: Performed by: INTERNAL MEDICINE

## 2024-09-12 PROCEDURE — RXMED WILLOW AMBULATORY MEDICATION CHARGE: Performed by: INTERNAL MEDICINE

## 2024-09-13 ENCOUNTER — PHARMACY VISIT (OUTPATIENT)
Dept: PHARMACY | Facility: MEDICAL CENTER | Age: 74
End: 2024-09-13
Payer: COMMERCIAL

## 2024-09-23 ENCOUNTER — TELEPHONE (OUTPATIENT)
Dept: CARDIOLOGY | Facility: MEDICAL CENTER | Age: 74
End: 2024-09-23
Payer: MEDICARE

## 2024-09-23 DIAGNOSIS — I48.91 ATRIAL FIBRILLATION, UNSPECIFIED TYPE (HCC): ICD-10-CM

## 2024-09-23 RX ORDER — DABIGATRAN ETEXILATE 150 MG/1
150 CAPSULE ORAL 2 TIMES DAILY
Qty: 200 CAPSULE | Refills: 3 | Status: SHIPPED | OUTPATIENT
Start: 2024-09-23

## 2024-09-23 NOTE — TELEPHONE ENCOUNTER
JI: Please advise if ok for pt to switch back to Pradaxa?     Insurance required her to trial Eliquis in order for her PA of Pradaxa to be continued. Cost is also significantly less.     Thank you

## 2024-09-23 NOTE — TELEPHONE ENCOUNTER
Nick Hurtado,    This patient has been trialing the Eliquis for 18 days now, and will be coming due for her refill. She was previously on Pradaxa (Dabigatran) and due to insurance requirements, she needed to trial the Eliquis before he PA for continuation would be approved at the time.     However, insurance had approved the Dabigatran continuation after the patient received her 30 days of Eliquis on 09/05/2024.     Patient has given it a fair trial so far for the Eliquis, and has noticed a few slight differences. She has noticed that she is bruising a little easier and her onset of bleeding (due to cat scratch) was a little quicker. Other than that there has not been a significant reaction or intolerance.     Due to the cost difference of Eliquis ($47 / 30 days) and Dabigatran ($24 / 90 days) she would like to go back to the Dabigatran if JI would be ok with her changing back to the Dabigatran since the insurance approved it until the end of the year again.    She will be due for her refill in less than 2 weeks, so I wanted to check if the change back was appropriate per LINDSEY, and if ok'd to change back, can we please have a renewed script sent into our [facility] so that I can get it ready and delivered when she is due?    Thank you!    Allie BUNCH  Rx Coordinator

## 2024-09-24 NOTE — TELEPHONE ENCOUNTER
Hi Dr. Avery,     I received the Pradaxa prescription in my prescription queue just now to release. I appreciate your assistance!    Thank you!    Allie BUNCH  Rx Coordinator

## 2024-09-30 PROCEDURE — RXMED WILLOW AMBULATORY MEDICATION CHARGE: Performed by: INTERNAL MEDICINE

## 2024-10-03 ENCOUNTER — PHARMACY VISIT (OUTPATIENT)
Dept: PHARMACY | Facility: MEDICAL CENTER | Age: 74
End: 2024-10-03
Payer: COMMERCIAL

## 2024-11-12 ENCOUNTER — APPOINTMENT (OUTPATIENT)
Dept: MEDICAL GROUP | Facility: PHYSICIAN GROUP | Age: 74
End: 2024-11-12
Payer: MEDICARE

## 2024-11-12 VITALS
WEIGHT: 173.7 LBS | BODY MASS INDEX: 30.78 KG/M2 | RESPIRATION RATE: 14 BRPM | TEMPERATURE: 97 F | HEIGHT: 63 IN | SYSTOLIC BLOOD PRESSURE: 120 MMHG | OXYGEN SATURATION: 97 % | DIASTOLIC BLOOD PRESSURE: 62 MMHG | HEART RATE: 70 BPM

## 2024-11-12 DIAGNOSIS — Z00.00 ENCOUNTER FOR SUBSEQUENT ANNUAL WELLNESS VISIT (AWV) IN MEDICARE PATIENT: Primary | ICD-10-CM

## 2024-11-12 DIAGNOSIS — R91.8 LUNG MASS: ICD-10-CM

## 2024-11-12 DIAGNOSIS — I10 ESSENTIAL (PRIMARY) HYPERTENSION: ICD-10-CM

## 2024-11-12 DIAGNOSIS — I48.0 HYPERCOAGULABLE STATE DUE TO PAROXYSMAL ATRIAL FIBRILLATION (HCC): ICD-10-CM

## 2024-11-12 DIAGNOSIS — E78.5 HYPERLIPIDEMIA, UNSPECIFIED HYPERLIPIDEMIA TYPE: ICD-10-CM

## 2024-11-12 DIAGNOSIS — D68.69 HYPERCOAGULABLE STATE DUE TO PAROXYSMAL ATRIAL FIBRILLATION (HCC): ICD-10-CM

## 2024-11-12 DIAGNOSIS — E55.9 VITAMIN D DEFICIENCY: ICD-10-CM

## 2024-11-12 DIAGNOSIS — E11.9 TYPE 2 DIABETES MELLITUS WITHOUT COMPLICATION, WITHOUT LONG-TERM CURRENT USE OF INSULIN (HCC): ICD-10-CM

## 2024-11-12 PROCEDURE — G0439 PPPS, SUBSEQ VISIT: HCPCS | Performed by: INTERNAL MEDICINE

## 2024-11-12 PROCEDURE — 3074F SYST BP LT 130 MM HG: CPT | Performed by: INTERNAL MEDICINE

## 2024-11-12 PROCEDURE — 3078F DIAST BP <80 MM HG: CPT | Performed by: INTERNAL MEDICINE

## 2024-11-12 RX ORDER — ATORVASTATIN CALCIUM 40 MG/1
40 TABLET, FILM COATED ORAL EVERY EVENING
Qty: 100 TABLET | Refills: 3 | Status: SHIPPED | OUTPATIENT
Start: 2024-11-12

## 2024-11-12 ASSESSMENT — FIBROSIS 4 INDEX: FIB4 SCORE: 1.484614977916180537

## 2024-11-12 ASSESSMENT — ACTIVITIES OF DAILY LIVING (ADL): BATHING_REQUIRES_ASSISTANCE: 0

## 2024-11-12 ASSESSMENT — ENCOUNTER SYMPTOMS: GENERAL WELL-BEING: FAIR

## 2024-11-12 ASSESSMENT — PATIENT HEALTH QUESTIONNAIRE - PHQ9: CLINICAL INTERPRETATION OF PHQ2 SCORE: 0

## 2024-11-12 NOTE — PROGRESS NOTES
Chief Complaint   Patient presents with    Annual Wellness Visit       HPI:  Cristela Alan is a 74 y.o. here for Medicare Annual Wellness Visit       History of Present Illness  The patient presents for a routine checkup.    She is currently on Pradaxa, which she reports as being more cost-effective than Eliquis. She experiences occasional episodes of atrial fibrillation, but these are not constant. Her metoprolol dosage was increased from 25 mg to 50 mg, which she reports as being effective. She follows with cardiology every 6 months.    She monitors her blood sugar levels twice a week and levels remain at goal. Diabetes currently well controlled on no pharmacotherapy.    She has noticed an increase in her cough compared to previous levels, plans to discuss with Dr. Gibbons at her next follow up.    Her last eye exam was conducted in 02/2024, and she has been advised to schedule another one between 11/2024 and early 12/2024.    Her last mammogram was performed on 07/02/2024.    She has an upcoming appointment with her surgical gynecology oncologist in 02/2025 and a scheduled appointment with Dr. Gibbons of pulmonary medicine in 05/2025.        Patient Active Problem List    Diagnosis Date Noted    Atrial tachycardia (HCC) 08/23/2024    Paroxysmal SVT (supraventricular tachycardia) (HCC) 07/08/2024    Neuroendocrine tumor 05/07/2024    Chronic anticoagulation 04/29/2024    Palpitations 04/29/2024    Bereavement 07/06/2023    POLST- DNR/DNI 01/05/2023    Osteopenia of left hip with elevated FRAX 03/23/2020    Diastolic dysfunction 03/23/2020    Type 2 diabetes mellitus without complication, without long-term current use of insulin (HCC) 03/23/2020    Obesity (BMI 30-39.9) 03/23/2020    Carotid artery stenosis 12/14/2018    Lung nodules, enlarging 11/09/2016    Macular degeneration 05/10/2016    Hernia of abdominal wall 05/10/2016    Family history of cancer 05/10/2016    Vitamin D deficiency 10/28/2015    Macular  degeneration, bilateral 10/28/2015    History of endometrial cancer 08/28/2014    Allergic rhinitis 05/13/2014    Hyperlipidemia 05/13/2014    Paroxysmal atrial fibrillation (HCC) 12/06/2012    Hypercoagulable state due to paroxysmal atrial fibrillation (HCC) 12/06/2012    Essential (primary) hypertension 12/06/2012    Transient ischemic attack 12/06/2012       Current Outpatient Medications   Medication Sig Dispense Refill    atorvastatin (LIPITOR) 40 MG Tab TAKE 1 TABLET BY MOUTH EVERY DAY IN THE EVENING 100 Tablet 3    dabigatran (PRADAXA) 150 MG Cap capsule Take 1 Capsule by mouth 2 times a day. 200 Capsule 3    FreeStyle Lancets Misc USE ONCE DAILY AND AS NEEDED FOR SYMTOMS OF HIGH OR LOW SUGAR 100 Each 3    glucose blood (FREESTYLE LITE) strip USE 1 STRIP TO TEST BLOOD SUGARS EVERY  Strip 3    Lancets Use once daily as needed for symptoms of high or low sugar 100 Each 3    metoprolol tartrate (LOPRESSOR) 50 MG Tab Take 1 Tablet by mouth 2 times a day. 200 Tablet 2    lisinopril (PRINIVIL) 5 MG Tab TAKE 1 TABLET BY MOUTH EVERY  Tablet 3    Multiple Vitamins-Minerals (PRESERVISION AREDS PO) Take  by mouth.      Polyethylene Glycol 400 (BLINK TEARS OP) by Ophthalmic route every day.      CALCIUM PO Take 1 Tablet by mouth every morning. Taking 2000mg  Indications: per pt is taking 500 mg      Polyethyl Glycol-Propyl Glycol (SYSTANE OP) Place 1 Drop in both eyes every bedtime. Indications: **OTC**      Cholecalciferol (VITAMIN D HIGH POTENCY PO) Take 2,000 mg by mouth every day.       No current facility-administered medications for this visit.          Current supplements as per medication list.     Allergies: Fentanyl; Flu virus vaccine; Versed; Bee; Codeine; Eggs; Lidocaine hcl; Other drug; Penicillins; Soap; Tape; and Xylocaine [lidocaine hcl, local anesth.]    Current social contact/activities: sees grandson when she can     She  reports that she has never smoked. She has never used smokeless  tobacco. She reports that she does not drink alcohol and does not use drugs.  Counseling given: Not Answered  Tobacco comments: continued abstinence      ROS:    Gait: Uses no assistive device  Ostomy: No  Other tubes: No  Amputations: No  Chronic oxygen use: No  Last eye exam: 2024  Wears hearing aids: No   : Reports urinary leakage during the last 6 months that has not interfered at all with their daily activities or sleep.    Screenin  Depression Screening  Little interest or pleasure in doing things?  0 - not at all  Feeling down, depressed , or hopeless? 0 - not at all  Patient Health Questionnaire Score: 0     If depressive symptoms identified deferred to follow up visit unless specifically addressed in assessment and plan.    Interpretation of PHQ-9 Total Score   Score Severity   1-4 No Depression   5-9 Mild Depression   10-14 Moderate Depression   15-19 Moderately Severe Depression   20-27 Severe Depression    Screening for Cognitive Impairment  Do you or any of your friends or family members have any concern about your memory? No  Three Minute Recall (Leader, Season, Table) 3/3    David clock face with all 12 numbers and set the hands to show 10 minutes after 11.  Yes    Cognitive concerns identified deferred for follow up unless specifically addressed in assessment and plan.    Fall Risk Assessment  Has the patient had two or more falls in the last year or any fall with injury in the last year?  No    Safety Assessment  Do you always wear your seatbelt?  Yes  Any changes to home needed to function safely? No  Difficulty hearing.  Yes  Patient counseled about all safety risks that were identified.    Functional Assessment ADLs  Are there any barriers preventing you from cooking for yourself or meeting nutritional needs?  No.    Are there any barriers preventing you from driving safely or obtaining transportation?  No.    Are there any barriers preventing you from using a telephone or calling for  help?  No    Are there any barriers preventing you from shopping?  No.    Are there any barriers preventing you from taking care of your own finances?  No    Are there any barriers preventing you from managing your medications?  No    Are there any barriers preventing you from showering, bathing or dressing yourself? No    Are there any barriers preventing you from doing housework or laundry? No  Are there any barriers preventing you from using the toilet?No  Are you currently engaging in any exercise or physical activity?  Yes.      Self-Assessment of Health  What is your perception of your health? Fair    Do you sleep more than six hours a night? Yes    In the past 7 days, how much did pain keep you from doing your normal work? None    Do you spend quality time with family or friends (virtually or in person)? No    Do you usually eat a heart healthy diet that constists of a variety of fruits, vegetables, whole grains and fiber? Yes    Do you eat foods high in fat and/or Fast Food more than three times per week? No    How concerned are you that your medical conditions are not being well managed? Not at all    Are you worried that in the next 2 months, you may not have stable housing that you own, rent, or stay in as part of a household? No      Advance Care Planning  Do you have an Advance Directive, Living Will, Durable Power of , or POLST? Yes  Advance Directive       is on file      Health Maintenance Summary            Overdue - COVID-19 Vaccine (3 - 2024-25 season) Overdue since 9/1/2024 04/16/2021  Imm Admin: PFIZER PURPLE CAP SARS-COV-2 VACCINATION (12+)    03/24/2021  Imm Admin: PFIZER PURPLE CAP SARS-COV-2 VACCINATION (12+)              Overdue - Diabetes: Retinopathy Screening (Yearly) Overdue since 11/6/2024 11/06/2023  POCT Retinal Eye Exam    12/01/2022  RETINAL SCREENING RESULTS    08/22/2022  RETINAL SCREENING RESULTS    08/04/2022  RETINAL SCREENING RESULTS    05/17/2021  REFERRAL  FOR RETINAL SCREENING EXAM    Only the first 5 history entries have been loaded, but more history exists.              Ordered - A1c Screening (Every 6 Months) Ordered on 11/12/2024 05/13/2024  HEMOGLOBIN A1C    09/25/2023  HEMOGLOBIN A1C    03/27/2023  HEMOGLOBIN A1C    01/05/2023  POCT  A1C    09/09/2022  HEMOGLOBIN A1C    Only the first 5 history entries have been loaded, but more history exists.              Postponed - Zoster (Shingles) Vaccines (1 of 2) Postponed until 12/8/2024      No completion history exists for this topic.              Ordered - Fasting Lipid Profile (Yearly) Ordered on 11/12/2024 05/13/2024  Lipid Profile    09/25/2023  Lipid Profile    03/27/2023  Lipid Profile    09/09/2022  Lipid Profile    03/11/2022  Lipid Profile    Only the first 5 history entries have been loaded, but more history exists.              Ordered - Diabetes: Urine Protein Screening (Yearly) Ordered on 11/12/2024 05/13/2024  MICROALBUMIN CREAT RATIO URINE    09/25/2023  MICROALBUMIN CREAT RATIO URINE    09/09/2022  MICROALBUMIN CREAT RATIO URINE    03/11/2022  MICROALBUMIN CREAT RATIO URINE    10/01/2021  MICROALBUMIN CREAT RATIO URINE    Only the first 5 history entries have been loaded, but more history exists.              Ordered - SERUM CREATININE (Yearly) Ordered on 11/12/2024 05/13/2024  Comp Metabolic Panel    09/25/2023  Comp Metabolic Panel    03/27/2023  Comp Metabolic Panel    09/09/2022  Comp Metabolic Panel    03/11/2022  Comp Metabolic Panel    Only the first 5 history entries have been loaded, but more history exists.              Mammogram (Yearly) Next due on 7/2/2025 07/02/2024  MA-SCREENING MAMMO BILAT W/TOMOSYNTHESIS W/CAD    06/06/2023  MA-SCREENING MAMMO BILAT W/TOMOSYNTHESIS W/CAD    01/13/2022  MA-SCREENING MAMMO BILAT W/TOMOSYNTHESIS W/CAD    11/21/2020  MA-SCREENING MAMMO BILAT W/TOMOSYNTHESIS W/CAD    11/15/2019  MA-SCREENING MAMMO BILAT W/TOMOSYNTHESIS W/CAD     Only the first 5 history entries have been loaded, but more history exists.              Diabetes: Monofilament / LE Exam (Yearly) Next due on 7/8/2025 07/08/2024  Diabetic Monofilament Lower Extremity Exam    07/08/2024  [employer]: WORKFLOW - DIABETES - DIABETIC FOOT EXAM PERFORMED    07/06/2023  Diabetic Monofilament Lower Extremity Exam    03/21/2022  Diabetic Monofilament Lower Extremity Exam    03/22/2021  Diabetic Monofilament Lower Extremity Exam    Only the first 5 history entries have been loaded, but more history exists.              Bone Density Scan (Every 2 Years) Next due on 8/17/2025 08/17/2023  DS-BONE DENSITY STUDY (DEXA)    06/29/2020  DS-BONE DENSITY STUDY (DEXA)    05/26/2016  DS-BONE DENSITY STUDY (DEXA)    12/04/2009  DS-BONE DENSITY STUDY (DEXA)              Annual Wellness Visit (Yearly) Next due on 11/12/2025 11/12/2024  Level of Service: [place] ANNUAL WELLNESS VISIT-INCLUDES PPPS SUBSEQUE*    07/06/2023  Level of Service: [place] ANNUAL WELLNESS VISIT-INCLUDES PPPS SUBSEQUE*    10/29/2018  Visit Dx: Encounter for Medicare annual wellness exam    10/29/2018  Level of Service: PB PBEVENTIVE VISIT,EST,65 & OVER              IMM DTaP/Tdap/Td Vaccine (2 - Td or Tdap) Next due on 7/28/2027 07/28/2017  Imm Admin: Tdap Vaccine              Pneumococcal Vaccine: 65+ Years (Series Information) Completed      11/09/2016  Imm Admin: Pneumococcal Conjugate Vaccine (Prevnar/PCV-13)    05/19/2015  Imm Admin: Pneumococcal polysaccharide vaccine (PPSV-23)              Hepatitis C Screening  Tentatively Complete      06/12/2017  Hepatitis C Antibody component of HEP C VIRUS ANTIBODY    02/23/2013  Hepatitis C Antibody component of HEP C VIRUS ANTIBODY              Hepatitis A Vaccine (Hep A) (Series Information) Aged Out      No completion history exists for this topic.              HPV Vaccines (Series Information) Aged Out      No completion history exists for this topic.              Polio  Vaccine (Inactivated Polio) (Series Information) Aged Out      No completion history exists for this topic.              Meningococcal Immunization (Series Information) Aged Out      No completion history exists for this topic.              Discontinued - Cervical Cancer Screening  Discontinued        Frequency changed to Never automatically (Topic No Longer Applies)    09/13/2022  PAP ONLY    09/14/2021  PAP ONLY    01/07/2019  THINPREP PAP WITH HPV    01/07/2019  PATHOLOGY GYN SPECIMEN    Only the first 5 history entries have been loaded, but more history exists.              Discontinued - Colorectal Cancer Screening  Discontinued        Frequency changed to Never automatically (Topic No Longer Applies)    03/04/2015  Colonoscopy (Done)    03/04/2015  REFERRAL TO GI FOR COLONOSCOPY              Discontinued - Hepatitis B Vaccine (Hep B)  Discontinued      No completion history exists for this topic.              Discontinued - Influenza Vaccine  Discontinued      No completion history exists for this topic.                    Patient Care Team:  Temitope Bess D.O. as PCP - General (Internal Medicine)  Temitope Bess D.O. as PCP - [facility] Paneled  Demetris Avery M.D. as Consulting Physician (Cardiovascular Disease (Cardiology))  Pantera Wylie M.D. as Consulting Physician (Gynecologic Oncology)  Nico De Jesus M.D. as Consulting Physician (Ophthalmology)  Sil Singh [facility]         Social History     Tobacco Use    Smoking status: Never    Smokeless tobacco: Never    Tobacco comments:     continued abstinence   Vaping Use    Vaping status: Never Used   Substance Use Topics    Alcohol use: No    Drug use: No     Family History   Problem Relation Age of Onset    Hyperlipidemia Mother     Hypertension Mother     Cancer Mother     No Known Problems Father     Heart Disease Sister     Alcohol/Drug Sister     Hypertension Sister     Heart Attack Sister     Cancer  "Sister 66        ? breast cancer as well    Hypertension Sister     Hyperlipidemia Sister     Cancer Other     Hypertension Other     Diabetes Maternal Grandmother         elderly    Other Sister         suicide     Stroke Neg Hx      She  has a past medical history of Allergic rhinitis, Anesthesia, Atrial fibrillation (HCC), Cancer (HCC) (2014), CATARACT, Cholesterol blood decreased, Cough, Dental disorder, Dermatitis, contact, Dyslipidemia (06/30/2016), Elevated alkaline phosphatase level (09/20/2021), Encounter for general adult medical examination without abnormal findings (12/02/2014), Endometrium cancer (HCC), Hyperlipidemia, Hypertension, Impaired glucose tolerance, Macular degeneration, Obesity, Pneumonia, Prolapsed uterus (08/2014), Pulmonary nodule (11/09/2016), Shortness of breath, Stroke (HCC), TIA (transient ischemic attack), Unspecified hemorrhagic conditions, Vitamin D deficiency, and Wheezing.   Past Surgical History:   Procedure Laterality Date    HYSTERECTOMY ROBOTIC XI  8/28/2014    Performed by Pantera Wylie M.D. at SURGERY [facility] ORS    LYMPH NODE DISSECTION ROBOTIC XI  8/28/2014    Performed by Pantera Wylie M.D. at SURGERY [facility] ORS    CATARACT PHACO WITH IOL  8/19/2010    Performed by DAVE AGUILAR at SURGERY SAME DAY [facility] ORS    CATARACT PHACO WITH IOL  8/5/2010    Performed by DAVE AGUILAR at SURGERY SAME DAY [facility] ORS    HIP ARTHROSCOPY  5/12/08    Performed by ZINA HOLLIS at SURGERY [facility] ORS    ACETABULAR OSTEOTOMY  5/12/08    Performed by ZINA HOLLIS at SURGERY [facility] ORS    ORTHOPEDIC OSTEOTOMY  5/12/08    Performed by ZINA HOLLIS at SURGERY [facility] ORS    NASAL POLYPECTOMY  1994    \"removal of papilloma\"    ABDOMINAL HYSTERECTOMY TOTAL      EYE SURGERY         Exam:   /62 (BP Location: Left arm, Patient Position: Sitting)   Pulse 70   Temp 36.1 °C (97 °F) (Temporal)   Resp 14   Ht 1.6 m (5' 3\")   Wt 78.8 kg (173 lb 11.2 " oz)   SpO2 97%  Body mass index is 30.77 kg/m².    Hearing good.    Dentition good  Alert, oriented in no acute distress.  Eye contact is good, speech goal directed, affect calm         Latest Reference Range & Units 24 08:35   Glycohemoglobin 4.0 - 5.6 % 6.6 (H)   Estim. Avg Glu mg/dL 143     Assessment & Plan  Annual wellness visit  Hyperlipidemia  Type 2 diabetes without complication  Hypertension  History of endometrial cancer  Hypercoagulable state secondary to pAfib  Enlarging lung nodules  Paroxysmal SVT  Lab orders will be provided for her to complete. A request for her eye records from Dr. Jang will be made. She is advised to schedule a CT chest scan for the first week of May 2025 and follow up with Dr. Gibbons of pulmonary in the same month to discuss the results, review cough (ACEI cough?), and discuss enlarging pulmonary nodules.. The order for the CT scan will  on May 17, 2025, so it is recommended to schedule it before this date.    Continue atorvastatin 40 mg daily for dyslipidemia. Continue lisinopril 5 mg daily for hypertension. Follow up with Dr. Wylie of surgical gynecologic oncology as recommended, will be around 2025. She will reach out when she is ready for renewal of freestyle lite test strips.    Continue dabigatran 150 mg twice daily for anticoagulation due to history of atrial fibrillation, tolerating without issue and much more affordable than Eliquis and also creates less bruising on the extremities. Continue metoprolol 50 mg twice daily which has improved rate control and now has less episodes of pSVT/palpitations.          Assessment and Plan. The following treatment and monitoring plan is recommended:      Problem List Items Addressed This Visit       Essential (primary) hypertension    Relevant Medications    atorvastatin (LIPITOR) 40 MG Tab    Other Relevant Orders    Comp Metabolic Panel    CBC WITH DIFFERENTIAL    Hypercoagulable state due to paroxysmal atrial  fibrillation (HCC)    Relevant Medications    atorvastatin (LIPITOR) 40 MG Tab    Hyperlipidemia    Relevant Medications    atorvastatin (LIPITOR) 40 MG Tab    Other Relevant Orders    FREE THYROXINE    TSH    VITAMIN B12    Lipid Profile    Comp Metabolic Panel    CBC WITH DIFFERENTIAL    Lung nodules, enlarging    Type 2 diabetes mellitus without complication, without long-term current use of insulin (HCC)    Relevant Orders    HEMOGLOBIN A1C    MICROALBUMIN CREAT RATIO URINE    Comp Metabolic Panel    CBC WITH DIFFERENTIAL    Vitamin D deficiency    Relevant Orders    VITAMIN D,25 HYDROXY (DEFICIENCY)     Other Visit Diagnoses       Encounter for subsequent annual wellness visit (AWV) in Medicare patient    -  Primary              Services suggested: No services needed at this time  Health Care Screening: Age-appropriate preventive services recommended by USPTF and ACIP covered by Medicare were discussed today. Services ordered if indicated and agreed upon by the patient.  Referrals offered: Community-based lifestyle interventions to reduce health risks and promote self-management and wellness, fall prevention, nutrition, physical activity, tobacco-use cessation, weight loss, and mental health services as per orders if indicated.    Discussion today about general wellness and lifestyle habits:    Prevent falls and reduce trip hazards; Cautioned about securing or removing rugs.  Have a working fire alarm and carbon monoxide detector;   Engage in regular physical activity and social activities     Follow-up: Return in about 6 months (around 5/12/2025).    I spent a total of 35 minutes with record review, exam, communication with the patient, communication with other providers, and documentation of this encounter.    Temitope Bess, DO  Geriatric and Internal Medicine  [facility]

## 2024-11-12 NOTE — LETTER
[facility]  Temitope Bess D.O.  [address]  [place] [address]  Fax: [phone]   Authorization for Release/Disclosure of   Protected Health Information   Name: ALVARO ARTEAGA : 1950 SSN: [id number]   Address: [facility] [place] [address] Phone:    [phone] (home)    I authorize the entity listed below to release/disclose the PHI below to:   [facility]/Temitope Bess D.O. and Temitope Bess D.O.   Provider or Entity Name:  [facility]- Dr. Jang   Address   City, [facility]   Phone:      Fax:     Reason for request: continuity of care   Information to be released:    [  ] LAST COLONOSCOPY,  including any PATH REPORT and follow-up  [  ] LAST FIT/COLOGUARD RESULT [  ] LAST DEXA  [  ] LAST MAMMOGRAM  [  ] LAST PAP  [  ] LAST LABS [  ] RETINA EXAM REPORT  [  ] IMMUNIZATION RECORDS  [ x ] Release all info      [ x ] Check here and initial the line next to each item to release ALL health information INCLUDING  _____ Care and treatment for drug and / or alcohol abuse  _____ HIV testing, infection status, or AIDS  _____ Genetic Testing    DATES OF SERVICE OR TIME PERIOD TO BE DISCLOSED: _________  I understand and acknowledge that:  * This Authorization may be revoked at any time by you in writing, except if your health information has already been used or disclosed.  * Your health information that will be used or disclosed as a result of you signing this authorization could be re-disclosed by the recipient. If this occurs, your re-disclosed health information may no longer be protected by State or Federal laws.  * You may refuse to sign this Authorization. Your refusal will not affect your ability to obtain treatment.  * This Authorization becomes effective upon signing and will  on (date) __________.      If no date is indicated, this Authorization will  one (1) year from the signature date.    Name: Alvaro Arteaga  Signature: Date:    11/12/2024     PLEASE FAX REQUESTED RECORDS BACK TO: [phone]

## 2024-12-09 PROCEDURE — RXMED WILLOW AMBULATORY MEDICATION CHARGE: Performed by: INTERNAL MEDICINE

## 2024-12-10 ENCOUNTER — TELEPHONE (OUTPATIENT)
Dept: RADIOLOGY | Facility: MEDICAL CENTER | Age: 74
End: 2024-12-10
Payer: MEDICARE

## 2024-12-18 PROCEDURE — RXMED WILLOW AMBULATORY MEDICATION CHARGE: Performed by: INTERNAL MEDICINE

## 2024-12-23 ENCOUNTER — PHARMACY VISIT (OUTPATIENT)
Dept: PHARMACY | Facility: MEDICAL CENTER | Age: 74
End: 2024-12-23
Payer: COMMERCIAL

## 2025-01-02 ENCOUNTER — TELEPHONE (OUTPATIENT)
Dept: CARDIOLOGY | Facility: MEDICAL CENTER | Age: 75
End: 2025-01-02
Payer: MEDICARE

## 2025-01-02 NOTE — TELEPHONE ENCOUNTER
Prior Authorization for Dabigatran 150mg caps has been approved for a quantity of 180 , day supply 90    Prior Authorization reference number: (Key: [id number]  PA Case ID #: [id number]    Insurance: [employer] / [employer] D  Effective dates: 01/02/25 - 12/30/25  Copay: $24 delivered for 3 month supply through [facility]     Is patient eligible to fill with [facility]? Yes    Next Steps:  Patient is currently a specialty patient with [facility] and receiving her medications delivered to her home.

## 2025-01-02 NOTE — TELEPHONE ENCOUNTER
Prior Authorization renewal for Dabigatran 150mg caps (Quantity: 180, Days: 90) has been submitted via Cover My Meds: Key (([id number]) - [id number])    Insurance: [employer] / [employer] D    Will follow up in 48-72 business hours.

## 2025-01-13 ENCOUNTER — DOCUMENTATION (OUTPATIENT)
Dept: HEALTH INFORMATION MANAGEMENT | Facility: OTHER | Age: 75
End: 2025-01-13
Payer: MEDICARE

## 2025-01-30 ENCOUNTER — HOSPITAL ENCOUNTER (OUTPATIENT)
Dept: LAB | Facility: MEDICAL CENTER | Age: 75
End: 2025-01-30
Attending: STUDENT IN AN ORGANIZED HEALTH CARE EDUCATION/TRAINING PROGRAM
Payer: MEDICARE

## 2025-01-30 ENCOUNTER — HOSPITAL ENCOUNTER (OUTPATIENT)
Dept: LAB | Facility: MEDICAL CENTER | Age: 75
End: 2025-01-30
Attending: INTERNAL MEDICINE
Payer: MEDICARE

## 2025-01-30 DIAGNOSIS — E55.9 VITAMIN D DEFICIENCY: ICD-10-CM

## 2025-01-30 DIAGNOSIS — I10 ESSENTIAL (PRIMARY) HYPERTENSION: ICD-10-CM

## 2025-01-30 DIAGNOSIS — E78.5 HYPERLIPIDEMIA, UNSPECIFIED HYPERLIPIDEMIA TYPE: ICD-10-CM

## 2025-01-30 DIAGNOSIS — E11.9 TYPE 2 DIABETES MELLITUS WITHOUT COMPLICATION, WITHOUT LONG-TERM CURRENT USE OF INSULIN (HCC): ICD-10-CM

## 2025-01-30 LAB
25(OH)D3 SERPL-MCNC: 45 NG/ML (ref 30–100)
ALBUMIN SERPL BCP-MCNC: 3.9 G/DL (ref 3.2–4.9)
ALBUMIN SERPL BCP-MCNC: 3.9 G/DL (ref 3.2–4.9)
ALBUMIN/GLOB SERPL: 1.5 G/DL
ALBUMIN/GLOB SERPL: 1.6 G/DL
ALP SERPL-CCNC: 89 U/L (ref 30–99)
ALP SERPL-CCNC: 90 U/L (ref 30–99)
ALT SERPL-CCNC: 18 U/L (ref 2–50)
ALT SERPL-CCNC: 18 U/L (ref 2–50)
ANION GAP SERPL CALC-SCNC: 7 MMOL/L (ref 7–16)
ANION GAP SERPL CALC-SCNC: 8 MMOL/L (ref 7–16)
AST SERPL-CCNC: 21 U/L (ref 12–45)
AST SERPL-CCNC: 22 U/L (ref 12–45)
BASOPHILS # BLD AUTO: 1.1 % (ref 0–1.8)
BASOPHILS # BLD AUTO: 1.1 % (ref 0–1.8)
BASOPHILS # BLD: 0.05 K/UL (ref 0–0.12)
BASOPHILS # BLD: 0.05 K/UL (ref 0–0.12)
BILIRUB SERPL-MCNC: 0.7 MG/DL (ref 0.1–1.5)
BILIRUB SERPL-MCNC: 0.7 MG/DL (ref 0.1–1.5)
BUN SERPL-MCNC: 12 MG/DL (ref 8–22)
BUN SERPL-MCNC: 13 MG/DL (ref 8–22)
CALCIUM ALBUM COR SERPL-MCNC: 9.7 MG/DL (ref 8.5–10.5)
CALCIUM ALBUM COR SERPL-MCNC: 9.8 MG/DL (ref 8.5–10.5)
CALCIUM SERPL-MCNC: 9.6 MG/DL (ref 8.5–10.5)
CALCIUM SERPL-MCNC: 9.7 MG/DL (ref 8.5–10.5)
CHLORIDE SERPL-SCNC: 105 MMOL/L (ref 96–112)
CHLORIDE SERPL-SCNC: 105 MMOL/L (ref 96–112)
CHOLEST SERPL-MCNC: 133 MG/DL (ref 100–199)
CO2 SERPL-SCNC: 27 MMOL/L (ref 20–33)
CO2 SERPL-SCNC: 28 MMOL/L (ref 20–33)
CREAT SERPL-MCNC: 0.98 MG/DL (ref 0.5–1.4)
CREAT SERPL-MCNC: 0.99 MG/DL (ref 0.5–1.4)
CREAT UR-MCNC: 92.47 MG/DL
EOSINOPHIL # BLD AUTO: 0.11 K/UL (ref 0–0.51)
EOSINOPHIL # BLD AUTO: 0.12 K/UL (ref 0–0.51)
EOSINOPHIL NFR BLD: 2.4 % (ref 0–6.9)
EOSINOPHIL NFR BLD: 2.6 % (ref 0–6.9)
ERYTHROCYTE [DISTWIDTH] IN BLOOD BY AUTOMATED COUNT: 46.6 FL (ref 35.9–50)
ERYTHROCYTE [DISTWIDTH] IN BLOOD BY AUTOMATED COUNT: 46.9 FL (ref 35.9–50)
EST. AVERAGE GLUCOSE BLD GHB EST-MCNC: 143 MG/DL
GFR SERPLBLD CREATININE-BSD FMLA CKD-EPI: 59 ML/MIN/1.73 M 2
GFR SERPLBLD CREATININE-BSD FMLA CKD-EPI: 60 ML/MIN/1.73 M 2
GLOBULIN SER CALC-MCNC: 2.5 G/DL (ref 1.9–3.5)
GLOBULIN SER CALC-MCNC: 2.6 G/DL (ref 1.9–3.5)
GLUCOSE SERPL-MCNC: 132 MG/DL (ref 65–99)
GLUCOSE SERPL-MCNC: 132 MG/DL (ref 65–99)
HBA1C MFR BLD: 6.6 % (ref 4–5.6)
HCT VFR BLD AUTO: 44.1 % (ref 37–47)
HCT VFR BLD AUTO: 45.1 % (ref 37–47)
HDLC SERPL-MCNC: 62 MG/DL
HGB BLD-MCNC: 14.4 G/DL (ref 12–16)
HGB BLD-MCNC: 14.6 G/DL (ref 12–16)
IMM GRANULOCYTES # BLD AUTO: 0.01 K/UL (ref 0–0.11)
IMM GRANULOCYTES # BLD AUTO: 0.02 K/UL (ref 0–0.11)
IMM GRANULOCYTES NFR BLD AUTO: 0.2 % (ref 0–0.9)
IMM GRANULOCYTES NFR BLD AUTO: 0.4 % (ref 0–0.9)
LDLC SERPL CALC-MCNC: 55 MG/DL
LYMPHOCYTES # BLD AUTO: 0.92 K/UL (ref 1–4.8)
LYMPHOCYTES # BLD AUTO: 0.93 K/UL (ref 1–4.8)
LYMPHOCYTES NFR BLD: 20.1 % (ref 22–41)
LYMPHOCYTES NFR BLD: 20.3 % (ref 22–41)
MCH RBC QN AUTO: 30.5 PG (ref 27–33)
MCH RBC QN AUTO: 30.7 PG (ref 27–33)
MCHC RBC AUTO-ENTMCNC: 32.4 G/DL (ref 32.2–35.5)
MCHC RBC AUTO-ENTMCNC: 32.7 G/DL (ref 32.2–35.5)
MCV RBC AUTO: 94 FL (ref 81.4–97.8)
MCV RBC AUTO: 94.4 FL (ref 81.4–97.8)
MICROALBUMIN UR-MCNC: <1.2 MG/DL
MICROALBUMIN/CREAT UR: NORMAL MG/G (ref 0–30)
MONOCYTES # BLD AUTO: 0.57 K/UL (ref 0–0.85)
MONOCYTES # BLD AUTO: 0.57 K/UL (ref 0–0.85)
MONOCYTES NFR BLD AUTO: 12.4 % (ref 0–13.4)
MONOCYTES NFR BLD AUTO: 12.5 % (ref 0–13.4)
NEUTROPHILS # BLD AUTO: 2.89 K/UL (ref 1.82–7.42)
NEUTROPHILS # BLD AUTO: 2.92 K/UL (ref 1.82–7.42)
NEUTROPHILS NFR BLD: 63.3 % (ref 44–72)
NEUTROPHILS NFR BLD: 63.6 % (ref 44–72)
NRBC # BLD AUTO: 0 K/UL
NRBC # BLD AUTO: 0 K/UL
NRBC BLD-RTO: 0 /100 WBC (ref 0–0.2)
NRBC BLD-RTO: 0 /100 WBC (ref 0–0.2)
PLATELET # BLD AUTO: 262 K/UL (ref 164–446)
PLATELET # BLD AUTO: 266 K/UL (ref 164–446)
PMV BLD AUTO: 10 FL (ref 9–12.9)
PMV BLD AUTO: 10.1 FL (ref 9–12.9)
POTASSIUM SERPL-SCNC: 5.4 MMOL/L (ref 3.6–5.5)
POTASSIUM SERPL-SCNC: 5.5 MMOL/L (ref 3.6–5.5)
PROT SERPL-MCNC: 6.4 G/DL (ref 6–8.2)
PROT SERPL-MCNC: 6.5 G/DL (ref 6–8.2)
RBC # BLD AUTO: 4.69 M/UL (ref 4.2–5.4)
RBC # BLD AUTO: 4.78 M/UL (ref 4.2–5.4)
SODIUM SERPL-SCNC: 140 MMOL/L (ref 135–145)
SODIUM SERPL-SCNC: 140 MMOL/L (ref 135–145)
T4 FREE SERPL-MCNC: 1.16 NG/DL (ref 0.93–1.7)
TRIGL SERPL-MCNC: 80 MG/DL (ref 0–149)
TSH SERPL-ACNC: 1.58 UIU/ML (ref 0.35–5.5)
VIT B12 SERPL-MCNC: 684 PG/ML (ref 211–911)
WBC # BLD AUTO: 4.6 K/UL (ref 4.8–10.8)
WBC # BLD AUTO: 4.6 K/UL (ref 4.8–10.8)

## 2025-01-30 PROCEDURE — 82043 UR ALBUMIN QUANTITATIVE: CPT

## 2025-01-30 PROCEDURE — 84439 ASSAY OF FREE THYROXINE: CPT

## 2025-01-30 PROCEDURE — 80053 COMPREHEN METABOLIC PANEL: CPT

## 2025-01-30 PROCEDURE — 84443 ASSAY THYROID STIM HORMONE: CPT

## 2025-01-30 PROCEDURE — 36415 COLL VENOUS BLD VENIPUNCTURE: CPT

## 2025-01-30 PROCEDURE — 85025 COMPLETE CBC W/AUTO DIFF WBC: CPT

## 2025-01-30 PROCEDURE — 82570 ASSAY OF URINE CREATININE: CPT

## 2025-01-30 PROCEDURE — 85025 COMPLETE CBC W/AUTO DIFF WBC: CPT | Mod: 91

## 2025-01-30 PROCEDURE — 80061 LIPID PANEL: CPT

## 2025-01-30 PROCEDURE — 80053 COMPREHEN METABOLIC PANEL: CPT | Mod: 91

## 2025-01-30 PROCEDURE — 82607 VITAMIN B-12: CPT

## 2025-01-30 PROCEDURE — 83036 HEMOGLOBIN GLYCOSYLATED A1C: CPT

## 2025-01-30 PROCEDURE — 82306 VITAMIN D 25 HYDROXY: CPT

## 2025-02-18 ENCOUNTER — HOSPITAL ENCOUNTER (OUTPATIENT)
Dept: RADIOLOGY | Facility: MEDICAL CENTER | Age: 75
End: 2025-02-18
Attending: INTERNAL MEDICINE
Payer: MEDICARE

## 2025-02-18 DIAGNOSIS — D3A.090 BENIGN CARCINOID TUMOR OF LUNG: ICD-10-CM

## 2025-02-18 PROCEDURE — 71250 CT THORAX DX C-: CPT

## 2025-02-20 ENCOUNTER — OFFICE VISIT (OUTPATIENT)
Dept: CARDIOLOGY | Facility: MEDICAL CENTER | Age: 75
End: 2025-02-20
Attending: INTERNAL MEDICINE
Payer: MEDICARE

## 2025-02-20 VITALS
SYSTOLIC BLOOD PRESSURE: 106 MMHG | HEART RATE: 56 BPM | OXYGEN SATURATION: 97 % | WEIGHT: 176 LBS | DIASTOLIC BLOOD PRESSURE: 64 MMHG | HEIGHT: 63 IN | RESPIRATION RATE: 17 BRPM | BODY MASS INDEX: 31.18 KG/M2

## 2025-02-20 DIAGNOSIS — Z79.01 CHRONIC ANTICOAGULATION: ICD-10-CM

## 2025-02-20 DIAGNOSIS — D68.69 HYPERCOAGULABLE STATE DUE TO PAROXYSMAL ATRIAL FIBRILLATION (HCC): ICD-10-CM

## 2025-02-20 DIAGNOSIS — I48.0 HYPERCOAGULABLE STATE DUE TO PAROXYSMAL ATRIAL FIBRILLATION (HCC): ICD-10-CM

## 2025-02-20 DIAGNOSIS — I48.0 PAROXYSMAL ATRIAL FIBRILLATION (HCC): ICD-10-CM

## 2025-02-20 DIAGNOSIS — I10 ESSENTIAL (PRIMARY) HYPERTENSION: ICD-10-CM

## 2025-02-20 DIAGNOSIS — E78.00 PURE HYPERCHOLESTEROLEMIA: ICD-10-CM

## 2025-02-20 PROCEDURE — 99214 OFFICE O/P EST MOD 30 MIN: CPT | Performed by: INTERNAL MEDICINE

## 2025-02-20 PROCEDURE — 3078F DIAST BP <80 MM HG: CPT | Performed by: INTERNAL MEDICINE

## 2025-02-20 PROCEDURE — 99213 OFFICE O/P EST LOW 20 MIN: CPT | Performed by: INTERNAL MEDICINE

## 2025-02-20 PROCEDURE — 3074F SYST BP LT 130 MM HG: CPT | Performed by: INTERNAL MEDICINE

## 2025-02-20 ASSESSMENT — ENCOUNTER SYMPTOMS
VOMITING: 0
DIZZINESS: 0
WEAKNESS: 0
CARDIOVASCULAR NEGATIVE: 1
NAUSEA: 0
FOCAL WEAKNESS: 0
PALPITATIONS: 0
CONSTITUTIONAL NEGATIVE: 1
NEUROLOGICAL NEGATIVE: 1
COUGH: 1
MYALGIAS: 0
EYES NEGATIVE: 1
CHILLS: 0
SHORTNESS OF BREATH: 0
MUSCULOSKELETAL NEGATIVE: 1
DEPRESSION: 0
GASTROINTESTINAL NEGATIVE: 1
CLAUDICATION: 0
DOUBLE VISION: 0
FEVER: 0
NERVOUS/ANXIOUS: 0
ABDOMINAL PAIN: 0
HEADACHES: 0
PSYCHIATRIC NEGATIVE: 1
WEIGHT LOSS: 0
BRUISES/BLEEDS EASILY: 0
BLURRED VISION: 0

## 2025-02-20 ASSESSMENT — FIBROSIS 4 INDEX: FIB4 SCORE: 1.414213562373095049

## 2025-02-20 NOTE — PROGRESS NOTES
"Chief Complaint   Patient presents with    Follow-Up     FU DX:Paroxysmal atrial fibrillation (HCC)       Subjective     Cristela Alan is a 74 y.o. female who presents today for follow up of hypertension and hyperlipidemia, atrial tachycardia, atrial fibrillation on chronic anticoagulation therapy, prior transient ischemic attack.    Since the patient's last visit on 08/23/24, she has been doing well clinically from cardiac standpoint. She denies fatigue, chest pain, shortness of breath, palpitations, lower extremity edema, dizziness or syncope. She is active gardening and yard work. She is under significant stress currently with her car and house issues.     Past Medical History:   Diagnosis Date    Allergic rhinitis     Anesthesia     hypotension    Atrial fibrillation (HCC)     Cancer (HCC) 2014    endometrial    CATARACT     bilateral lens implants    Cholesterol blood decreased     Cough     Dental disorder     dentures    Dermatitis, contact     Dyslipidemia 06/30/2016    Elevated alkaline phosphatase level 09/20/2021      Ref. Range 3/18/2021 08:22 9/3/2021 08:23  Alkaline Phosphatase Latest Ref Range: 30 - 99 U/L 85 104 (H)   New finding of mildly elevated alkaline phosphatase.  She has history of endometrial cancer.  She has osteopenia with elevated FRAX.  No known primary liver disease.    Encounter for general adult medical examination without abnormal findings 12/02/2014    [facility] for GI No flu shots- egg allergies  Declines all vaccines- had reaction to tdap    Endometrium cancer (HCC)     Hyperlipidemia     Hypertension     Impaired glucose tolerance     Macular degeneration     Obesity     Pneumonia     had PNU 35 years ago    Prolapsed uterus 08/2014    current issue    Pulmonary nodule 11/09/2016    No history of tobacco use CT 9/16, 12/16- no change-6/17- ? New nodule>>PET CT-10/17 neg     Shortness of breath     Stroke (HCC)     \"mini strokes\" TIA, no residual    TIA (transient ischemic attack)  " "   Unspecified hemorrhagic conditions     NOSE BLEEDS/on Plavix    Vitamin D deficiency     Wheezing      Past Surgical History:   Procedure Laterality Date    HYSTERECTOMY ROBOTIC XI  8/28/2014    Performed by Pantera Wylie M.D. at SURGERY [facility] ORS    LYMPH NODE DISSECTION ROBOTIC XI 8/28/2014    Performed by Pantera Wylie M.D. at SURGERY [facility] ORS    CATARACT PHACO WITH IOL  8/19/2010    Performed by DAVE AGUILAR at SURGERY SAME DAY [facility] ORS    CATARACT PHACO WITH IOL  8/5/2010    Performed by DAVE AGUILAR at SURGERY SAME DAY [facility] ORS    HIP ARTHROSCOPY  5/12/08    Performed by ZINA HOLLIS at SURGERY [facility] ORS    ACETABULAR OSTEOTOMY  5/12/08    Performed by ZINA HOLLIS at SURGERY [facility] ORS    ORTHOPEDIC OSTEOTOMY  5/12/08    Performed by ZINA HOLLIS at SURGERY [facility] ORS    NASAL POLYPECTOMY  1994    \"removal of papilloma\"    ABDOMINAL HYSTERECTOMY TOTAL      EYE SURGERY       Family History   Problem Relation Age of Onset    Hyperlipidemia Mother     Hypertension Mother     Cancer Mother     No Known Problems Father     Heart Disease Sister     Alcohol/Drug Sister     Hypertension Sister     Heart Attack Sister     Cancer Sister 66        ? breast cancer as well    Hypertension Sister     Hyperlipidemia Sister     Cancer Other     Hypertension Other     Diabetes Maternal Grandmother         elderly    Other Sister         suicide     Stroke Neg Hx      Social History     Socioeconomic History    Marital status:      Spouse name: Not on file    Number of children: Not on file    Years of education: Not on file    Highest education level: Not on file   Occupational History    Not on file   Tobacco Use    Smoking status: Never    Smokeless tobacco: Never    Tobacco comments:     continued abstinence   Vaping Use    Vaping status: Never Used   Substance and Sexual Activity    Alcohol use: No    Drug use: No    Sexual activity: Not Currently   Other " "Topics Concern    Not on file   Social History Narrative    She is  to Art for 49 years, they met at a [employer] [place]. She has a daughter Nikki (43, [place]) and she has 2 kids. She retired in her late 60's. 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     Social Drivers of Health     Financial Resource Strain: Not on file   Food Insecurity: Not on file   Transportation Needs: Not on file   Physical Activity: Not on file   Stress: Not on file   Social Connections: Not on file   Intimate Partner Violence: Not on file   Housing Stability: Not on file     Allergies   Allergen Reactions    Fentanyl Anaphylaxis    Flu Virus Vaccine     Versed Shortness of Breath and Vomiting    Bee      And  spider--- tongue swelling    Codeine      Increased heart rate    Eggs Vomiting    Lidocaine Hcl      \"severe drop in BP\"    Other Drug      Novocaine hypotension ALL \"MELISA\"    Penicillins      Increased heart rate    Soap Rash     Laundry Tide soap, Dove and Ivory soap    Tape Rash     Paper tape OK    Xylocaine [Lidocaine Hcl, Local Anesth.]      All \"EMLISA\" hypotension     (Medications reviewed.)  Outpatient Encounter Medications as of 2/20/2025   Medication Sig Dispense Refill    atorvastatin (LIPITOR) 40 MG Tab TAKE 1 TABLET BY MOUTH EVERY DAY IN THE EVENING 100 Tablet 3    dabigatran (PRADAXA) 150 MG Cap capsule Take 1 Capsule by mouth 2 times a day. 200 Capsule 3    FreeStyle Lancets Misc USE ONCE DAILY AND AS NEEDED FOR SYMTOMS OF HIGH OR LOW SUGAR 100 Each 3    glucose blood (FREESTYLE LITE) strip USE 1 STRIP TO TEST BLOOD SUGARS EVERY  Strip 3    Lancets Use once daily as needed for symptoms of high or low sugar 100 Each 3    metoprolol tartrate (LOPRESSOR) 50 MG Tab Take 1 Tablet by mouth 2 times a day. 200 Tablet 2    lisinopril (PRINIVIL) 5 MG Tab TAKE 1 TABLET BY MOUTH EVERY  Tablet 3    Multiple Vitamins-Minerals (PRESERVISION AREDS PO) Take  by mouth every day.      Cholecalciferol " "(VITAMIN D HIGH POTENCY PO) Take 2,000 mg by mouth every day.      Polyethylene Glycol 400 (BLINK TEARS OP) by Ophthalmic route every day.      CALCIUM PO Take 1 Tablet by mouth every morning. Taking 2000mg  Indications: per pt is taking 500 mg      Polyethyl Glycol-Propyl Glycol (SYSTANE OP) Place 1 Drop in both eyes every bedtime. Indications: **OTC**       No facility-administered encounter medications on file as of 2/20/2025.     Review of Systems   Constitutional: Negative.  Negative for chills, fever, malaise/fatigue and weight loss.   HENT: Negative.  Negative for hearing loss.    Eyes: Negative.  Negative for blurred vision and double vision.   Respiratory:  Positive for cough. Negative for shortness of breath.    Cardiovascular: Negative.  Negative for chest pain, palpitations, claudication and leg swelling.   Gastrointestinal: Negative.  Negative for abdominal pain, nausea and vomiting.   Genitourinary: Negative.  Negative for dysuria and urgency.   Musculoskeletal: Negative.  Negative for joint pain and myalgias.   Skin: Negative.  Negative for itching and rash.   Neurological: Negative.  Negative for dizziness, focal weakness, weakness and headaches.   Endo/Heme/Allergies: Negative.  Does not bruise/bleed easily.   Psychiatric/Behavioral: Negative.  Negative for depression. The patient is not nervous/anxious.               Objective     /64 (BP Location: Left arm, Patient Position: Sitting, BP Cuff Size: Adult)   Pulse (!) 56   Resp 17   Ht 1.6 m (5' 3\")   Wt 79.8 kg (176 lb)   SpO2 97%   BMI 31.18 kg/m²     Physical Exam  Constitutional:       Appearance: Normal appearance. She is well-developed and normal weight.   HENT:      Head: Normocephalic and atraumatic.   Neck:      Vascular: No JVD.   Cardiovascular:      Rate and Rhythm: Normal rate and regular rhythm.      Heart sounds: Normal heart sounds.   Pulmonary:      Effort: Pulmonary effort is normal.      Breath sounds: Normal breath " sounds.   Abdominal:      General: Bowel sounds are normal.      Palpations: Abdomen is soft.      Comments: No hepatosplenomegaly.   Musculoskeletal:         General: Normal range of motion.   Lymphadenopathy:      Cervical: No cervical adenopathy.   Skin:     General: Skin is warm and dry.   Neurological:      Mental Status: She is alert and oriented to person, place, and time.            CARDIAC STUDIES/PROCEDURES:     CAROTID ULTRASOUND (06/18/18)  Mild bilateral internal carotid artery stenosis (<50%).     ECHOCARDIOGRAM CONCLUSIONS (07/10/24)  Normal transthoracic echocardiogram     ECHOCARDIOGRAM CONCLUSIONS (06/18/18)  Compared to the images of the prior study.   Normal left ventricular systolic function.  Left ventricular ejection fraction is visually estimated to be 75%.  Grade II diastolic dysfunction.  No significant valve abnormalities.      ECHOCARDIOGRAM CONCLUSIONS (12/05/12)  Technically difficult study  Normal left ventricular size, thickness, systolic function, and diastolic function.  Left ventricular ejection fraction is 55% to 60%.  Structurally normal mitral valve.  No stenosis or regurgitation seen.  Structurally normal aortic valve.  No stenosis or regurgitation seen.  Structurally normal tricuspid valve.  Trace tricuspid regurgitation.      EKG performed on (08/24/14) EKG shows normal sinus rhythm.  EKG performed on (07/30/14) EKG shows normal sinus rhythm.  EKG performed on (12/06/12) EKG shows normal sinus rhythm.  EKG performed on (12/05/12) EKG shows atrial fibrillation.     Laboratory results of (05/13/24) Cholesterol profile of 141/66/66/62 mg/dL noted.  Laboratory results of (09/25/23) Cholesterol profile of 146/87/61/68 mg/dL noted.   Laboratory results of (03/27/23) Cholesterol profile of 144/90/63/63 mg/dL noted.  Laboratory results of (09/14/20) Cholesterol profile of 183/136/60/96 mg/dl noted.  Laboratory results of (05/30/19) Cholesterol profile of 179/132/56/97  noted.  Laboratory results of (06/12/17) Cholesterol profile of 169/137/54/88 noted.  Laboratory results of (09/14/16) Cholesterol profile of 151/103/55/75 noted.  Laboratory results of (06/01/15) Cholesterol profile of 190/150/54/106 noted.     MYOCARDIAL PERFUSION STUDY CONCLUSIONS (05/13/24)  NUCLEAR IMAGING INTERPRETATION  Normal Lexiscan myocardial perfusion study.  No evidence of ischemia or infarct.  SDS 0.  LVEF 81%.  No ischemic changes with Regadenoson.  No arrhythmias.  No chest pain.  ECG INTERPRETATION  Negative stress ECG for ischemia.    MYOCARDIAL PERFUSION STUDY CONCLUSIONS (06/18/18)  No evidence of significant jeopardized viable myocardium or prior myocardial infarction.  Normal left ventricular size, ejection fraction, and wall motion.  ECG INTERPRETATION  Negative stress ECG for ischemia.     MPI CONCLUSIONS (06/07/06)  NORMAL MYOCARDIAL PERFUSION IMAGES WITH NO EVIDENCE OF SIGNIFICANT   ISCHEMIA OR INFARCTION.     ZIO REPORT (05/14/24-05/28/24)   Zio study showing predominately sinus rhythm with maximum rate of 179 bpm, minimum rate of 47 bpm, average of 68 bpm.   Atrial fibrillation: None.   Supraventricular tachycardia: 53 episodes of supraventricular tachycardia/atrial tachycardia with fastest interval lasting 7 beats with a max rate of 179 bpm with longest lasting 18 beats with average rate of 113 bpm noted.   Pauses: None.   Heart block: None.   Ventricular tachycardia: None.   Rare <1% burden of premature atrial contractions, rare couplets and triplets.   Rare <1% burden of premature ventricular contractions, rare couplets.    6 Patient events correlated with  premature atrial contractions and atrial tachycardia.     Assessment & Plan     1. Essential (primary) hypertension        2. Pure hypercholesterolemia        3. Paroxysmal atrial fibrillation (HCC)        4. Hypercoagulable state due to paroxysmal atrial fibrillation (HCC)        5. Chronic anticoagulation            Medical  Decision Making: Today's Assessment/Status/Plan:        Hypertension: She is a 73 year old female with hypertension and hyperlipidemia, atrial tachycardia, atrial fibrillation on chronic anticoagulation therapy, prior transient ischemic attack. Currently, the average blood pressure is well controlled. We will continue with metoprolol, lisinopril.  Hyperlipidemia: She is doing well on statin therapy without myalgia symptoms. (Managed by primary care physician)  Atrial fibrillation on anticoagulation therapy (Pradaxa): She is doing well on anticoagulation without palpitations.    History of trans-ischemic attack (2000): She remains clinically stable without any new neurological symptoms.  Health maintenance: She underwent surgery for uterine cancer (08/27/14). She was diagnosed with neuroendocrine tumor.     We will follow up the patient in 1 year.    CC Temitope Mccullough

## 2025-02-23 ENCOUNTER — PATIENT MESSAGE (OUTPATIENT)
Dept: SLEEP MEDICINE | Facility: MEDICAL CENTER | Age: 75
End: 2025-02-23
Payer: MEDICARE

## 2025-03-10 ENCOUNTER — TELEPHONE (OUTPATIENT)
Dept: SLEEP MEDICINE | Facility: MEDICAL CENTER | Age: 75
End: 2025-03-10
Payer: MEDICARE

## 2025-03-10 ENCOUNTER — RESULTS FOLLOW-UP (OUTPATIENT)
Dept: SLEEP MEDICINE | Facility: MEDICAL CENTER | Age: 75
End: 2025-03-10
Payer: MEDICARE

## 2025-03-10 DIAGNOSIS — D3A.090 BENIGN CARCINOID TUMOR OF LUNG: ICD-10-CM

## 2025-03-11 ENCOUNTER — TELEPHONE (OUTPATIENT)
Dept: MEDICAL GROUP | Facility: PHYSICIAN GROUP | Age: 75
End: 2025-03-11
Payer: MEDICARE

## 2025-03-11 DIAGNOSIS — E11.9 TYPE 2 DIABETES MELLITUS WITHOUT COMPLICATION, WITHOUT LONG-TERM CURRENT USE OF INSULIN (HCC): ICD-10-CM

## 2025-03-11 NOTE — TELEPHONE ENCOUNTER
Updated CT chest was reviewed/compared to prior imaging (including dotatate PET in 3/2024) at tumor board conference on 3/6/2025.     Clinical impression is carcinoid  Updated CT shows continued minimal/no growth, which behavior consistent with carcinoid    Vertebral body uptake was felt to represent chronic inflammation    Collective recommendation of the board was continued radiographic surveillance annually.    Pt called and informed of this recommendation. All questions answered. Repeat CT ordered for 1 year. Plans to keep her f/u appt in 4/2025 to discuss cough.    1. Benign carcinoid tumor of lung  CT-CHEST (THORAX) W/O

## 2025-03-12 NOTE — TELEPHONE ENCOUNTER
1. Caller Name: >name                          Call Back Number: [phone] (home)         How would the patient prefer to be contacted with a response: Phone call OK to leave a detailed message    Referral to podiatry  Toe nail 2nd from big toe on right foot   Needs cutting and she can't do it

## 2025-03-24 ENCOUNTER — PATIENT MESSAGE (OUTPATIENT)
Dept: HEALTH INFORMATION MANAGEMENT | Facility: OTHER | Age: 75
End: 2025-03-24

## 2025-03-24 DIAGNOSIS — R00.2 PALPITATIONS: ICD-10-CM

## 2025-03-24 DIAGNOSIS — I10 ESSENTIAL (PRIMARY) HYPERTENSION: ICD-10-CM

## 2025-03-24 PROCEDURE — RXMED WILLOW AMBULATORY MEDICATION CHARGE: Performed by: INTERNAL MEDICINE

## 2025-03-24 RX ORDER — LISINOPRIL 5 MG/1
5 TABLET ORAL DAILY
Qty: 100 TABLET | Refills: 3 | Status: SHIPPED | OUTPATIENT
Start: 2025-03-24

## 2025-03-24 RX ORDER — METOPROLOL TARTRATE 50 MG
50 TABLET ORAL 2 TIMES DAILY
Qty: 200 TABLET | Refills: 3 | Status: SHIPPED | OUTPATIENT
Start: 2025-03-24

## 2025-03-24 NOTE — TELEPHONE ENCOUNTER
Is the patient due for a refill? Yes    Was the patient seen the last 15 months? Yes    Date of last office visit: 02.20.2025    Does the patient have an upcoming appointment?  No       Provider to refill: LINDSEY    Does the patient have [facility] and need 100-day supply? (This applies to ALL medications) Yes, quantity updated to 100 days

## 2025-03-26 PROCEDURE — RXMED WILLOW AMBULATORY MEDICATION CHARGE: Performed by: INTERNAL MEDICINE

## 2025-03-28 ENCOUNTER — PHARMACY VISIT (OUTPATIENT)
Dept: PHARMACY | Facility: MEDICAL CENTER | Age: 75
End: 2025-03-28
Payer: COMMERCIAL

## 2025-04-14 ENCOUNTER — TELEPHONE (OUTPATIENT)
Dept: MEDICAL GROUP | Facility: PHYSICIAN GROUP | Age: 75
End: 2025-04-14

## 2025-04-14 ENCOUNTER — OFFICE VISIT (OUTPATIENT)
Dept: SLEEP MEDICINE | Facility: MEDICAL CENTER | Age: 75
End: 2025-04-14
Attending: NURSE PRACTITIONER
Payer: MEDICARE

## 2025-04-14 VITALS
OXYGEN SATURATION: 97 % | WEIGHT: 172.4 LBS | HEIGHT: 63 IN | HEART RATE: 82 BPM | DIASTOLIC BLOOD PRESSURE: 62 MMHG | BODY MASS INDEX: 30.55 KG/M2 | SYSTOLIC BLOOD PRESSURE: 100 MMHG

## 2025-04-14 DIAGNOSIS — D3A.090 BENIGN CARCINOID TUMOR OF LUNG: ICD-10-CM

## 2025-04-14 DIAGNOSIS — R91.8 MULTIPLE PULMONARY NODULES: ICD-10-CM

## 2025-04-14 DIAGNOSIS — Z78.9 NONSMOKER: ICD-10-CM

## 2025-04-14 DIAGNOSIS — D3A.090 CARCINOID TUMOR OF LUNG, UNSPECIFIED WHETHER MALIGNANT: ICD-10-CM

## 2025-04-14 DIAGNOSIS — R91.8 LUNG MASS: ICD-10-CM

## 2025-04-14 PROCEDURE — 3074F SYST BP LT 130 MM HG: CPT | Performed by: NURSE PRACTITIONER

## 2025-04-14 PROCEDURE — 3078F DIAST BP <80 MM HG: CPT | Performed by: NURSE PRACTITIONER

## 2025-04-14 PROCEDURE — 99214 OFFICE O/P EST MOD 30 MIN: CPT | Performed by: NURSE PRACTITIONER

## 2025-04-14 PROCEDURE — 99211 OFF/OP EST MAY X REQ PHY/QHP: CPT | Performed by: NURSE PRACTITIONER

## 2025-04-14 ASSESSMENT — FIBROSIS 4 INDEX: FIB4 SCORE: 1.414213562373095049

## 2025-04-14 NOTE — PATIENT INSTRUCTIONS
Complete CT scan in 2/2026    Start flonase 1 squirt twice daily for 7 days to see if cough resolves or improves

## 2025-04-14 NOTE — PROGRESS NOTES
Chief Complaint   Patient presents with    Follow-Up     Dx/Last seen:  Benign carcinoid tumor of lung 5/17/25 Ashish Gibbons     Tests completed: CT-Chest 2/19/25, OPO 5/17/24    Cough     Evening and morning started having a cough but not sure if its due to allergies or what is going on       HPI:  Cristela Alan is a 74 y.o. year old female here today for follow-up on multiple lung nodules.  PMH: paroxysmal atrial fibrillation on Pradaxa, osteopenia, type 2 diabetes, carotid artery stenosis, history of TIA in 2000, history of endometrial cancer. She has a very slowly growing nodule in the right upper lobe, with interval growth since 2017 from 9 to 13 mm.   Last OV 5/17/24 with Dr. Gibbons     [facility] stGstrstastdstest:st st1st Interval Events:  4/14/25: Recommendation for recent CT results called to patient by Dr. Gibbons 3/10/25 to repeat imaging 2/2026 due to behavior of carcinoid tumor with very slow growth. Vertebral body update was felt to be chronic inflammation and note metastasis.   She presents today with recently new cough that is generally dry and can be raspy.  She tends to notice in the morning in the evenings.  It can be sporadic.  She does not associate with meal times or GERD symptoms.  She is taking supplemental calcium twice daily with Tums.  She believes she may be having some environmental allergies and cough drops help relieve it.  No chest pain, chest tightness or wheezing.  She does have runny nose and postnasal drip.  She has used Flonase in the past.  She has been on lisinopril since age 48 with no prior cough complaint.    PULM HX:  Never smoker  Multi ox normal on RA rest/exertion  Active cleaning house/yard work.  has passed.    PET CT 3/28/24:  1.  Focal increased activity corresponds to RIGHT upper lobe lung nodule consistent with neuroendocrine tumor.  2.  Focal uptake in the T11 vertebral body concerning for metastasis.  3.  Mild increased activity corresponds to RIGHT flank subcutaneous  "nodule, chronic inflammation favored over metastasis.  4.  No other evidence for metastatic disease.    CT chest 2/18/25:  1. Increase in size of 3 small lung nodules compared with 10/19/2023 CT chest. Difficult to compare to 3/28/2024 PET/CT. 2 lesions are stable including the largest 13 mm right upper lobe nodule.  2. No adenopathy or effusion.  3. 9 mm T11 lucent focus inferiorly is new since 10/19/2023 CT chest. This might be degenerative although the patient did have hypermetabolic activity in this region on 3/20/2024 PET/CT and a growing metastasis is possible.  4. Portions of a large ventral hernia in the upper abdomen containing bowel visible.      ROS: As per HPI and otherwise negative if not stated.    Past Medical History:   Diagnosis Date    Allergic rhinitis     Anesthesia     hypotension    Atrial fibrillation (HCC)     Cancer (HCC) 2014    endometrial    CATARACT     bilateral lens implants    Cholesterol blood decreased     Cough     Dental disorder     dentures    Dermatitis, contact     Dyslipidemia 06/30/2016    Elevated alkaline phosphatase level 09/20/2021      Ref. Range 3/18/2021 08:22 9/3/2021 08:23  Alkaline Phosphatase Latest Ref Range: 30 - 99 U/L 85 104 (H)   New finding of mildly elevated alkaline phosphatase.  She has history of endometrial cancer.  She has osteopenia with elevated FRAX.  No known primary liver disease.    Encounter for general adult medical examination without abnormal findings 12/02/2014    [place] for GI No flu shots- egg allergies  Declines all vaccines- had reaction to tdap    Endometrium cancer (HCC)     Hyperlipidemia     Hypertension     Impaired glucose tolerance     Macular degeneration     Obesity     Pneumonia     had PNU 35 years ago    Prolapsed uterus 08/2014    current issue    Pulmonary nodule 11/09/2016    No history of tobacco use CT 9/16, 12/16- no change-6/17- ? New nodule>>PET CT-10/17 neg     Shortness of breath     Stroke (HCC)     \"mini strokes\" " "TIA, no residual    TIA (transient ischemic attack)     Unspecified hemorrhagic conditions     NOSE BLEEDS/on Plavix    Vitamin D deficiency     Wheezing        Past Surgical History:   Procedure Laterality Date    HYSTERECTOMY ROBOTIC XI  8/28/2014    Performed by Pantera Wylie M.D. at SURGERY [facility] ORS    LYMPH NODE DISSECTION ROBOTIC XI  8/28/2014    Performed by Pantera Wylie M.D. at SURGERY [facility] ORS    CATARACT PHACO WITH IOL  8/19/2010    Performed by DAVE AGUILAR at SURGERY SAME DAY [facility] ORS    CATARACT PHACO WITH IOL  8/5/2010    Performed by DAVE AGUILAR at SURGERY SAME DAY [facility] ORS    HIP ARTHROSCOPY  5/12/08    Performed by ZINA HOLLIS at SURGERY [facility] ORS    ACETABULAR OSTEOTOMY  5/12/08    Performed by ZINA HOLLIS at SURGERY [facility] ORS    ORTHOPEDIC OSTEOTOMY  5/12/08    Performed by ZINA HOLLIS at SURGERY [facility] ORS    NASAL POLYPECTOMY  1994    \"removal of papilloma\"    ABDOMINAL HYSTERECTOMY TOTAL      EYE SURGERY         Family History   Problem Relation Age of Onset    Hyperlipidemia Mother     Hypertension Mother     Cancer Mother     No Known Problems Father     Heart Disease Sister     Alcohol/Drug Sister     Hypertension Sister     Heart Attack Sister     Cancer Sister 66        ? breast cancer as well    Hypertension Sister     Hyperlipidemia Sister     Cancer Other     Hypertension Other     Diabetes Maternal Grandmother         elderly    Other Sister         suicide     Stroke Neg Hx        Social History     Socioeconomic History    Marital status:      Spouse name: Not on file    Number of children: Not on file    Years of education: Not on file    Highest education level: Not on file   Occupational History    Not on file   Tobacco Use    Smoking status: Never    Smokeless tobacco: Never    Tobacco comments:     continued abstinence   Vaping Use    Vaping status: Never Used   Substance and Sexual Activity    Alcohol use: " "No    Drug use: No    Sexual activity: Not Currently   Other Topics Concern    Not on file   Social History Narrative    She is  to Art for 49 years, they met at [place] [employer] [place]. She has a daughter Nikki (43, [place]) and she has 2 kids. She retired in her late 60's. 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     Social Drivers of Health     Financial Resource Strain: Not on file   Food Insecurity: Not on file   Transportation Needs: Not on file   Physical Activity: Not on file   Stress: Not on file   Social Connections: Not on file   Intimate Partner Violence: Not on file   Housing Stability: Not on file       Allergies as of 04/14/2025 - Reviewed 04/14/2025   Allergen Reaction Noted    Fentanyl Anaphylaxis 12/06/2012    Flu virus vaccine  12/02/2014    Versed Shortness of Breath and Vomiting 12/06/2012    Bee  12/07/2012    Codeine  05/05/2008    Eggs Vomiting 12/05/2012    Lidocaine hcl  05/05/2008    Other drug  08/25/2014    Penicillins  05/05/2008    Soap Rash 05/05/2008    Tape Rash 05/05/2008    Xylocaine [lidocaine hcl, local anesth.]  08/05/2010        Vitals:  /62   Pulse 82   Ht 1.6 m (5' 3\")   Wt 78.2 kg (172 lb 6.4 oz)   SpO2 97%     Current medications as of today   Current Outpatient Medications   Medication Sig Dispense Refill    metoprolol tartrate (LOPRESSOR) 50 MG Tab Take 1 Tablet by mouth 2 times a day. 200 Tablet 3    lisinopril (PRINIVIL) 5 MG Tab TAKE 1 TABLET BY MOUTH EVERY  Tablet 3    atorvastatin (LIPITOR) 40 MG Tab TAKE 1 TABLET BY MOUTH EVERY DAY IN THE EVENING 100 Tablet 3    dabigatran (PRADAXA) 150 MG Cap capsule Take 1 Capsule by mouth 2 times a day. 200 Capsule 3    FreeStyle Lancets Misc USE ONCE DAILY AND AS NEEDED FOR SYMTOMS OF HIGH OR LOW SUGAR 100 Each 3    glucose blood (FREESTYLE LITE) strip USE 1 STRIP TO TEST BLOOD SUGARS EVERY  Strip 3    Lancets Use once daily as needed for symptoms of high or low sugar 100 Each 3    Multiple " Vitamins-Minerals (PRESERVISION AREDS PO) Take  by mouth every day.      Cholecalciferol (VITAMIN D HIGH POTENCY PO) Take 2,000 mg by mouth every day.      Polyethylene Glycol 400 (BLINK TEARS OP) by Ophthalmic route every day.      CALCIUM PO Take 1 Tablet by mouth every morning. Taking 2000mg  Indications: per pt is taking 500 mg      Polyethyl Glycol-Propyl Glycol (SYSTANE OP) Place 1 Drop in both eyes every bedtime. Indications: **OTC**       No current facility-administered medications for this visit.         Physical Exam:   Gen:           Alert and oriented, No apparent distress. Mood and affect appropriate, normal interaction with examiner.  Eyes:          PERRL, EOM intact, sclere white, conjunctive moist.  Ears:          Not examined.   Hearing:     Grossly intact.  Nose:          Normal, no lesions or deformities.  Dentition:    Not examined.   Oropharynx:   Not examined.   Mallampati Classification: Not examined.   Neck:        Supple, trachea midline, no masses.  Respiratory Effort: No intercostal retractions or use of accessory muscles.   Lung Auscultation:      Clear to auscultation bilaterally; no rales, rhonchi or wheezing.  CV:            Regular rate and rhythm. No murmurs, rubs or gallops.  Abd:           Not examined.   Lymphadenopathy: Not examined.   Gait and Station: Normal.  Digits and Nails: No clubbing, cyanosis, petechiae, or nodes.   Cranial Nerves: II-XII grossly intact.  Skin:        No rashes, lesions or ulcers noted.               Ext:           No cyanosis or edema.      Assessment:  1. Carcinoid tumor of lung, unspecified whether malignant        2. Multiple pulmonary nodules        3. BMI 30.0-30.9,adult  HEIGHT AND WEIGHT      4. Nonsmoker                 Immunizations:    Flu:recommend  Pneumovax 23:2015  Prevnar 13:2016  PCV 20: not due  COVID-19: 2021    Plan:  Patient has a very slowly growing right upper lobe nodule that is presumed carcinoid in nature.  Recommend continued  surveillance with repeat imaging due February 2026.  Reviewed in detail with patient.  Recurrent cough that seems to be allergy related and recommend use of Flonase 1 squirt each nostril twice daily for the next 7 days to see if this improves.  If worsening of symptoms she may contact me via MyChart or go to primary care/urgent care.  Will continue to follow multiple pulmonary nodules with surveillance imaging in 1 year.  Encourage healthy diet and activity for conditioning.  Follow-up February or March 2026 after CT imaging for review, sooner if needed.    Please note that this dictation was created using voice recognition software. I have made every reasonable attempt to correct obvious errors, but it is possible there are errors of grammar and possibly content that I did not discover before finalizing the note.

## 2025-04-17 ENCOUNTER — TELEPHONE (OUTPATIENT)
Dept: HEALTH INFORMATION MANAGEMENT | Facility: OTHER | Age: 75
End: 2025-04-17
Payer: MEDICARE

## 2025-05-12 ENCOUNTER — OFFICE VISIT (OUTPATIENT)
Dept: MEDICAL GROUP | Facility: PHYSICIAN GROUP | Age: 75
End: 2025-05-12
Payer: MEDICARE

## 2025-05-12 ENCOUNTER — RESULTS FOLLOW-UP (OUTPATIENT)
Dept: MEDICAL GROUP | Facility: PHYSICIAN GROUP | Age: 75
End: 2025-05-12

## 2025-05-12 VITALS
HEIGHT: 63 IN | RESPIRATION RATE: 14 BRPM | TEMPERATURE: 97.5 F | WEIGHT: 177.1 LBS | HEART RATE: 66 BPM | SYSTOLIC BLOOD PRESSURE: 114 MMHG | OXYGEN SATURATION: 98 % | DIASTOLIC BLOOD PRESSURE: 56 MMHG | BODY MASS INDEX: 31.38 KG/M2

## 2025-05-12 DIAGNOSIS — E78.00 PURE HYPERCHOLESTEROLEMIA: ICD-10-CM

## 2025-05-12 DIAGNOSIS — I48.0 PAROXYSMAL ATRIAL FIBRILLATION (HCC): ICD-10-CM

## 2025-05-12 DIAGNOSIS — I48.0 HYPERCOAGULABLE STATE DUE TO PAROXYSMAL ATRIAL FIBRILLATION (HCC): ICD-10-CM

## 2025-05-12 DIAGNOSIS — K43.9 HERNIA OF ABDOMINAL WALL: ICD-10-CM

## 2025-05-12 DIAGNOSIS — Z53.20 COLON CANCER SCREENING DECLINED: ICD-10-CM

## 2025-05-12 DIAGNOSIS — Z12.31 ENCOUNTER FOR SCREENING MAMMOGRAM FOR BREAST CANCER: ICD-10-CM

## 2025-05-12 DIAGNOSIS — M85.852 OSTEOPENIA OF LEFT HIP: ICD-10-CM

## 2025-05-12 DIAGNOSIS — D3A.8 NEUROENDOCRINE TUMOR: ICD-10-CM

## 2025-05-12 DIAGNOSIS — D68.69 HYPERCOAGULABLE STATE DUE TO PAROXYSMAL ATRIAL FIBRILLATION (HCC): ICD-10-CM

## 2025-05-12 DIAGNOSIS — I10 ESSENTIAL (PRIMARY) HYPERTENSION: ICD-10-CM

## 2025-05-12 DIAGNOSIS — E11.9 TYPE 2 DIABETES MELLITUS WITHOUT COMPLICATION, WITHOUT LONG-TERM CURRENT USE OF INSULIN (HCC): ICD-10-CM

## 2025-05-12 LAB
HBA1C MFR BLD: 6.2 % (ref ?–5.8)
POCT INT CON NEG: NEGATIVE
POCT INT CON POS: POSITIVE

## 2025-05-12 PROCEDURE — 3074F SYST BP LT 130 MM HG: CPT | Performed by: INTERNAL MEDICINE

## 2025-05-12 PROCEDURE — 3078F DIAST BP <80 MM HG: CPT | Performed by: INTERNAL MEDICINE

## 2025-05-12 PROCEDURE — G2211 COMPLEX E/M VISIT ADD ON: HCPCS | Performed by: INTERNAL MEDICINE

## 2025-05-12 PROCEDURE — 83036 HEMOGLOBIN GLYCOSYLATED A1C: CPT | Performed by: INTERNAL MEDICINE

## 2025-05-12 PROCEDURE — 99214 OFFICE O/P EST MOD 30 MIN: CPT | Performed by: INTERNAL MEDICINE

## 2025-05-12 ASSESSMENT — FIBROSIS 4 INDEX: FIB4 SCORE: 1.414213562373095049

## 2025-05-12 ASSESSMENT — PATIENT HEALTH QUESTIONNAIRE - PHQ9: CLINICAL INTERPRETATION OF PHQ2 SCORE: 0

## 2025-05-12 NOTE — PATIENT INSTRUCTIONS
Call [phone] to go ahead and schedule your CT chest, this needs to be done before March 10th 2026.    You can schedule your comprehensive health assessment with our  staff if you would be interested.

## 2025-05-12 NOTE — PROGRESS NOTES
Subjective:   Chief Complaint/History of Present Illness:  Cristela Alan is a 74 y.o. female established patient who presents today to discuss medical problems as listed below. Cristela is unaccompanied for today's visit.    History of Present Illness  The patient presents for evaluation of pulmonary carcinoids, diabetes, and hernia.    She has been diagnosed with multiple pulmonary nodules suspected to be carcinoid. During her last visit to the pulmonologist in 04/2025, it was decided to adopt a wait-and-see approach, with a follow-up CT scan planned for 03/2026. She expressed concerns about the potential need for oxygen therapy, as her  had required it, but was reassured that this was unlikely.    She is amenable to undergoing another blood test. She has not had an eye appointment in the past year but plans to schedule one after the second week of 05/2025. She has experienced occasional flashes in her vision, approximately 3 or 4 times, always on the same side and towards the top. She has an upcoming mammogram appointment and has already scheduled an appointment with Dr. Wylie's office. She does not recall when her last mammogram was conducted. She reports no changes in her hearing. She had a dental appointment in 03/2025. She reports no issues with her teeth, mouth, tongue, or swallowing. She has a history of posterior vitreous detachment in her left eye, with a small amount of atrophy around the optic disc. She has a history of mild drusen findings, with no thickening or swelling.    She has an ubilical hernia, which has remained stable in size. She has been advised that surgical intervention is not necessary unless she experiences hardening of the hernia. She has attempted to manually reduce the hernia, but this resulted in gas and severe pain lasting several hours.    She has been experiencing some discomfort in her left hip while doing yard work, but this seems to have resolved.     Current  "Medications:  Current Outpatient Medications Ordered in Epic   Medication Sig Dispense Refill    metoprolol tartrate (LOPRESSOR) 50 MG Tab Take 1 Tablet by mouth 2 times a day. 200 Tablet 3    lisinopril (PRINIVIL) 5 MG Tab TAKE 1 TABLET BY MOUTH EVERY  Tablet 3    atorvastatin (LIPITOR) 40 MG Tab TAKE 1 TABLET BY MOUTH EVERY DAY IN THE EVENING 100 Tablet 3    dabigatran (PRADAXA) 150 MG Cap capsule Take 1 Capsule by mouth 2 times a day. 200 Capsule 3    FreeStyle Lancets Misc USE ONCE DAILY AND AS NEEDED FOR SYMTOMS OF HIGH OR LOW SUGAR 100 Each 3    glucose blood (FREESTYLE LITE) strip USE 1 STRIP TO TEST BLOOD SUGARS EVERY  Strip 3    Lancets Use once daily as needed for symptoms of high or low sugar 100 Each 3    Multiple Vitamins-Minerals (PRESERVISION AREDS PO) Take  by mouth every day.      Cholecalciferol (VITAMIN D HIGH POTENCY PO) Take 2,000 mg by mouth every day.      Polyethylene Glycol 400 (BLINK TEARS OP) by Ophthalmic route every day.      CALCIUM PO Take 1 Tablet by mouth every morning. Taking 2000mg  Indications: per pt is taking 500 mg      Polyethyl Glycol-Propyl Glycol (SYSTANE OP) Place 1 Drop in both eyes every bedtime. Indications: **OTC**       No current Epic-ordered facility-administered medications on file.          Objective:   Physical Exam:    Vitals: /56 (BP Location: Left arm, Patient Position: Sitting, BP Cuff Size: Adult)   Pulse 66   Temp 36.4 °C (97.5 °F)   Resp 14   Ht 1.6 m (5' 3\")   Wt 80.3 kg (177 lb 1.6 oz)   SpO2 98%    BMI: Body mass index is 31.37 kg/m².  Physical Exam  Constitutional:       General: She is not in acute distress.     Appearance: Normal appearance. She is not ill-appearing.   HENT:      Right Ear: Tympanic membrane, ear canal and external ear normal. There is no impacted cerumen.      Left Ear: Tympanic membrane, ear canal and external ear normal. There is no impacted cerumen.   Eyes:      General: No scleral icterus.     " Conjunctiva/sclera: Conjunctivae normal.   Cardiovascular:      Rate and Rhythm: Normal rate and regular rhythm.      Pulses: Normal pulses.   Pulmonary:      Effort: Pulmonary effort is normal. No respiratory distress.      Breath sounds: No wheezing or rhonchi.   Abdominal:      General: Bowel sounds are normal. There is no distension.      Palpations: Abdomen is soft.      Tenderness: There is no abdominal tenderness.      Hernia: A hernia is present.      Comments: Moderately sized umbilical hernia, reducible.    Musculoskeletal:      Right lower leg: No edema.      Left lower leg: No edema.   Skin:     General: Skin is warm and dry.      Findings: No rash.   Neurological:      Gait: Gait normal.   Psychiatric:         Mood and Affect: Mood normal.         Behavior: Behavior normal.         Thought Content: Thought content normal.         Judgment: Judgment normal.           Results  Imaging   - Bone density test of the left hip: 08/17/2023, Mild osteopenia in the left hip    Assessment & Plan  1. Pulmonary carcinoids.  - The patient has multiple nodules and carcinoids, which are being monitored by the pulmonary medicine group.  - A follow-up CT scan is scheduled for March 10, 2026.  - The patient was informed that these carcinoids are slow-growing and may not require intervention unless they cause symptoms such as gastrointestinal issues.  - The patient was advised to schedule the CT scan before March 10, 2026, to avoid expiration of the order.    2. Diabetes Mellitus type 2 without complication.  - An A1c test will be conducted today to assess the patient's 90-day average blood sugar levels.  - The patient was advised to avoid using mouthwash before future blood tests to prevent inaccurate readings due to sublingual absorption.  - The effectiveness of current diabetes management will be evaluated based on the A1c results.  - The patient will be informed of the A1c results and any necessary adjustments to the  treatment plan will be made.    3. Osteopenia  4. Breast cancer screening  5. Colon cancer screening declined   - The patient was advised to schedule a comprehensive health assessment, which includes an eye exam, to be completed by the end of the year.  - A mammogram was ordered for July 2, 2025.  - A bone density test was ordered for August 17, 2025, to follow up on mild osteopenia in the left hip.  - The patient was also advised to consider a Cologuard test for colon cancer screening, as she is due for a 10-year colonoscopy but prefers not to undergo the procedure. The patient was instructed to report any instances of dark black stools or bright red bleeding.    6. Umbilical/abdominal wall hernia  - The patient has a hernia that is not currently causing significant issues.  - She was advised to avoid heavy lifting and to lie flat if she experiences abrupt hardening or discomfort to relax the surrounding muscles.  - The hernia will continue to be monitored for any changes in size or symptoms.  - Surgical intervention will be considered only if the hernia becomes problematic.    7. Hypercholesterolemia  8. Hypertension  - Chronic and stable issues. Plan to update lab work before our follow up visit in 6 months. Continue atorvastatin 40 mg daily.  - Continue lisinopril 5 mg daily and metoprolol tartrate 50 mg twice daily.     9. Paroxysmal atrial fibrillation  10. Secondary hypercoagulable state due to Afib.  - Chronic and stable, she is in normal sinus rhythm at this time, less palpitations since transitioning onto higher dose metoprolol tartrate at 50 mg twice daily for rate control.  - Continue Pradaxa 150 mg twice daily for stroke prophylaxis, update GFR before follow up visit in 6 months.    Follow-up  The patient will follow up in 6 months.      Assessment and Plan:   Cristela is a 74 y.o. female with the following:  Problem List Items Addressed This Visit       Essential (primary) hypertension    Hernia of  abdominal wall    Hypercoagulable state due to paroxysmal atrial fibrillation (HCC)    Hyperlipidemia    Relevant Orders    FREE THYROXINE    TSH    VITAMIN B12    VITAMIN D,25 HYDROXY (DEFICIENCY)    Lipid Profile    Neuroendocrine tumor    Osteopenia of left hip with elevated FRAX    Relevant Orders    DS-BONE DENSITY STUDY (DEXA)    Paroxysmal atrial fibrillation (HCC)    Type 2 diabetes mellitus without complication, without long-term current use of insulin (HCC)    Relevant Orders    POCT  A1C    HEMOGLOBIN A1C    MICROALBUMIN CREAT RATIO URINE    Comp Metabolic Panel    CBC WITH DIFFERENTIAL     Other Visit Diagnoses         Encounter for screening mammogram for breast cancer        Relevant Orders    MA-SCREENING MAMMO BILAT W/TOMOSYNTHESIS W/CAD      Colon cancer screening declined                   RTC: Return in about 6 months (around 11/12/2025).    I spent a total of 31 minutes with record review, exam, communication with the patient, communication with other providers, and documentation of this encounter.    Verbal consent was acquired by the patient to use [employer] ambient listening note generation during this visit Yes     Billing : secondary to the complexity of this patient's illnesses and their interactions.  All problems listed were discussed during the office visit, medications were evaluated and complexities were discussed as well as plan for the future      PLEASE NOTE: This dictation was created using voice recognition software. I have made every reasonable attempt to correct obvious errors, but I expect that there are errors of grammar and possibly content that I did not discover before finalizing the note.      Temitope Bess, DO  Geriatric and Internal Medicine  [facility]

## 2025-05-20 ASSESSMENT — PATIENT HEALTH QUESTIONNAIRE - PHQ9
2. FEELING DOWN, DEPRESSED, IRRITABLE, OR HOPELESS: NOT AT ALL
1. LITTLE INTEREST OR PLEASURE IN DOING THINGS: NOT AT ALL

## 2025-05-20 ASSESSMENT — ENCOUNTER SYMPTOMS: GENERAL WELL-BEING: GOOD

## 2025-05-20 ASSESSMENT — ACTIVITIES OF DAILY LIVING (ADL): BATHING_REQUIRES_ASSISTANCE: 0

## 2025-05-22 NOTE — ASSESSMENT & PLAN NOTE
Chronic, stable. She maintains on pradaxa 150 mg BID and metoprolol 50 mg BID. Denies frequent palpitations or dizziness. Follow up with cardiology.

## 2025-05-22 NOTE — ASSESSMENT & PLAN NOTE
Chronic, Stable. BP in office today is 120/70. She does not check her BP at home. She currently takes lisinopril 5 mg daily & metoprolol 50 mg BID. Follow up with PCP for continued monitoring and management.

## 2025-05-22 NOTE — ASSESSMENT & PLAN NOTE
Chronic, stable. She currently takes atorvastatin 40 mg daily. We discussed her dietary/lifestyle regimen. Follow up with PCP for continued monitoring and management.  Lab Results   Component Value Date/Time    CHOLSTRLTOT 133 01/30/2025 08:11 AM    TRIGLYCERIDE 80 01/30/2025 08:11 AM    HDL 62 01/30/2025 08:11 AM    LDL 55 01/30/2025 08:11 AM

## 2025-05-22 NOTE — ASSESSMENT & PLAN NOTE
Chronic, stable. 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 She does follow with Pulmonology who felt a watch & wait approach was the best step going forward. Has follow up scans scheduled for next year. Follow up with Pulmonology as per routine.

## 2025-05-22 NOTE — ASSESSMENT & PLAN NOTE
Chronic, stable. Last A1c in May 2025 was 6.2. She does not check her blood sugar at home. Currently does not manage with medications; being managed with diet and exercise habits. Last retinal screening was last year- she declines eye exam in office, as she has an appointment scheduled in the upcoming weeks. Last microalbumin creat ratio in Jan 2025 was WNL. Her PCP does her feet exams. She is on a statin. We discussed diet and lifestyle habits. Follow up with PCP for continued monitoring and management.

## 2025-05-23 ENCOUNTER — OFFICE VISIT (OUTPATIENT)
Dept: FAMILY PLANNING/WOMEN'S HEALTH CLINIC | Facility: PHYSICIAN GROUP | Age: 75
End: 2025-05-23
Payer: MEDICARE

## 2025-05-23 VITALS
SYSTOLIC BLOOD PRESSURE: 120 MMHG | WEIGHT: 176 LBS | HEIGHT: 63 IN | DIASTOLIC BLOOD PRESSURE: 70 MMHG | OXYGEN SATURATION: 95 % | BODY MASS INDEX: 31.18 KG/M2 | HEART RATE: 62 BPM

## 2025-05-23 DIAGNOSIS — D3A.8 NEUROENDOCRINE TUMOR: ICD-10-CM

## 2025-05-23 DIAGNOSIS — E11.9 TYPE 2 DIABETES MELLITUS WITHOUT COMPLICATION, WITHOUT LONG-TERM CURRENT USE OF INSULIN (HCC): ICD-10-CM

## 2025-05-23 DIAGNOSIS — I48.0 PAROXYSMAL ATRIAL FIBRILLATION (HCC): ICD-10-CM

## 2025-05-23 DIAGNOSIS — E78.00 PURE HYPERCHOLESTEROLEMIA: ICD-10-CM

## 2025-05-23 DIAGNOSIS — I10 ESSENTIAL (PRIMARY) HYPERTENSION: ICD-10-CM

## 2025-05-23 DIAGNOSIS — I47.19 ATRIAL TACHYCARDIA (HCC): Primary | ICD-10-CM

## 2025-05-23 DIAGNOSIS — I47.10 PAROXYSMAL SVT (SUPRAVENTRICULAR TACHYCARDIA) (HCC): ICD-10-CM

## 2025-05-23 SDOH — ECONOMIC STABILITY: FOOD INSECURITY: HOW HARD IS IT FOR YOU TO PAY FOR THE VERY BASICS LIKE FOOD, HOUSING, MEDICAL CARE, AND HEATING?: NOT HARD AT ALL

## 2025-05-23 SDOH — ECONOMIC STABILITY: FOOD INSECURITY: WITHIN THE PAST 12 MONTHS, YOU WORRIED THAT YOUR FOOD WOULD RUN OUT BEFORE YOU GOT THE MONEY TO BUY MORE.: NEVER TRUE

## 2025-05-23 SDOH — ECONOMIC STABILITY: HOUSING INSECURITY: AT ANY TIME IN THE PAST 12 MONTHS, WERE YOU HOMELESS OR LIVING IN A SHELTER (INCLUDING NOW)?: NO

## 2025-05-23 SDOH — ECONOMIC STABILITY: HOUSING INSECURITY: IN THE LAST 12 MONTHS, WAS THERE A TIME WHEN YOU WERE NOT ABLE TO PAY THE MORTGAGE OR RENT ON TIME?: NO

## 2025-05-23 SDOH — ECONOMIC STABILITY: HOUSING INSECURITY: IN THE PAST 12 MONTHS, HOW MANY TIMES HAVE YOU MOVED WHERE YOU WERE LIVING?: 1

## 2025-05-23 SDOH — ECONOMIC STABILITY: FOOD INSECURITY: WITHIN THE PAST 12 MONTHS, THE FOOD YOU BOUGHT JUST DIDN'T LAST AND YOU DIDN'T HAVE MONEY TO GET MORE.: NEVER TRUE

## 2025-05-23 SDOH — ECONOMIC STABILITY: TRANSPORTATION INSECURITY: IN THE PAST 12 MONTHS, HAS LACK OF TRANSPORTATION KEPT YOU FROM MEDICAL APPOINTMENTS OR FROM GETTING MEDICATIONS?: NO

## 2025-05-23 ASSESSMENT — ACTIVITIES OF DAILY LIVING (ADL): LACK_OF_TRANSPORTATION: NO

## 2025-05-23 ASSESSMENT — PATIENT HEALTH QUESTIONNAIRE - PHQ9: CLINICAL INTERPRETATION OF PHQ2 SCORE: 0

## 2025-05-23 ASSESSMENT — PAIN SCALES - GENERAL: PAINLEVEL_OUTOF10: NO PAIN

## 2025-05-23 ASSESSMENT — FIBROSIS 4 INDEX: FIB4 SCORE: 1.414213562373095049

## 2025-05-23 NOTE — PROGRESS NOTES
Comprehensive Health Assessment Program     Cristela Alan is a 74 y.o. here for her comprehensive health assessment.    Patient Active Problem List    Diagnosis Date Noted    Atrial tachycardia (HCC) 08/23/2024    Paroxysmal SVT (supraventricular tachycardia) (HCC) 07/08/2024    Neuroendocrine tumor 05/07/2024    Chronic anticoagulation 04/29/2024    Palpitations 04/29/2024    Bereavement 07/06/2023    POLST- DNR/DNI 01/05/2023    Osteopenia of left hip with elevated FRAX 03/23/2020    Diastolic dysfunction 03/23/2020    Type 2 diabetes mellitus without complication, without long-term current use of insulin (HCC) 03/23/2020    Obesity (BMI 30-39.9) 03/23/2020    Carotid artery stenosis 12/14/2018    Lung nodules, enlarging 11/09/2016    Hernia of abdominal wall 05/10/2016    Family history of cancer 05/10/2016    Vitamin D deficiency 10/28/2015    Early dry stage nonexudative age-related macular degeneration of both eyes 10/28/2015    History of endometrial cancer 08/28/2014    Allergic rhinitis 05/13/2014    Hyperlipidemia 05/13/2014    Paroxysmal atrial fibrillation (HCC) 12/06/2012    Hypercoagulable state due to paroxysmal atrial fibrillation (HCC) 12/06/2012    Essential (primary) hypertension 12/06/2012    Transient ischemic attack 12/06/2012       Current Medications[1]       Current supplements as per medication list.     Allergies:   Fentanyl; Flu virus vaccine; Versed; Bee; Codeine; Eggs; Lidocaine hcl; Other drug; Penicillins; Soap; Tape; and Xylocaine [lidocaine hcl, local anesth.]  Social History[2]  Family History   Problem Relation Age of Onset    Hyperlipidemia Mother     Hypertension Mother     Cancer Mother     No Known Problems Father     Heart Disease Sister     Alcohol/Drug Sister     Hypertension Sister     Heart Attack Sister     Cancer Sister 66        ? breast cancer as well    Hypertension Sister     Hyperlipidemia Sister     Cancer Other     Hypertension Other     Diabetes Maternal  Grandmother         elderly    Other Sister         suicide     Stroke Neg Suyapa Archibald  has a past medical history of Allergic rhinitis, Anesthesia, Atrial fibrillation (HCC), Cancer (HCC) (2014), CATARACT, Cholesterol blood decreased, Cough, Dental disorder, Dermatitis, contact, Dyslipidemia (06/30/2016), Elevated alkaline phosphatase level (09/20/2021), Encounter for general adult medical examination without abnormal findings (12/02/2014), Endometrium cancer (HCC), Hyperlipidemia, Hypertension, Impaired glucose tolerance, Macular degeneration, Obesity, Pneumonia, Prolapsed uterus (08/2014), Pulmonary nodule (11/09/2016), Shortness of breath, Stroke (HCC), TIA (transient ischemic attack), Unspecified hemorrhagic conditions, Vitamin D deficiency, and Wheezing.   Past Surgical History[3]    Screening:  In the last six months have you experienced any leakage of urine? No    Depression Screening  Little interest or pleasure in doing things?  0 - not at all  Feeling down, depressed , or hopeless? 0 - not at all  Trouble falling or staying asleep, or sleeping too much?     Feeling tired or having little energy?     Poor appetite or overeating?     Feeling bad about yourself - or that you are a failure or have let yourself or your family down?    Trouble concentrating on things, such as reading the newspaper or watching television?    Moving or speaking so slowly that other people could have noticed.  Or the opposite - being so fidgety or restless that you have been moving around a lot more than usual?     Thoughts that you would be better off dead, or of hurting yourself?     Patient Health Questionnaire Score:      If depressive symptoms identified deferred to follow up visit unless specifically addressed in assessment and plan.    Interpretation of PHQ-9 Total Score   Score Severity   1-4 No Depression   5-9 Mild Depression   10-14 Moderate Depression   15-19 Moderately Severe Depression   20-27 Severe  Depression    Screening for Cognitive Impairment  Do you or any of your friends or family members have any concern about your memory? No  Three Minute Recall (Village, Kitchen, Baby) 3/3    David clock face with all 12 numbers and set the hands to show 10 minutes past 11.  Yes    Cognitive concerns identified deferred for follow up unless specifically addressed in assessment and plan.    Fall Risk Assessment  Has the patient had two or more falls in the last year or any fall with injury in the last year?  No    Safety Assessment  Do you always wear your seatbelt?  Yes  Any changes to home needed to function safely? No She has hand rails on her stairs.   Difficulty hearing.  No  Patient counseled about all safety risks that were identified.    Functional Assessment ADLs  Are there any barriers preventing you from cooking for yourself or meeting nutritional needs?  No.    Are there any barriers preventing you from driving safely or obtaining transportation?  No.    Are there any barriers preventing you from using a telephone or calling for help?  No    Are there any barriers preventing you from shopping?  No.    Are there any barriers preventing you from taking care of your own finances?  No    Are there any barriers preventing you from managing your medications?  No    Are there any barriers preventing you from showering, bathing or dressing yourself? No    Are there any barriers preventing you from doing housework or laundry? No    Are there any barriers preventing you from using the toilet?No    Are you currently engaging in any exercise or physical activity?  Yes.  She does her own yard work    Self-Assessment of Health  What is your perception of your health? Good    Do you sleep more than six hours a night? Yes    In the past 7 days, how much did pain keep you from doing your normal work? Some    Do you spend quality time with family or friends (virtually or in person)? Yes    Do you usually eat a heart healthy  diet that constists of a variety of fruits, vegetables, whole grains and fiber? Yes    Do you eat foods high in fat and/or Fast Food more than three times per week? No    How concerned are you that your medical conditions are not being well managed? Not at all    Are you worried that in the next 2 months, you may not have stable housing that you own, rent, or stay in as part of a household? No        Advance Care Planning  Do you have an Advance Directive, Living Will, Durable Power of , or POLST? Yes  Advance Directive Living Will     is not on file - instructed patient to bring in a copy to scan into their chart      Health Maintenance Summary            Current Care Gaps       Diabetes: Retinopathy Screening (Yearly) Overdue since 2/22/2025 02/22/2024  RETINAL SCREENING RESULTS    11/06/2023  POCT Retinal Eye Exam    01/19/2023  RETINAL SCREENING RESULTS    12/01/2022  RETINAL SCREENING RESULTS    12/01/2022  RETINAL SCREENING RESULTS     Only the first 5 history entries have been loaded, but more history exists.                    Upcoming       Zoster (Shingles) Vaccines (1 of 2) Postponed until 10/12/2025      No completion history exists for this topic.              COVID-19 Vaccine (3 - 2024-25 season) Postponed until 5/12/2026 04/16/2021  Imm Admin: PFIZER PURPLE CAP SARS-COV-2 VACCINATION (12+)    03/24/2021  Imm Admin: PFIZER PURPLE CAP SARS-COV-2 VACCINATION (12+)              Mammogram (Yearly) Next due on 7/2/2025 05/12/2025  Order placed for MA-SCREENING MAMMO BILAT W/TOMOSYNTHESIS W/CAD by Temitope Bess D.O.    07/02/2024  MA-SCREENING MAMMO BILAT W/TOMOSYNTHESIS W/CAD    06/06/2023  MA-SCREENING MAMMO BILAT W/TOMOSYNTHESIS W/CAD    01/13/2022  MA-SCREENING MAMMO BILAT W/TOMOSYNTHESIS W/CAD    11/21/2020  MA-SCREENING MAMMO BILAT W/TOMOSYNTHESIS W/CAD     Only the first 5 history entries have been loaded, but more history exists.            Diabetes: Monofilament / LE  Exam (Yearly) Next due on 7/8/2025 07/08/2024  [employer]: WORKFLOW - DIABETES - DIABETIC FOOT EXAM PERFORMED    07/08/2024  Diabetic Monofilament Lower Extremity Exam    07/06/2023  Diabetic Monofilament Lower Extremity Exam    03/21/2022  Diabetic Monofilament Lower Extremity Exam    03/22/2021  Diabetic Monofilament Lower Extremity Exam      Only the first 5 history entries have been loaded, but more history exists.              Bone Density Scan (Every 2 Years) Next due on 8/17/2025 05/12/2025  Order placed for DS-BONE DENSITY STUDY (DEXA) by Temitope Bess D.O.    08/17/2023  DS-BONE DENSITY STUDY (DEXA)    06/29/2020  DS-BONE DENSITY STUDY (DEXA)    05/26/2016  DS-BONE DENSITY STUDY (DEXA)    12/04/2009  DS-BONE DENSITY STUDY (DEXA)      Only the first 5 history entries have been loaded, but more history exists.              A1c Screening (Every 6 Months) Next due on 11/12/2025 05/12/2025  POCT  A1C    01/30/2025  HEMOGLOBIN A1C    05/13/2024  HEMOGLOBIN A1C    09/25/2023  HEMOGLOBIN A1C    03/27/2023  HEMOGLOBIN A1C      Only the first 5 history entries have been loaded, but more history exists.              Fasting Lipid Profile (Yearly) Next due on 1/30/2026 05/12/2025  Order placed for Lipid Profile by Temitope Bess D.O.    01/30/2025  Lipid Profile    05/13/2024  Lipid Profile    09/25/2023  Lipid Profile    03/27/2023  Lipid Profile      Only the first 5 history entries have been loaded, but more history exists.              Diabetes: Urine Protein Screening (Yearly) Next due on 1/30/2026 05/12/2025  Order placed for MICROALBUMIN CREAT RATIO URINE by Temitope Bess D.O.    01/30/2025  MICROALBUMIN CREAT RATIO URINE    05/13/2024  MICROALBUMIN CREAT RATIO URINE    09/25/2023  MICROALBUMIN CREAT RATIO URINE    09/09/2022  MICROALBUMIN CREAT RATIO URINE      Only the first 5 history entries have been loaded, but more history exists.              SERUM CREATININE  (Yearly) Next due on 1/30/2026 05/12/2025  Order placed for Comp Metabolic Panel by Temitope Bess D.O.    01/30/2025  Comp Metabolic Panel    01/30/2025  Comp Metabolic Panel    05/13/2024  Comp Metabolic Panel    09/25/2023  Comp Metabolic Panel      Only the first 5 history entries have been loaded, but more history exists.              Annual Wellness Visit (Yearly) Next due on 5/23/2026 05/23/2025  Level of Service: NM ANNUAL WELLNESS VISIT-INCLUDES PPPS SUBSEQUE*    11/12/2024  Level of Service: NM ANNUAL WELLNESS VISIT-INCLUDES PPPS SUBSEQUE*    07/06/2023  Level of Service: NM ANNUAL WELLNESS VISIT-INCLUDES PPPS SUBSEQUE*    10/29/2018  Level of Service: PB PBEVENTIVE VISIT,EST,65 & OVER    10/29/2018  Visit Dx: Encounter for Medicare annual wellness exam      Only the first 5 history entries have been loaded, but more history exists.              IMM DTaP/Tdap/Td Vaccine (2 - Td or Tdap) Next due on 7/28/2027 07/28/2017  Imm Admin: Tdap Vaccine                      Completed or No Longer Recommended       Hepatitis C Screening  Completed      06/12/2017  Hepatitis C Antibody component of HEP C VIRUS ANTIBODY    02/23/2013  Hepatitis C Antibody component of HEP C VIRUS ANTIBODY              Pneumococcal Vaccine: 50+ Years (Series Information) Completed      11/09/2016  Imm Admin: Pneumococcal Conjugate Vaccine (Prevnar/PCV-13)    05/19/2015  Imm Admin: Pneumococcal polysaccharide vaccine (PPSV-23)              Hepatitis A Vaccine (Hep A) (Series Information) Aged Out      No completion history exists for this topic.              HPV Vaccines (Series Information) Aged Out     No completion history exists for this topic.              Polio Vaccine (Inactivated Polio) (Series Information) Aged Out     No completion history exists for this topic.              Meningococcal Immunization (Series Information) Aged Out     No completion history exists for this topic.              Meningococcal B  "Vaccine (Series Information) Aged Out     No completion history exists for this topic.              Cervical Cancer Screening  Discontinued        Frequency changed to Never automatically (Topic No Longer Applies)    09/13/2022  PAP ONLY    09/14/2021  PAP ONLY    01/07/2019  PATHOLOGY GYN SPECIMEN    01/07/2019  THINPREP PAP WITH HPV     Only the first 5 history entries have been loaded, but more history exists.            Colorectal Cancer Screening  Discontinued        Frequency changed to Never automatically (Topic No Longer Applies)    03/04/2015  REFERRAL TO GI FOR COLONOSCOPY    03/04/2015  Colonoscopy (Done)              Hepatitis B Vaccine (Hep B)  Discontinued      No completion history exists for this topic.              Influenza Vaccine  Discontinued     No completion history exists for this topic.                            Patient Care Team:  Temitope Bess D.O. as PCP - General (Internal Medicine)  Temitope Bess D.O. as PCP - [facility] Paneled  Demetris Avery M.D. as Consulting Physician (Cardiovascular Disease (Cardiology))  Pantera Wylie M.D. as Consulting Physician (Gynecologic Oncology)  Nico De Jesus M.D. as Consulting Physician (Ophthalmology)  Marshall Coats Ass't as [facility]     Financial Resource Strain: Low Risk  (5/23/2025)    Overall Financial Resource Strain (CARDIA)     Difficulty of Paying Living Expenses: Not hard at all      Transportation Needs: No Transportation Needs (5/23/2025)    PRAPARE - Transportation     Lack of Transportation (Medical): No     Lack of Transportation (Non-Medical): No      Food Insecurity: No Food Insecurity (5/23/2025)    Hunger Vital Sign     Worried About Running Out of Food in the Last Year: Never true     Ran Out of Food in the Last Year: Never true        Encounter Vitals  Blood Pressure : 120/70  Pulse: 62  Pulse Oximetry: 95 %  Weight: 79.8 kg (176 lb)  Height: 160 cm (5' 3\")  BMI (Calculated): 31.18  Pain Score: " No pain     Physical Exam  Constitutional:       General: She is not in acute distress.     Appearance: Normal appearance.   HENT:      Head: Normocephalic and atraumatic.   Eyes:      Extraocular Movements: Extraocular movements intact.      Pupils: Pupils are equal, round, and reactive to light.   Cardiovascular:      Rate and Rhythm: Normal rate and regular rhythm.      Heart sounds: Normal heart sounds.   Pulmonary:      Breath sounds: Normal breath sounds.   Musculoskeletal:      Right lower leg: No edema.      Left lower leg: No edema.   Neurological:      Mental Status: She is alert and oriented to person, place, and time.   Psychiatric:         Mood and Affect: Mood normal.         Behavior: Behavior normal.       Assessment and Plan. The following treatment and monitoring plan is recommended:  Atrial tachycardia (HCC)  Paroxysmal atrial fibrillation (HCC)  Paroxysmal SVT (supraventricular tachycardia) (HCC)  Chronic, stable. She maintains on pradaxa 150 mg BID and metoprolol 50 mg BID. Denies frequent palpitations or dizziness. Follow up with cardiology.     Essential (primary) hypertension  Chronic, Stable. BP in office today is 120/70. She does not check her BP at home. She currently takes lisinopril 5 mg daily & metoprolol 50 mg BID. Follow up with PCP for continued monitoring and management.    Hyperlipidemia  Chronic, stable. She currently takes atorvastatin 40 mg daily. We discussed her dietary/lifestyle regimen. Follow up with PCP for continued monitoring and management.  Lab Results   Component Value Date/Time    CHOLSTRLTOT 133 01/30/2025 08:11 AM    TRIGLYCERIDE 80 01/30/2025 08:11 AM    HDL 62 01/30/2025 08:11 AM    LDL 55 01/30/2025 08:11 AM     Neuroendocrine tumor  Chronic, stable. 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 She does follow with Pulmonology who felt a watch & wait approach was the best step going forward. Has follow up scans scheduled  for next year. Follow up with Pulmonology as per routine.     Type 2 diabetes mellitus without complication, without long-term current use of insulin ([facility])  Chronic, stable. Last A1c in May 2025 was 6.2. She does not check her blood sugar at home. Currently does not manage with medications; being managed with diet and exercise habits. Last retinal screening was last year- she declines eye exam in office, as she has an appointment scheduled in the upcoming weeks. Last microalbumin creat ratio in Jan 2025 was WNL. Her PCP does her feet exams. She is on a statin. We discussed diet and lifestyle habits. Follow up with PCP for continued monitoring and management.        Services suggested: No services needed at this time  Health Care Screening: Age-appropriate preventive services recommended by USPTF and ACIP covered by Medicare were discussed today. Services ordered if indicated and agreed upon by the patient.  Referrals offered: Community-based lifestyle interventions to reduce health risks and promote self-management and wellness, fall prevention, nutrition, physical activity, tobacco-use cessation, weight loss, and mental health services as per orders if indicated.    Discussion today about general wellness and lifestyle habits:    Prevent falls and reduce trip hazards; Cautioned about securing or removing rugs.  Have a working fire alarm and carbon monoxide detector.  Engage in regular physical activity and social activities.    Follow-up: Return for appointment with Primary Care Provider as needed..              [1]   Current Outpatient Medications   Medication Sig Dispense Refill    metoprolol tartrate (LOPRESSOR) 50 MG Tab Take 1 Tablet by mouth 2 times a day. 200 Tablet 3    lisinopril (PRINIVIL) 5 MG Tab TAKE 1 TABLET BY MOUTH EVERY  Tablet 3    atorvastatin (LIPITOR) 40 MG Tab TAKE 1 TABLET BY MOUTH EVERY DAY IN THE EVENING 100 Tablet 3    dabigatran (PRADAXA) 150 MG Cap capsule Take 1 Capsule by mouth  "2 times a day. 200 Capsule 3    FreeStyle Lancets Misc USE ONCE DAILY AND AS NEEDED FOR SYMTOMS OF HIGH OR LOW SUGAR 100 Each 3    glucose blood (FREESTYLE LITE) strip USE 1 STRIP TO TEST BLOOD SUGARS EVERY  Strip 3    Lancets Use once daily as needed for symptoms of high or low sugar 100 Each 3    Multiple Vitamins-Minerals (PRESERVISION AREDS PO) Take  by mouth every day.      Cholecalciferol (VITAMIN D HIGH POTENCY PO) Take 2,000 mg by mouth every day.      Polyethylene Glycol 400 (BLINK TEARS OP) by Ophthalmic route every day.      CALCIUM PO Take 1 Tablet by mouth every morning. Taking 2000mg  Indications: per pt is taking 500 mg      Polyethyl Glycol-Propyl Glycol (SYSTANE OP) Place 1 Drop in both eyes every bedtime. Indications: **OTC**       No current facility-administered medications for this visit.   [2]   Social History  Tobacco Use    Smoking status: Never    Smokeless tobacco: Never    Tobacco comments:     continued abstinence   Vaping Use    Vaping status: Never Used   Substance Use Topics    Alcohol use: No    Drug use: No   [3]   Past Surgical History:  Procedure Laterality Date    HYSTERECTOMY ROBOTIC XI  8/28/2014    Performed by Pantera Wylie M.D. at SURGERY [facility] ORS    LYMPH NODE DISSECTION ROBOTIC XI  8/28/2014    Performed by Pantera Wylie M.D. at SURGERY [facility] ORS    CATARACT PHACO WITH IOL  8/19/2010    Performed by DAVE AGUILAR at SURGERY SAME DAY [facility] ORS    CATARACT PHACO WITH IOL  8/5/2010    Performed by DAVE AGUILAR at SURGERY SAME DAY [facility] ORS    HIP ARTHROSCOPY  5/12/08    Performed by ZINA HOLLIS at SURGERY [facility] ORS    ACETABULAR OSTEOTOMY  5/12/08    Performed by ZINA HOLLIS at [facility] ORS    ORTHOPEDIC OSTEOTOMY  5/12/08    Performed by ZINA HOLLIS at SURGERY [facility] ORS    NASAL POLYPECTOMY  1994    \"removal of papilloma\"    ABDOMINAL HYSTERECTOMY TOTAL      EYE SURGERY       "

## 2025-06-30 PROCEDURE — RXMED WILLOW AMBULATORY MEDICATION CHARGE: Performed by: INTERNAL MEDICINE

## 2025-07-03 ENCOUNTER — PHARMACY VISIT (OUTPATIENT)
Dept: PHARMACY | Facility: MEDICAL CENTER | Age: 75
End: 2025-07-03
Payer: COMMERCIAL

## 2025-08-18 ENCOUNTER — HOSPITAL ENCOUNTER (OUTPATIENT)
Dept: RADIOLOGY | Facility: MEDICAL CENTER | Age: 75
End: 2025-08-18
Attending: INTERNAL MEDICINE
Payer: MEDICARE

## 2025-08-18 VITALS — BODY MASS INDEX: 30.65 KG/M2 | WEIGHT: 173 LBS | HEIGHT: 63 IN

## 2025-08-18 DIAGNOSIS — Z12.31 ENCOUNTER FOR SCREENING MAMMOGRAM FOR BREAST CANCER: ICD-10-CM

## 2025-08-18 DIAGNOSIS — M85.852 OSTEOPENIA OF LEFT HIP: ICD-10-CM

## 2025-08-18 PROCEDURE — 77080 DXA BONE DENSITY AXIAL: CPT

## 2025-08-18 PROCEDURE — 77067 SCR MAMMO BI INCL CAD: CPT

## 2025-08-18 ASSESSMENT — FIBROSIS 4 INDEX: FIB4 SCORE: 1.414213562373095049
